# Patient Record
Sex: MALE | Race: ASIAN | NOT HISPANIC OR LATINO | Employment: FULL TIME | URBAN - METROPOLITAN AREA
[De-identification: names, ages, dates, MRNs, and addresses within clinical notes are randomized per-mention and may not be internally consistent; named-entity substitution may affect disease eponyms.]

---

## 2017-02-23 ENCOUNTER — MYC REFILL (OUTPATIENT)
Dept: GASTROENTEROLOGY | Facility: CLINIC | Age: 37
End: 2017-02-23

## 2017-02-23 DIAGNOSIS — K75.81 NASH (NONALCOHOLIC STEATOHEPATITIS): ICD-10-CM

## 2017-02-23 DIAGNOSIS — B18.1 VIRAL HEPATITIS B CHRONIC (H): ICD-10-CM

## 2017-02-24 RX ORDER — TENOFOVIR DISOPROXIL FUMARATE 300 MG/1
300 TABLET, FILM COATED ORAL DAILY
Qty: 30 TABLET | Refills: 0 | Status: SHIPPED | OUTPATIENT
Start: 2017-02-24 | End: 2017-02-24

## 2017-02-24 RX ORDER — TENOFOVIR DISOPROXIL FUMARATE 300 MG/1
300 TABLET, FILM COATED ORAL DAILY
Qty: 30 TABLET | Refills: 1 | Status: SHIPPED | OUTPATIENT
Start: 2017-02-24 | End: 2017-09-27

## 2017-02-24 NOTE — TELEPHONE ENCOUNTER
Message from [facility]:  Lillie Hudson PA-C Fri Feb 24, 2017 1:22 PM    It is ok to refill with 1 refill. He will have to schedule an appointment before he will get any more refills after this.  Thanks,  Lillie   ----- Message -----   From: Elle Mcmahon LPN   Sent: 2/24/2017 8:52 AM   To: Lillie Hudson PA-C  Subject: FW: Medication Renewal Request     Lillie,     Patient is requesting a refill of Viread. He has not seen you since 05/2016. He no showed the last appointment scheduled with you. He has no upcoming appointments scheduled. Would you like to refill once with no refills? or would you like to refuse Rx until patient see's you? Let me know, and I will f/u with the patient.     Thank you!     Elle Mcmahon LPN  [facility]    ----- Message -----   From: Ankita Bryson LPN   Sent: 2/24/2017 7:45 AM   To: Elle Mcmahon LPN  Subject: FW: Medication Renewal Request         ----- Message -----   From: Ricky Pruitt   Sent: 2/23/2017 7:30 PM   To: Hepatology Nurses-  Subject: Medication Renewal Request     Original authorizing provider: MIMA Bolden would like a refill of the following medications:  tenofovir (VIREAD) 300 MG tablet [Lillie Hudson PA-C]    Preferred pharmacy: Other - [facility] ([email or web address]    Comment:  This message is being sent by Luly Pruitt on behalf of Ricky Pruitt    Please renew my prescription. Going forward I will be using [facility] and not [facility] in [place], [place] anymore. The [facility] representative said you can escribe the prescription to [facility] or fax it to [phone].

## 2017-06-15 DIAGNOSIS — B18.1 VIRAL HEPATITIS B CHRONIC (H): Primary | ICD-10-CM

## 2017-06-15 DIAGNOSIS — K76.0 NONALCOHOLIC HEPATOSTEATOSIS: ICD-10-CM

## 2017-06-19 DIAGNOSIS — K76.0 NONALCOHOLIC HEPATOSTEATOSIS: ICD-10-CM

## 2017-06-19 DIAGNOSIS — B18.1 VIRAL HEPATITIS B CHRONIC (H): ICD-10-CM

## 2017-06-19 LAB
ALBUMIN SERPL-MCNC: 3.4 G/DL (ref 3.4–5)
ALP SERPL-CCNC: 107 U/L (ref 40–150)
ALT SERPL W P-5'-P-CCNC: 113 U/L (ref 0–70)
ANION GAP SERPL CALCULATED.3IONS-SCNC: 5 MMOL/L (ref 3–14)
AST SERPL W P-5'-P-CCNC: 80 U/L (ref 0–45)
BILIRUB DIRECT SERPL-MCNC: 0.2 MG/DL (ref 0–0.2)
BILIRUB SERPL-MCNC: 1.5 MG/DL (ref 0.2–1.3)
BUN SERPL-MCNC: 13 MG/DL (ref 7–30)
CALCIUM SERPL-MCNC: 9.1 MG/DL (ref 8.5–10.1)
CHLORIDE SERPL-SCNC: 103 MMOL/L (ref 94–109)
CO2 SERPL-SCNC: 29 MMOL/L (ref 20–32)
CREAT SERPL-MCNC: 0.86 MG/DL (ref 0.66–1.25)
ERYTHROCYTE [DISTWIDTH] IN BLOOD BY AUTOMATED COUNT: 12.9 % (ref 10–15)
GFR SERPL CREATININE-BSD FRML MDRD: ABNORMAL ML/MIN/1.7M2
GLUCOSE SERPL-MCNC: 145 MG/DL (ref 70–99)
HCT VFR BLD AUTO: 44.8 % (ref 40–53)
HGB BLD-MCNC: 15.5 G/DL (ref 13.3–17.7)
INR PPP: 1.03 (ref 0.86–1.14)
MCH RBC QN AUTO: 30.6 PG (ref 26.5–33)
MCHC RBC AUTO-ENTMCNC: 34.6 G/DL (ref 31.5–36.5)
MCV RBC AUTO: 88 FL (ref 78–100)
PLATELET # BLD AUTO: 201 10E9/L (ref 150–450)
POTASSIUM SERPL-SCNC: 4 MMOL/L (ref 3.4–5.3)
PROT SERPL-MCNC: 8 G/DL (ref 6.8–8.8)
RBC # BLD AUTO: 5.07 10E12/L (ref 4.4–5.9)
SODIUM SERPL-SCNC: 137 MMOL/L (ref 133–144)
WBC # BLD AUTO: 7.5 10E9/L (ref 4–11)

## 2017-06-19 PROCEDURE — 36415 COLL VENOUS BLD VENIPUNCTURE: CPT | Performed by: INTERNAL MEDICINE

## 2017-06-19 PROCEDURE — 85610 PROTHROMBIN TIME: CPT | Performed by: INTERNAL MEDICINE

## 2017-06-19 PROCEDURE — 80048 BASIC METABOLIC PNL TOTAL CA: CPT | Performed by: INTERNAL MEDICINE

## 2017-06-19 PROCEDURE — 85027 COMPLETE CBC AUTOMATED: CPT | Performed by: INTERNAL MEDICINE

## 2017-06-19 PROCEDURE — 87517 HEPATITIS B DNA QUANT: CPT | Performed by: INTERNAL MEDICINE

## 2017-06-19 PROCEDURE — 80076 HEPATIC FUNCTION PANEL: CPT | Performed by: INTERNAL MEDICINE

## 2017-06-20 LAB
HBV DNA SERPL NAA+PROBE-ACNC: ABNORMAL [IU]/ML
HBV DNA SERPL NAA+PROBE-LOG IU: >8.2 {LOG_IU}/ML

## 2017-06-21 ENCOUNTER — OFFICE VISIT (OUTPATIENT)
Dept: GASTROENTEROLOGY | Facility: CLINIC | Age: 37
End: 2017-06-21
Attending: INTERNAL MEDICINE
Payer: COMMERCIAL

## 2017-06-21 VITALS
DIASTOLIC BLOOD PRESSURE: 80 MMHG | BODY MASS INDEX: 35.1 KG/M2 | TEMPERATURE: 98.2 F | HEART RATE: 89 BPM | WEIGHT: 237 LBS | HEIGHT: 69 IN | SYSTOLIC BLOOD PRESSURE: 113 MMHG | OXYGEN SATURATION: 96 %

## 2017-06-21 DIAGNOSIS — B18.1 VIRAL HEPATITIS B CHRONIC (H): Primary | ICD-10-CM

## 2017-06-21 PROCEDURE — 99212 OFFICE O/P EST SF 10 MIN: CPT | Mod: ZF

## 2017-06-21 RX ORDER — TENOFOVIR DISOPROXIL FUMARATE 300 MG/1
300 TABLET, FILM COATED ORAL DAILY
Qty: 90 TABLET | Refills: 3 | Status: SHIPPED | OUTPATIENT
Start: 2017-06-21 | End: 2017-12-18

## 2017-06-21 ASSESSMENT — PAIN SCALES - GENERAL: PAINLEVEL: NO PAIN (0)

## 2017-06-21 NOTE — NURSING NOTE
Chief Complaint   Patient presents with     RECHECK     Hepatitis B   Pt roomed, vitals, meds, and allergies reviewed with pt. Pt ready for provider.  Albaro Kwok, CMA

## 2017-06-21 NOTE — PROGRESS NOTES
CHIEF COMPLAINT:  Reason for the visit is hepatitis B virus infection.      HISTORY OF PRESENT ILLNESS:  Mr. Pruitt is a 36-year-old [place] immigrant who carries a diagnosis of hepatitis B virus infection.  According to him, he was diagnosed or at least told that he has it around 9 years ago.  He was, at that time, going to increase his insurance but was found to have hepatitis B. Other members of his family have it, including his mom and his sister.  He did get started, in 2010, with a medication but he discontinued it on his own and then he was restarted here when he saw Lillie and he was on Viread but for the last 1 month he has not taken it and he ran out of it and probably did not understand the significance of noncompliance with medications.  Regardless, he does not have any signs of chronic liver disease.  No fluid retention, including edema.  His weight has remained stable and maybe even gone up a little bit.  His appetite is good.  No nausea, vomiting or abdominal pain.  He is not jaundiced.  He is not having any pruritus.      Current Outpatient Prescriptions   Medication     tenofovir (VIREAD) 300 MG tablet     tenofovir (VIREAD) 300 MG tablet     No current facility-administered medications for this visit.        PHYSICAL EXAMINATION:   VITAL SIGNS:  As of today, 06/21/2017, blood pressure is 113/80, temperature is 98.2, pulse is 89.  Height is 1.753 meters, weight is 107.5 kg, BMI is 35.05.   HEENT:  Eyes are not icteric.  Mucosa moist.   NECK:  Supple, no lymphadenopathy.   CHEST:  Clear to auscultation.   ABDOMEN:  Obese, bowel sounds are present.  No hepatomegaly and no splenomegaly.   EXTREMITIES:  No edema, no clubbing.   CENTRAL NERVOUS SYSTEM:  Alert and oriented to place, person and time.  No asterixis noted.      IMPRESSION AND PLAN:  From his recent labs, his viral load is extremely high.  He has abnormal liver function tests and he is E antigen positive, so this warrants him to be on  medication, which we are going to put him on and this is Viread.  He will take it daily.  We will give it for 3 months and then we will give him 4 refills.  We talked to him about the importance of compliance.  He understands the significance and he understands that the progression of liver disease will lead to liver loss.  We advised him also to lose weight as he is obese and he will do both of them.  He is motivated now with understanding the significance of not taking the medication.  We will see him in 3 months and we will see how he responds to the Viread at that time and then we will put him on a 6 month followup.      This was a 25-minute visit of which more than 50% was spent in explaining to the patient what our plan of care was.  We answered all his questions.      cc:  Primary care physician

## 2017-06-21 NOTE — MR AVS SNAPSHOT
After Visit Summary   6/21/2017    Ricky Pruitt    MRN: [id number]           Patient Information     Date Of Birth          1980        Visit Information        Provider Department      6/21/2017 9:10 AM Marium Terry MD [facility] Hepatology        Today's Diagnoses     Viral hepatitis B chronic (H)    -  1       Follow-ups after your visit        Follow-up notes from your care team     Return in about 3 months (around 9/21/2017).      Your next 10 appointments already scheduled     Aug 12, 2017  9:05 AM CDT   LAB with [facility] ([facility])    [address] [address]   [phone]           Patient must bring picture ID.  Patient should be prepared to give a urine specimen  Please do not eat 10-12 hours before your appointment if you are coming in fasting for labs on lipids, cholesterol, or glucose (sugar).  Pregnant women should follow their Care Team instructions. Water with medications is okay. Do not drink coffee or other fluids.   If you have concerns about taking  your medications, please ask at office or if scheduling via [employer], send a message by clicking on Secure Messaging, Message Your Care Team.            Sep 23, 2017  9:00 AM CDT   LAB with [facility] [facility]   [facility]    [address] [address]   [phone]           Patient must bring picture ID.  Patient should be prepared to give a urine specimen  Please do not eat 10-12 hours before your appointment if you are coming in fasting for labs on lipids, cholesterol, or glucose (sugar).  Pregnant women should follow their Care Team instructions. Water with medications is okay. Do not drink coffee or other fluids.   If you have concerns about taking  your medications, please ask at office or if scheduling via [employer], send a message by clicking on Secure Messaging, Message Your Care Team.            Sep  27, 2017  9:50 AM CDT   (Arrive by 9:35 AM)   Return General Liver with Marium Terry MD   [facility] Hepatology ([email or web address])    [address] [facility] [address] [phone]              Future tests that were ordered for you today     Open Standing Orders        Priority Remaining Interval Expires Ordered    Hepatic panel Routine 1/1 AM DRAW  6/21/2017          Open Future Orders        Priority Expected Expires Ordered    CBC with platelets Routine  7/21/2017 6/21/2017    Hep B Virus DNA Quant Real Time PCR Routine  7/21/2017 6/21/2017            Who to contact     If you have questions or need follow up information about today's clinic visit or your schedule please contact [facility] HEPATOLOGY directly at [phone].  Normal or non-critical lab and imaging results will be communicated to you by [employer], letter or phone within 4 business days after the clinic has received the results. If you do not hear from us within 7 days, please contact the clinic through [employer] or phone. If you have a critical or abnormal lab result, we will notify you by phone as soon as possible.  Submit refill requests through [email or web address] or call your pharmacy and they will forward the refill request to us. Please allow 3 business days for your refill to be completed.          Additional Information About Your Visit        [email or web address] Information     [email or web address] gives you secure access to your electronic health record. If you see a primary care provider, you can also send messages to your care team and make appointments. If you have questions, please call your primary care clinic.  If you do not have a primary care provider, please call [phone] and they will assist you.        Care EveryWhere ID     This is your Care EveryWhere ID. This could be used by other organizations to access your [facility] medical records  [id number]        Your Vitals Were     Pulse Temperature Height  "Pulse Oximetry BMI (Body Mass Index)       89 98.2  F (36.8  C) (Oral) 1.753 m (5' 9.02\") 96% 34.98 kg/m2        Blood Pressure from Last 3 Encounters:   06/21/17 113/80   05/12/16 119/78   04/19/16 110/82    Weight from Last 3 Encounters:   06/21/17 107.5 kg (237 lb)   05/12/16 106.1 kg (233 lb 14.4 oz)   04/19/16 104.3 kg (230 lb)                 Today's Medication Changes          These changes are accurate as of: 6/21/17  9:45 AM.  If you have any questions, ask your nurse or doctor.               These medicines have changed or have updated prescriptions.        Dose/Directions    * tenofovir 300 MG tablet   Commonly known as:  VIREAD   This may have changed:  Another medication with the same name was added. Make sure you understand how and when to take each.   Used for:  Viral hepatitis B chronic (H)   Changed by:  Lillie Hudson PA-C        Dose:  300 mg   Take 1 tablet (300 mg) by mouth daily   Quantity:  30 tablet   Refills:  1       * tenofovir 300 MG tablet   Commonly known as:  VIREAD   This may have changed:  You were already taking a medication with the same name, and this prescription was added. Make sure you understand how and when to take each.   Used for:  Viral hepatitis B chronic (H)   Changed by:  Marium Terry MD        Dose:  300 mg   Take 1 tablet (300 mg) by mouth daily   Quantity:  90 tablet   Refills:  3       * Notice:  This list has 2 medication(s) that are the same as other medications prescribed for you. Read the directions carefully, and ask your doctor or other care provider to review them with you.         Where to get your medicines      These medications [facility] [address]  [address] [address]    Hours:  [place] [employer] [employer] Phone:  [phone]     tenofovir 300 MG tablet                Primary Care Provider Office Phone # Fax #    R Sin Knight [phone] " [phone]       [facility] [address] [address] [address]        Equal Access to Services     COURTNEY JORGENSEN : Hadii aad ku hadcliftonjessica Gayathri, wamatheusda luqmian, qatawanata kaalmada phu, miles mathew laRosariolola clifford. So [facility] [phone].    ATENCIÓN: Si habla español, tiene a storm disposición servicios gratuitos de asistencia lingüística. Llame al [phone].    We comply with applicable federal civil rights laws and [place] laws. We do not discriminate on the basis of race, color, national origin, age, disability sex, sexual orientation or gender identity.            Thank you!     Thank you for choosing [facility] HEPATOLOGY  for your care. Our goal is always to provide you with excellent care. Hearing back from our patients is one way we can continue to improve our services. Please take a few minutes to complete the written survey that you may receive in the mail after your visit with us. Thank you!             Your Updated Medication List - Protect others around you: Learn how to safely use, store and throw away your medicines at www.disposemymeds.org.          This list is accurate as of: 6/21/17  9:45 AM.  Always use your most recent med list.                   Brand Name Dispense Instructions for use Diagnosis    * tenofovir 300 MG tablet    VIREAD    30 tablet    Take 1 tablet (300 mg) by mouth daily    Viral hepatitis B chronic (H)       * tenofovir 300 MG tablet    VIREAD    90 tablet    Take 1 tablet (300 mg) by mouth daily    Viral hepatitis B chronic (H)       * Notice:  This list has 2 medication(s) that are the same as other medications prescribed for you. Read the directions carefully, and ask your doctor or other care provider to review them with you.

## 2017-06-21 NOTE — LETTER
6/21/2017      RE: Ricky Pruitt  [address] [address]       CHIEF COMPLAINT:  Reason for the visit is hepatitis B virus infection.      HISTORY OF PRESENT ILLNESS:  Mr. Pruitt is a 36-year-old [place] immigrant who carries a diagnosis of hepatitis B virus infection.  According to him, he was diagnosed or at least told that he has it around 9 years ago.  He was, at that time, going to increase his insurance but was found to have hepatitis B. Other members of his family have it, including his mom and his sister.  He did get started, in 2010, with a medication but he discontinued it on his own and then he was restarted here when he saw Lillie and he was on Viread but for the last 1 month he has not taken it and he ran out of it and probably did not understand the significance of noncompliance with medications.  Regardless, he does not have any signs of chronic liver disease.  No fluid retention, including edema.  His weight has remained stable and maybe even gone up a little bit.  His appetite is good.  No nausea, vomiting or abdominal pain.  He is not jaundiced.  He is not having any pruritus.      Current Outpatient Prescriptions   Medication     tenofovir (VIREAD) 300 MG tablet     tenofovir (VIREAD) 300 MG tablet     No current facility-administered medications for this visit.        PHYSICAL EXAMINATION:   VITAL SIGNS:  As of today, 06/21/2017, blood pressure is 113/80, temperature is 98.2, pulse is 89.  Height is 1.753 meters, weight is 107.5 kg, BMI is 35.05.   HEENT:  Eyes are not icteric.  Mucosa moist.   NECK:  Supple, no lymphadenopathy.   CHEST:  Clear to auscultation.   ABDOMEN:  Obese, bowel sounds are present.  No hepatomegaly and no splenomegaly.   EXTREMITIES:  No edema, no clubbing.   CENTRAL NERVOUS SYSTEM:  Alert and oriented to place, person and time.  No asterixis noted.      IMPRESSION AND PLAN:  From his recent labs, his viral load is extremely high.  He has abnormal liver  function tests and he is E antigen positive, so this warrants him to be on medication, which we are going to put him on and this is Viread.  He will take it daily.  We will give it for 3 months and then we will give him 4 refills.  We talked to him about the importance of compliance.  He understands the significance and he understands that the progression of liver disease will lead to liver loss.  We advised him also to lose weight as he is obese and he will do both of them.  He is motivated now with understanding the significance of not taking the medication.  We will see him in 3 months and we will see how he responds to the Viread at that time and then we will put him on a 6 month followup.      This was a 25-minute visit of which more than 50% was spent in explaining to the patient what our plan of care was.  We answered all his questions.      cc:  Primary care physician           Marium Terry MD

## 2017-06-21 NOTE — LETTER
6/21/2017      RE: Ricky Pruitt  [address] [address]       CHIEF COMPLAINT:  Reason for the visit is hepatitis B virus infection.      HISTORY OF PRESENT ILLNESS:  Mr. Pruitt is a 36-year-old [place] immigrant who carries a diagnosis of hepatitis B virus infection.  According to him, he was diagnosed or at least told that he has it around 9 years ago.  He was, at that time, going to increase his insurance but was found to have hepatitis B. Other members of his family have it, including his mom and his sister.  He did get started, in 2010, with a medication but he discontinued it on his own and then he was restarted here when he saw Lillie and he was on Viread but for the last 1 month he has not taken it and he ran out of it and probably did not understand the significance of noncompliance with medications.  Regardless, he does not have any signs of chronic liver disease.  No fluid retention, including edema.  His weight has remained stable and maybe even gone up a little bit.  His appetite is good.  No nausea, vomiting or abdominal pain.  He is not jaundiced.  He is not having any pruritus.      Current Outpatient Prescriptions   Medication     tenofovir (VIREAD) 300 MG tablet     tenofovir (VIREAD) 300 MG tablet     No current facility-administered medications for this visit.        PHYSICAL EXAMINATION:   VITAL SIGNS:  As of today, 06/21/2017, blood pressure is 113/80, temperature is 98.2, pulse is 89.  Height is 1.753 meters, weight is 107.5 kg, BMI is 35.05.   HEENT:  Eyes are not icteric.  Mucosa moist.   NECK:  Supple, no lymphadenopathy.   CHEST:  Clear to auscultation.   ABDOMEN:  Obese, bowel sounds are present.  No hepatomegaly and no splenomegaly.   EXTREMITIES:  No edema, no clubbing.   CENTRAL NERVOUS SYSTEM:  Alert and oriented to place, person and time.  No asterixis noted.      IMPRESSION AND PLAN:  From his recent labs, his viral load is extremely high.  He has abnormal liver  function tests and he is E antigen positive, so this warrants him to be on medication, which we are going to put him on and this is Viread.  He will take it daily.  We will give it for 3 months and then we will give him 4 refills.  We talked to him about the importance of compliance.  He understands the significance and he understands that the progression of liver disease will lead to liver loss.  We advised him also to lose weight as he is obese and he will do both of them.  He is motivated now with understanding the significance of not taking the medication.  We will see him in 3 months and we will see how he responds to the Viread at that time and then we will put him on a 6 month followup.      This was a 25-minute visit of which more than 50% was spent in explaining to the patient what our plan of care was.  We answered all his questions.      cc:  Primary care physician           Marium Terry MD

## 2017-08-12 DIAGNOSIS — B18.1 VIRAL HEPATITIS B CHRONIC (H): ICD-10-CM

## 2017-08-12 LAB
ALBUMIN SERPL-MCNC: 3.5 G/DL (ref 3.4–5)
ALP SERPL-CCNC: 150 U/L (ref 40–150)
ALT SERPL W P-5'-P-CCNC: 177 U/L (ref 0–70)
AST SERPL W P-5'-P-CCNC: 107 U/L (ref 0–45)
BILIRUB DIRECT SERPL-MCNC: 0.1 MG/DL (ref 0–0.2)
BILIRUB SERPL-MCNC: 0.8 MG/DL (ref 0.2–1.3)
ERYTHROCYTE [DISTWIDTH] IN BLOOD BY AUTOMATED COUNT: 12.7 % (ref 10–15)
HCT VFR BLD AUTO: 46.3 % (ref 40–53)
HGB BLD-MCNC: 15.7 G/DL (ref 13.3–17.7)
MCH RBC QN AUTO: 29.4 PG (ref 26.5–33)
MCHC RBC AUTO-ENTMCNC: 33.9 G/DL (ref 31.5–36.5)
MCV RBC AUTO: 87 FL (ref 78–100)
PLATELET # BLD AUTO: 197 10E9/L (ref 150–450)
PROT SERPL-MCNC: 8.3 G/DL (ref 6.8–8.8)
RBC # BLD AUTO: 5.34 10E12/L (ref 4.4–5.9)
WBC # BLD AUTO: 8.9 10E9/L (ref 4–11)

## 2017-08-12 PROCEDURE — 36415 COLL VENOUS BLD VENIPUNCTURE: CPT | Performed by: INTERNAL MEDICINE

## 2017-08-12 PROCEDURE — 80076 HEPATIC FUNCTION PANEL: CPT | Performed by: INTERNAL MEDICINE

## 2017-08-12 PROCEDURE — 85027 COMPLETE CBC AUTOMATED: CPT | Performed by: INTERNAL MEDICINE

## 2017-08-12 PROCEDURE — 87517 HEPATITIS B DNA QUANT: CPT | Performed by: INTERNAL MEDICINE

## 2017-08-16 LAB
HBV DNA SERPL NAA+PROBE-ACNC: ABNORMAL [IU]/ML
HBV DNA SERPL NAA+PROBE-LOG IU: 4.4 {LOG_IU}/ML

## 2017-09-19 ENCOUNTER — PRE VISIT (OUTPATIENT)
Dept: GASTROENTEROLOGY | Facility: CLINIC | Age: 37
End: 2017-09-19

## 2017-09-19 NOTE — TELEPHONE ENCOUNTER
Was the patient contacted by phone and reminded of the upcoming visit? message left    Was the patient instructed to bring a current list of all medications to the appointment or instructed to bring in all medication bottles? Yes     Were ordered labs and tests completed prior to the appointment? Yes    Message left for pt that no additional labs are needed as they were all drawn last month.    Patient instructed to arrive early for check-in    Ifrah Lan CMA  9/19/2017 3:52 PM

## 2017-09-22 ENCOUNTER — TELEPHONE (OUTPATIENT)
Dept: GASTROENTEROLOGY | Facility: CLINIC | Age: 37
End: 2017-09-22

## 2017-09-22 DIAGNOSIS — B18.1 VIRAL HEPATITIS B CHRONIC (H): Primary | ICD-10-CM

## 2017-09-22 NOTE — TELEPHONE ENCOUNTER
Writer left message stating labs were ordered. Last office visit from June 2017 indicated that the patient needed a 3 month lab draw which would have been September 21, 2017. This was done in August 2017 instead.     Sent to Dr. Terry to clarify. Ordered labs to follow what pt states was discussed with Dr. Terry to have labs watched more closely.

## 2017-09-22 NOTE — TELEPHONE ENCOUNTER
Lab called, spoke with Hayley. Patient scheduled to have a lab draw on Tuesday, but does not have any orders. Appears to need a Hep B recheck. Requesting to have labs ordered.    Amy Arita RN

## 2017-09-23 DIAGNOSIS — B18.1 VIRAL HEPATITIS B CHRONIC (H): ICD-10-CM

## 2017-09-23 LAB
ALBUMIN SERPL-MCNC: 3.4 G/DL (ref 3.4–5)
ALP SERPL-CCNC: 188 U/L (ref 40–150)
ALT SERPL W P-5'-P-CCNC: 205 U/L (ref 0–70)
AST SERPL W P-5'-P-CCNC: 136 U/L (ref 0–45)
BILIRUB DIRECT SERPL-MCNC: 0.1 MG/DL (ref 0–0.2)
BILIRUB SERPL-MCNC: 0.8 MG/DL (ref 0.2–1.3)
PROT SERPL-MCNC: 8.4 G/DL (ref 6.8–8.8)

## 2017-09-23 PROCEDURE — 80076 HEPATIC FUNCTION PANEL: CPT | Performed by: INTERNAL MEDICINE

## 2017-09-23 PROCEDURE — 87517 HEPATITIS B DNA QUANT: CPT | Performed by: INTERNAL MEDICINE

## 2017-09-23 PROCEDURE — 36415 COLL VENOUS BLD VENIPUNCTURE: CPT | Performed by: INTERNAL MEDICINE

## 2017-09-27 ENCOUNTER — OFFICE VISIT (OUTPATIENT)
Dept: GASTROENTEROLOGY | Facility: CLINIC | Age: 37
End: 2017-09-27
Attending: INTERNAL MEDICINE
Payer: COMMERCIAL

## 2017-09-27 VITALS
DIASTOLIC BLOOD PRESSURE: 87 MMHG | HEIGHT: 69 IN | TEMPERATURE: 97.3 F | BODY MASS INDEX: 35.31 KG/M2 | WEIGHT: 238.4 LBS | SYSTOLIC BLOOD PRESSURE: 128 MMHG | HEART RATE: 82 BPM

## 2017-09-27 DIAGNOSIS — B18.1 VIRAL HEPATITIS B CHRONIC (H): Primary | ICD-10-CM

## 2017-09-27 LAB
HBV DNA SERPL NAA+PROBE-ACNC: 1958 [IU]/ML
HBV DNA SERPL NAA+PROBE-LOG IU: 3.3 {LOG_IU}/ML

## 2017-09-27 PROCEDURE — G0008 ADMIN INFLUENZA VIRUS VAC: HCPCS | Mod: ZF

## 2017-09-27 PROCEDURE — 90686 IIV4 VACC NO PRSV 0.5 ML IM: CPT | Mod: ZF | Performed by: INTERNAL MEDICINE

## 2017-09-27 PROCEDURE — 99212 OFFICE O/P EST SF 10 MIN: CPT | Mod: 25,ZF

## 2017-09-27 PROCEDURE — 25000128 H RX IP 250 OP 636: Mod: ZF | Performed by: INTERNAL MEDICINE

## 2017-09-27 RX ADMIN — INFLUENZA A VIRUS A/MICHIGAN/45/2015 X-275 (H1N1) ANTIGEN (FORMALDEHYDE INACTIVATED), INFLUENZA A VIRUS A/HONG KONG/4801/2014 X-263B (H3N2) ANTIGEN (FORMALDEHYDE INACTIVATED), INFLUENZA B VIRUS B/PHUKET/3073/2013 ANTIGEN (FORMALDEHYDE INACTIVATED), AND INFLUENZA B VIRUS B/BRISBANE/60/2008 ANTIGEN (FORMALDEHYDE INACTIVATED) 0.5 ML: 15; 15; 15; 15 INJECTION, SUSPENSION INTRAMUSCULAR at 10:49

## 2017-09-27 ASSESSMENT — PAIN SCALES - GENERAL: PAINLEVEL: NO PAIN (0)

## 2017-09-27 NOTE — LETTER
9/27/2017      RE: Ricky Pruitt  [address]  [place] [address]       [facility]    Hepatology follow-up    Follow-up visit for hepatitis B virus infection.    Subjective:  Mr. Pruitt is a 37-year-old [place] immigrant who we have seen here for hepatitis B related infection.  He did get the diagnosis when he was 10 years old and then when he tried to adjust his life insurance, he was told that he has hepatitis B.  He has also other members of his family, including his mother and sister, who had hepatitis B.  Mr. Pruitt, in 2010, was started on antiviral medication but discontinued it on his own and then he came back here and we started him on Viread and he took it only for a month and he ran out of it but did not renew it.  We then sent him a prescription and now he is following with that.      Mr. Pruitt is absolutely not symptomatic.  Denies nausea, vomiting or abdominal pain.  He is moving his bowels regularly.  He does not have any other issues except nosebleeds, which he has off and on and he attributes that to drying in his nostrils. Patient denies jaundice, lower extremity edema, abdominal distension, lethargy or confusion. Denies melena, hematemesis or hematochezia. No fevers, sweats or chills.  Weight stable.      Medical hx Surgical hx   Past Medical History:   Diagnosis Date     Diagnostic skin and sensitization tests (aka ALLERGENS) 5/13/15 skin tests pos. for:  cat/dog/DM/M/T/G/W     Hepatitis B      Seasonal allergic rhinitis       Past Surgical History:   Procedure Laterality Date     APPENDECTOMY  2009          Medications  Prior to Admission medications    Medication Sig Start Date End Date Taking? Authorizing Provider   tenofovir (VIREAD) 300 MG tablet Take 1 tablet (300 mg) by mouth daily 6/21/17  Yes Marium Terry MD       Allergies  No Known Allergies    Review of systems  A 10-point review of systems was negative    Examination  /87  Pulse  "82  Temp 97.3  F (36.3  C) (Oral)  Ht 1.753 m (5' 9\")  Wt 108.1 kg (238 lb 6.4 oz)  BMI 35.21 kg/m2  Body mass index is 35.21 kg/(m^2).    Gen- well, NAD, A+Ox3, normal color  Lym- no palpable LAD  CVS- RRR  RS- CTA  Abd- mildly obese, no hepatosplenomegaly.  Extr- hands normal, no LUIS EDUARDO  Skin- no rash or jaundice  Neuro- no asterixis  Psych- normal mood    Laboratory  Lab Results   Component Value Date     06/19/2017    POTASSIUM 4.0 06/19/2017    CHLORIDE 103 06/19/2017    CO2 29 06/19/2017    BUN 13 06/19/2017    CR 0.86 06/19/2017       Lab Results   Component Value Date    BILITOTAL 0.8 09/23/2017     09/23/2017     09/23/2017    ALKPHOS 188 09/23/2017       Lab Results   Component Value Date    ALBUMIN 3.4 09/23/2017    PROTTOTAL 8.4 09/23/2017        Lab Results   Component Value Date    WBC 8.9 08/12/2017    HGB 15.7 08/12/2017    MCV 87 08/12/2017     08/12/2017       Lab Results   Component Value Date    INR 1.03 06/19/2017       Radiology    Assessment and plan:  Mr. Pruitt has hepatitis B virus infection, active disease.  He is on tenofovir and we will continue that.  We advised against stopping on his own.  We told him when that could be discontinued safely and he understands that.  He is just 37 so below the 40 needed for  men to be screened for HCC.  We advised also against any hepatotoxic substance and he is comfortable with that.  He will need also to do some lifestyle modifications, including weight losing strategies.      This was a 25-minute visit of which more than 50% was spent in explaining to the patient what our plan of care was.  We answered all his questions.      cc:  Primary care physician     Marium Terry MD  Hepatology  [facility]      "

## 2017-09-27 NOTE — NURSING NOTE
Ricky Pruitt      1.  Has the patient received the information for the influenza vaccine? YES    2.  Does the patient have any of the following contraindications?     Allergy to eggs? No     Allergic reaction to previous influenza vaccines? No     Any other problems to previous influenza vaccines? No     Paralyzed by Guillain-Girard syndrome? No     Currently pregnant? NO     Current moderate or severe illness? No     Allergy to contact lens solution? No    3.  The vaccine has been administered in the usual fashion and the patient was instructed to wait 20 minutes before leaving the building in the event of an allergic reaction: YES    Vaccination given by Ifrah Lan [facility]  9/27/2017 10:48 AM    .  Recorded by Irfah Lan

## 2017-09-27 NOTE — MR AVS SNAPSHOT
After Visit Summary   9/27/2017    Ricky Pruitt    MRN: [id number]           Patient Information     Date Of Birth          1980        Visit Information        Provider Department      9/27/2017 9:50 AM Marium Terry MD [facility] Hepatology        Today's Diagnoses     Viral hepatitis B chronic (H)    -  1       Follow-ups after your visit        Follow-up notes from your care team     Return in about 3 months (around 12/27/2017).      Your next 10 appointments already scheduled     Jan 23, 2018 10:00 AM CST   (Arrive by 9:45 AM)   Return General Liver with Marium Terry MD   [facility] Hepatology ([facility])    [address] [place] [address] [phone]            Apr 03, 2018 10:00 AM CDT   (Arrive by 9:45 AM)   Return General Liver with Marium Terry MD   [facility] Hepatology ([facility])    [address] [place] [address] [phone]              Future tests that were ordered for you today     Open Standing Orders        Priority Remaining Interval Expires Ordered    Lipid panel reflex to direct LDL Routine 1/1 AM DRAW  9/27/2017    Hepatic panel Routine 1/1 AM DRAW  9/27/2017          Open Future Orders        Priority Expected Expires Ordered    Hep B Virus DNA Quant Real Time PCR Routine 11/4/2017 1/31/2018 9/27/2017            Who to contact     If you have questions or need follow up information about today's clinic visit or your schedule please contact [facility] HEPATOLOGY directly at [phone].  Normal or non-critical lab and imaging results will be communicated to you by MyChart, letter or phone within 4 business days after the clinic has received the results. If you do not hear from us within 7 days, please contact the clinic through MyChart or phone. If you have a critical or abnormal lab result, we will notify you by phone as  "soon as possible.  Submit refill requests through [employer] or call your pharmacy and they will forward the refill request to us. Please allow 3 business days for your refill to be completed.          Additional Information About Your Visit        [employer] Information     [employer] gives you secure access to your electronic health record. If you see a primary care provider, you can also send messages to your care team and make appointments. If you have questions, please call your primary care clinic.  If you do not have a primary care provider, please call [phone] and they will assist you.        Care EveryWhere ID     This is your Care EveryWhere ID. This could be used by other organizations to access your [place] medical records  [id number]        Your Vitals Were     Pulse Temperature Height BMI (Body Mass Index)          82 97.3  F (36.3  C) (Oral) 1.753 m (5' 9\") 35.21 kg/m2         Blood Pressure from Last 3 Encounters:   09/27/17 128/87   06/21/17 113/80   05/12/16 119/78    Weight from Last 3 Encounters:   09/27/17 108.1 kg (238 lb 6.4 oz)   06/21/17 107.5 kg (237 lb)   05/12/16 106.1 kg (233 lb 14.4 oz)               Primary Care Provider Office Phone # Fax #    R Sin Knight [phone] [address] [facility] [address]        Equal Access to Services     [facility] AH: Hadii aad ku hadasho Soomaali, waaxda luqadaha, qaybta kaalmada adekassiyada, miles brandt . So [facility] [phone].    ATENCIÓN: Si habla español, tiene a storm disposición servicios gratuitos de asistencia lingüística. Llame al [phone].    We comply with applicable federal civil rights laws and [place] laws. We do not discriminate on the basis of race, color, national origin, age, disability sex, sexual orientation or gender identity.            Thank you!     Thank you for choosing [facility] HEPATOLOGY  for your care. Our goal is always to provide you with excellent care. " Hearing back from our patients is one way we can continue to improve our services. Please take a few minutes to complete the written survey that you may receive in the mail after your visit with us. Thank you!             Your Updated Medication List - Protect others around you: Learn how to safely use, store and throw away your medicines at www.disposemymeds.org.          This list is accurate as of: 9/27/17 10:48 AM.  Always use your most recent med list.                   Brand Name Dispense Instructions for use Diagnosis    tenofovir 300 MG tablet    VIREAD    90 tablet    Take 1 tablet (300 mg) by mouth daily    Viral hepatitis B chronic (H)

## 2017-09-27 NOTE — NURSING NOTE
"Chief Complaint   Patient presents with     RECHECK     follow up Hepatitis B       Initial /87  Pulse 82  Temp 97.3  F (36.3  C) (Oral)  Ht 1.753 m (5' 9\")  Wt 108.1 kg (238 lb 6.4 oz)  BMI 35.21 kg/m2 Estimated body mass index is 35.21 kg/(m^2) as calculated from the following:    Height as of this encounter: 1.753 m (5' 9\").    Weight as of this encounter: 108.1 kg (238 lb 6.4 oz).  Medication Reconciliation: complete  "

## 2017-09-28 NOTE — PROGRESS NOTES
"[facility]    Hepatology follow-up    Follow-up visit for hepatitis B virus infection.    Subjective:  Mr. Pruitt is a 37-year-old [place] immigrant who we have seen here for hepatitis B related infection.  He did get the diagnosis when he was 10 years old and then when he tried to adjust his life insurance, he was told that he has hepatitis B.  He has also other members of his family, including his mother and sister, who had hepatitis B.  Mr. Pruitt, in 2010, was started on antiviral medication but discontinued it on his own and then he came back here and we started him on Viread and he took it only for a month and he ran out of it but did not renew it.  We then sent him a prescription and now he is following with that.      Mr. Pruitt is absolutely not symptomatic.  Denies nausea, vomiting or abdominal pain.  He is moving his bowels regularly.  He does not have any other issues except nosebleeds, which he has off and on and he attributes that to drying in his nostrils. Patient denies jaundice, lower extremity edema, abdominal distension, lethargy or confusion. Denies melena, hematemesis or hematochezia. No fevers, sweats or chills.  Weight stable.      Medical hx Surgical hx   Past Medical History:   Diagnosis Date     Diagnostic skin and sensitization tests (aka ALLERGENS) 5/13/15 skin tests pos. for:  cat/dog/DM/M/T/G/W     Hepatitis B      Seasonal allergic rhinitis       Past Surgical History:   Procedure Laterality Date     APPENDECTOMY  2009          Medications  Prior to Admission medications    Medication Sig Start Date End Date Taking? Authorizing Provider   tenofovir (VIREAD) 300 MG tablet Take 1 tablet (300 mg) by mouth daily 6/21/17  Yes Marium Terry MD       Allergies  No Known Allergies    Review of systems  A 10-point review of systems was negative    Examination  /87  Pulse 82  Temp 97.3  F (36.3  C) (Oral)  Ht 1.753 m (5' 9\")  Wt 108.1 kg (238 lb 6.4 " oz)  BMI 35.21 kg/m2  Body mass index is 35.21 kg/(m^2).    Gen- well, NAD, A+Ox3, normal color  Lym- no palpable LAD  CVS- RRR  RS- CTA  Abd- mildly obese, no hepatosplenomegaly.  Extr- hands normal, no LUIS EDUARDO  Skin- no rash or jaundice  Neuro- no asterixis  Psych- normal mood    Laboratory  Lab Results   Component Value Date     06/19/2017    POTASSIUM 4.0 06/19/2017    CHLORIDE 103 06/19/2017    CO2 29 06/19/2017    BUN 13 06/19/2017    CR 0.86 06/19/2017       Lab Results   Component Value Date    BILITOTAL 0.8 09/23/2017     09/23/2017     09/23/2017    ALKPHOS 188 09/23/2017       Lab Results   Component Value Date    ALBUMIN 3.4 09/23/2017    PROTTOTAL 8.4 09/23/2017        Lab Results   Component Value Date    WBC 8.9 08/12/2017    HGB 15.7 08/12/2017    MCV 87 08/12/2017     08/12/2017       Lab Results   Component Value Date    INR 1.03 06/19/2017       Radiology    Assessment and plan:  Mr. Pruitt has hepatitis B virus infection, active disease.  He is on tenofovir and we will continue that.  We advised against stopping on his own.  We told him when that could be discontinued safely and he understands that.  He is just 37 so below the 40 needed for  men to be screened for HCC.  We advised also against any hepatotoxic substance and he is comfortable with that.  He will need also to do some lifestyle modifications, including weight losing strategies.      This was a 25-minute visit of which more than 50% was spent in explaining to the patient what our plan of care was.  We answered all his questions.      cc:  Primary care physician     Marium Terry MD  Hepatology  [facility]

## 2017-11-21 ENCOUNTER — OFFICE VISIT (OUTPATIENT)
Dept: FAMILY MEDICINE | Facility: CLINIC | Age: 37
End: 2017-11-21
Payer: COMMERCIAL

## 2017-11-21 VITALS
TEMPERATURE: 96.6 F | DIASTOLIC BLOOD PRESSURE: 76 MMHG | WEIGHT: 237 LBS | OXYGEN SATURATION: 94 % | BODY MASS INDEX: 35.1 KG/M2 | HEART RATE: 96 BPM | SYSTOLIC BLOOD PRESSURE: 129 MMHG | HEIGHT: 69 IN

## 2017-11-21 DIAGNOSIS — J02.9 ACUTE PHARYNGITIS, UNSPECIFIED ETIOLOGY: Primary | ICD-10-CM

## 2017-11-21 DIAGNOSIS — B18.1 VIRAL HEPATITIS B CHRONIC (H): ICD-10-CM

## 2017-11-21 LAB
ALBUMIN SERPL-MCNC: 3.6 G/DL (ref 3.4–5)
ALP SERPL-CCNC: 177 U/L (ref 40–150)
ALT SERPL W P-5'-P-CCNC: 189 U/L (ref 0–70)
AST SERPL W P-5'-P-CCNC: 105 U/L (ref 0–45)
BASOPHILS # BLD AUTO: 0.1 10E9/L (ref 0–0.2)
BASOPHILS NFR BLD AUTO: 0.5 %
BILIRUB DIRECT SERPL-MCNC: 0.1 MG/DL (ref 0–0.2)
BILIRUB SERPL-MCNC: 0.8 MG/DL (ref 0.2–1.3)
CHOLEST SERPL-MCNC: 156 MG/DL
DEPRECATED S PYO AG THROAT QL EIA: NORMAL
DIFFERENTIAL METHOD BLD: NORMAL
EOSINOPHIL # BLD AUTO: 0.4 10E9/L (ref 0–0.7)
EOSINOPHIL NFR BLD AUTO: 3.5 %
ERYTHROCYTE [DISTWIDTH] IN BLOOD BY AUTOMATED COUNT: 12.8 % (ref 10–15)
HCT VFR BLD AUTO: 46.8 % (ref 40–53)
HDLC SERPL-MCNC: 37 MG/DL
HETEROPH AB SER QL: NEGATIVE
HGB BLD-MCNC: 16.5 G/DL (ref 13.3–17.7)
LDLC SERPL CALC-MCNC: 88 MG/DL
LYMPHOCYTES # BLD AUTO: 3.9 10E9/L (ref 0.8–5.3)
LYMPHOCYTES NFR BLD AUTO: 37.5 %
MCH RBC QN AUTO: 30.3 PG (ref 26.5–33)
MCHC RBC AUTO-ENTMCNC: 35.3 G/DL (ref 31.5–36.5)
MCV RBC AUTO: 86 FL (ref 78–100)
MONOCYTES # BLD AUTO: 0.7 10E9/L (ref 0–1.3)
MONOCYTES NFR BLD AUTO: 6.9 %
NEUTROPHILS # BLD AUTO: 5.3 10E9/L (ref 1.6–8.3)
NEUTROPHILS NFR BLD AUTO: 51.6 %
NONHDLC SERPL-MCNC: 119 MG/DL
PLATELET # BLD AUTO: 214 10E9/L (ref 150–450)
PROT SERPL-MCNC: 9.1 G/DL (ref 6.8–8.8)
RBC # BLD AUTO: 5.44 10E12/L (ref 4.4–5.9)
SPECIMEN SOURCE: NORMAL
TRIGL SERPL-MCNC: 153 MG/DL
WBC # BLD AUTO: 10.3 10E9/L (ref 4–11)

## 2017-11-21 PROCEDURE — 85025 COMPLETE CBC W/AUTO DIFF WBC: CPT | Performed by: FAMILY MEDICINE

## 2017-11-21 PROCEDURE — 80061 LIPID PANEL: CPT | Performed by: INTERNAL MEDICINE

## 2017-11-21 PROCEDURE — 80076 HEPATIC FUNCTION PANEL: CPT | Performed by: INTERNAL MEDICINE

## 2017-11-21 PROCEDURE — 86308 HETEROPHILE ANTIBODY SCREEN: CPT | Performed by: FAMILY MEDICINE

## 2017-11-21 PROCEDURE — 87081 CULTURE SCREEN ONLY: CPT | Performed by: FAMILY MEDICINE

## 2017-11-21 PROCEDURE — 87880 STREP A ASSAY W/OPTIC: CPT | Performed by: FAMILY MEDICINE

## 2017-11-21 PROCEDURE — 99213 OFFICE O/P EST LOW 20 MIN: CPT | Performed by: FAMILY MEDICINE

## 2017-11-21 PROCEDURE — 36415 COLL VENOUS BLD VENIPUNCTURE: CPT | Performed by: INTERNAL MEDICINE

## 2017-11-21 PROCEDURE — 87517 HEPATITIS B DNA QUANT: CPT | Performed by: INTERNAL MEDICINE

## 2017-11-21 NOTE — PATIENT INSTRUCTIONS
Negative strep screen today.  In 2 days, you will be contacted for the culture result.  Further testing for mono will be done today.  For the meantime, avoid any activity that can hit your abdomen until you are informed about the mono test results.   * PHARYNGITIS (Sore Throat),REPORT PENDING    Pharyngitis (sore throat) is often due to a virus, but can also be caused by the  strep  bacteria. This is called  strep throat . Both viral and strep infection can cause throat pain that is worse when swallowing, aching all over with headache and fever. Both types of infections are contagious. They may be spread by coughing, kissing or touching others after touching your mouth or nose, so wash your hands often.  A test has been done to determine whether or not you have strep throat. If it is positive for strep infection you will usually need to take antibiotics. If the test is negative, you probably have a viral pharyngitis, and antibiotic treatment will not help you recover.  HOME CARE:    If your symptoms are severe, rest at home for the first 2-3 days. If you are told that your test is positive for strep, you should be off work and school for the first two days of antibiotic treatment. After that, you will no longer be as contagious.    Children: Use acetaminophen (Tylenol) for fever, fussiness or discomfort. In infants over six months of age, you may use ibuprofen (Children's Motrin) instead of Tylenol. [NOTE: If your child has chronic liver or kidney disease or ever had a stomach ulcer or GI bleeding, talk with your doctor before using these medicines.]   (Aspirin should never be used in anyone under 18 years of age who is ill with a fever. It may cause severe liver damage.)  Adults: You may use acetaminophen (Tylenol) 650 1000 mg every 6 hours or ibuprofen (Motrin, Advil) 600 mg every 6 8 hours with food to control pain, if you are able to take these medicines. [NOTE: If you have chronic liver or kidney disease or  ever had a stomach ulcer or GI bleeding, talk with your doctor before using these medicines.]    Throat lozenges or sprays (Chloraseptic and others), or gargling with warm salt water will reduce throat pain. Dissolve 1/2 teaspoon of salt in 1 glass of warm water. This is especially useful just before meals.     FOLLOW UP with your doctor as advised by our staff if you are not improving over the next week.  GET PROMPT MEDICAL ATTENTION  if any of the following occur:    Fever over 101 F (38.3 C) for more than three days    New or worsening ear pain, sinus pain or headache    Unable to swallow liquids or open your mouth wide due to throat pain    Trouble breathing    Muffled voice    New rash       7233-6995 [employer]. [address] [place] [address], [place] [address]. All rights reserved. This information is not intended as a substitute for professional medical care. Always follow your healthcare professional's instructions.  This information has been modified by your health care provider with permission from the publisher.

## 2017-11-21 NOTE — MR AVS SNAPSHOT
After Visit Summary   11/21/2017    Ricky Pruitt    MRN: [id number]           Patient Information     Date Of Birth          1980        Visit Information        Provider Department      11/21/2017 11:20 AM Kalyan Elizabeth MD [facility]        Today's Diagnoses     Acute pharyngitis, unspecified etiology    -  1    Viral hepatitis B chronic (H)          Care Instructions    Negative strep screen today.  In 2 days, you will be contacted for the culture result.  Further testing for mono will be done today.  For the meantime, avoid any activity that can hit your abdomen until you are informed about the mono test results.   * PHARYNGITIS (Sore Throat),REPORT PENDING    Pharyngitis (sore throat) is often due to a virus, but can also be caused by the  strep  bacteria. This is called  strep throat . Both viral and strep infection can cause throat pain that is worse when swallowing, aching all over with headache and fever. Both types of infections are contagious. They may be spread by coughing, kissing or touching others after touching your mouth or nose, so wash your hands often.  A test has been done to determine whether or not you have strep throat. If it is positive for strep infection you will usually need to take antibiotics. If the test is negative, you probably have a viral pharyngitis, and antibiotic treatment will not help you recover.  HOME CARE:    If your symptoms are severe, rest at home for the first 2-3 days. If you are told that your test is positive for strep, you should be off work and school for the first two days of antibiotic treatment. After that, you will no longer be as contagious.    Children: Use acetaminophen (Tylenol) for fever, fussiness or discomfort. In infants over six months of age, you may use ibuprofen (Children's Motrin) instead of Tylenol. [NOTE: If your child has chronic liver or kidney disease or ever had a stomach ulcer or GI bleeding, talk  with your doctor before using these medicines.]   (Aspirin should never be used in anyone under 18 years of age who is ill with a fever. It may cause severe liver damage.)  Adults: You may use acetaminophen (Tylenol) 650-1000 mg every 6 hours or ibuprofen (Motrin, Advil) 600 mg every 6-8 hours with food to control pain, if you are able to take these medicines. [NOTE: If you have chronic liver or kidney disease or ever had a stomach ulcer or GI bleeding, talk with your doctor before using these medicines.]    Throat lozenges or sprays (Chloraseptic and others), or gargling with warm salt water will reduce throat pain. Dissolve 1/2 teaspoon of salt in 1 glass of warm water. This is especially useful just before meals.     FOLLOW UP with your doctor as advised by our staff if you are not improving over the next week.  GET PROMPT MEDICAL ATTENTION  if any of the following occur:    Fever over 101 F (38.3 C) for more than three days    New or worsening ear pain, sinus pain or headache    Unable to swallow liquids or open your mouth wide due to throat pain    Trouble breathing    Muffled voice    New rash       2341-8375 [employer]. [address] [place] [address]. All rights reserved. This information is not intended as a substitute for professional medical care. Always follow your healthcare professional's instructions.  This information has been modified by your health care provider with permission from the publisher.            Follow-ups after your visit        Follow-up notes from your care team     Return if symptoms worsen or fail to improve.      Your next 10 appointments already scheduled     Feb 06, 2018  9:40 AM CST   (Arrive by 9:25 AM)   Return General Liver with Marium Terry MD   [facility] Hepatology ([facility])    [address]  [address] [facility] [address] [phone]            Apr 03, 2018 10:00 AM CDT   (Arrive by 9:45  "AM)   Return General Liver with Marium Terry MD   [facility] Hepatology ([facility] [place])    [address]  3rd Floor  [facility] [address] [phone]              Who to contact     If you have questions or need follow up information about today's clinic visit or your schedule please contact [facility] directly at [phone].  Normal or non-critical lab and imaging results will be communicated to you by [employer], letter or phone within 4 business days after the clinic has received the results. If you do not hear from us within 7 days, please contact the clinic through [employer] or phone. If you have a critical or abnormal lab result, we will notify you by phone as soon as possible.  Submit refill requests through [employer] or call your pharmacy and they will forward the refill request to us. Please allow 3 business days for your refill to be completed.          Additional Information About Your Visit        [employer] Information     [employer] gives you secure access to your electronic health record. If you see a primary care provider, you can also send messages to your care team and make appointments. If you have questions, please call your primary care clinic.  If you do not have a primary care provider, please call [phone] and they will assist you.        Care EveryWhere ID     This is your Care EveryWhere ID. This could be used by other organizations to access your [place] medical records  [id number]        Your Vitals Were     Pulse Temperature Height Pulse Oximetry BMI (Body Mass Index)       96 96.6  F (35.9  C) (Tympanic) 5' 9\" (1.753 m) 94% 35 kg/m2        Blood Pressure from Last 3 Encounters:   11/21/17 129/76   09/27/17 128/87   06/21/17 113/80    Weight from Last 3 Encounters:   11/21/17 237 lb (107.5 kg)   09/27/17 238 lb 6.4 oz (108.1 kg)   06/21/17 237 lb (107.5 kg)              We Performed the Following     Beta strep group A culture  "    CBC with platelets differential     Hep B Virus DNA Quant Real Time PCR     Hepatic panel     Lipid panel reflex to direct LDL     Mononucleosis screen     Strep, Rapid Screen        Primary Care Provider Office Phone # Fax #    R Sin Knight [phone] [address] [facility] [address]        Equal Access to Services     COURTNEY JORGENSEN : Hadii aad ku hadasho Soomaali, waaxda luqadaha, qaybta kaalmada adeegyada, waxay maryin hayaan adekassi leóntuancris clifford. So [facility] [phone].    ATENCIÓN: Si habla español, tiene a storm disposición servicios gratuitos de asistencia lingüística. [facility] [phone].    We comply with applicable federal civil rights laws and [place] laws. We do not discriminate on the basis of race, color, national origin, age, disability, sex, sexual orientation, or gender identity.            Thank you!     Thank you for choosing [facility]  for your care. Our goal is always to provide you with excellent care. Hearing back from our patients is one way we can continue to improve our services. Please take a few minutes to complete the written survey that you may receive in the mail after your visit with us. Thank you!             Your Updated Medication List - Protect others around you: Learn how to safely use, store and throw away your medicines at www.disposemymeds.org.          This list is accurate as of: 11/21/17 11:55 AM.  Always use your most recent med list.                   Brand Name Dispense Instructions for use Diagnosis    tenofovir 300 MG tablet    VIREAD    90 tablet    Take 1 tablet (300 mg) by mouth daily    Viral hepatitis B chronic (H)

## 2017-11-21 NOTE — LETTER
November 22, 2017      Ricky Pruitt  [address]  [place] [address]            The results of your recent throat culture were negative.  If you have any further questions or concerns please contact the clinic            Sincerely,        Kalyan Elizabeth MD/carla

## 2017-11-21 NOTE — NURSING NOTE
"Chief Complaint   Patient presents with     Cough     Pt has had a cough for the past wk.       Initial /76  Pulse 96  Temp 96.6  F (35.9  C) (Tympanic)  Ht 5' 9\" (1.753 m)  Wt 237 lb (107.5 kg)  SpO2 94%  BMI 35 kg/m2 Estimated body mass index is 35 kg/(m^2) as calculated from the following:    Height as of this encounter: 5' 9\" (1.753 m).    Weight as of this encounter: 237 lb (107.5 kg).  Medication Reconciliation: complete  Gricelda Elizondo CMA    "

## 2017-11-21 NOTE — PROGRESS NOTES
"  SUBJECTIVE:   Ricky Pruitt is a 37 year old male who presents to clinic today for the following health issues:  Chief Complaint   Patient presents with     Cough     Pt has had a cough for the past wk.     ENT Symptoms             Symptoms: cc Present Absent Comment   Fever/Chills   x Last wk had sweats, chills   Fatigue  x     Muscle Aches  x  Chest discomfort today   Eye Irritation   x    Sneezing   x    Nasal Otilio/Drg  x  Drainage, mixed with blood yesterday   Sinus Pressure/Pain   x    Loss of smell   x    Dental pain   x    Sore Throat x   Started 3 days ago; \"tolerable\" unless swallowing when it becomes moderately painful.  Patient has been on \"liquid diet since Thursday\".    Swollen Glands x      Ear Pain/Fullness   x    Cough x   Started 8 days ago, and first symptom of his current illness.   Wheeze   x    Chest Pain x   Feels tight   Shortness of breath  x  Worse today   Rash   x    Other         Symptom duration:  8 days, cough getting worse   Symptom severity:  moderate   Treatments tried:  ibuprofen, jean-claude seltzer   Contacts:  none     Verified above history with patient.      Problem list and histories reviewed & adjusted, as indicated.  Additional history: as documented    Patient Active Problem List   Diagnosis     Viral hepatitis B chronic (H)     Hand dermatitis     Obesity     Chronic idiopathic urticaria     Diagnostic skin and sensitization tests (aka ALLERGENS)     Seasonal allergic rhinitis     Nonalcoholic hepatosteatosis     Past Surgical History:   Procedure Laterality Date     APPENDECTOMY  2009       Social History   Substance Use Topics     Smoking status: Never Smoker     Smokeless tobacco: Never Used     Alcohol use No     Family History   Problem Relation Age of Onset     Liver Disease Mother      Hepatitis B     Eye Disorder Father      Liver Disease Sister      Hepatitis B     Eye Disorder Sister          Current Outpatient Prescriptions   Medication Sig Dispense Refill     " "tenofovir (VIREAD) 300 MG tablet Take 1 tablet (300 mg) by mouth daily 90 tablet 3     No Known Allergies      Reviewed and updated as needed this visit by clinical staffTobacco  Allergies  Meds  Problems  Med Hx  Surg Hx  Fam Hx  Soc Hx        Reviewed and updated as needed this visit by Provider  Allergies  Meds  Problems         ROS:  C: NEGATIVE for fever, chills, change in weight  I: NEGATIVE for worrisome rashes, moles or lesions  E: NEGATIVE for vision changes or irritation  ENT/MOUTH: see above  RESP:as above  CV: NEGATIVE for chest pain, palpitations or peripheral edema  GI: NEGATIVE for nausea, abdominal pain, heartburn, or change in bowel habits  M: NEGATIVE for significant arthralgias or myalgia    OBJECTIVE:                                                    /76  Pulse 96  Temp 96.6  F (35.9  C) (Tympanic)  Ht 5' 9\" (1.753 m)  Wt 237 lb (107.5 kg)  SpO2 94%  BMI 35 kg/m2  Body mass index is 35 kg/(m^2).  GENERAL: obese,  alert and no distress  EYES: pink conjunctivae, no icterus  NECK: moderately tender bilateral adenoid areas; mild anterior cervical and submandibular LAD, no occupital, posterior cervical and supraclav LAD  HEENT: tympanic membrane intact and pearly bilaterally, nose with no congestion, no sinus tenderness, throat mildly erythematous, tonsils/adenoids grade 2 with no exudates, no oral ulcers  RESP: lungs clear to auscultation - no rales, no rhonchi, no wheezes  CV: regular rates and rhythm, normal S1 S2, no S3 or S4 and no murmur  SKIN:  Good turgor, no rashes    Diagnostic test results:  Diagnostic Test Results:  Results for orders placed or performed in visit on 11/21/17 (from the past 24 hour(s))   Strep, Rapid Screen   Result Value Ref Range    Specimen Description Throat     Rapid Strep A Screen       NEGATIVE: No Group A streptococcal antigen detected by immunoassay, await culture report.          ASSESSMENT/PLAN:                                               "        ICD-10-CM    1. Acute pharyngitis, unspecified etiology J02.9 Strep, Rapid Screen     Beta strep group A culture     Mononucleosis screen     CBC with platelets differential  Advised patient  of negative screen; will wait for culture.  More likely viral. Mono testing discussed and patient concurred.  Discussed with patient causes, course and treatment of viral illness.  Symptomatic measures discussed: oral fluids, soft food, OTC NSAIDs, throat lozenges, general hygiene to prevent spread.  Return precautions given.     2. Viral hepatitis B chronic (H) B18.1 Hep B Virus DNA Quant Real Time PCR     Hepatic panel     Lipid panel reflex to direct LDL  Released orders of his hepatologist.       Follow up with Provider - 2-3 days if worsening  or prn   Patient Instructions   Negative strep screen today.  In 2 days, you will be contacted for the culture result.  Further testing for mono will be done today.  For the meantime, avoid any activity that can hit your abdomen until you are informed about the mono test results.   * PHARYNGITIS (Sore Throat),REPORT PENDING    Pharyngitis (sore throat) is often due to a virus, but can also be caused by the  strep  bacteria. This is called  strep throat . Both viral and strep infection can cause throat pain that is worse when swallowing, aching all over with headache and fever. Both types of infections are contagious. They may be spread by coughing, kissing or touching others after touching your mouth or nose, so wash your hands often.  A test has been done to determine whether or not you have strep throat. If it is positive for strep infection you will usually need to take antibiotics. If the test is negative, you probably have a viral pharyngitis, and antibiotic treatment will not help you recover.  HOME CARE:    If your symptoms are severe, rest at home for the first 2-3 days. If you are told that your test is positive for strep, you should be off work and school for the  first two days of antibiotic treatment. After that, you will no longer be as contagious.    Children: Use acetaminophen (Tylenol) for fever, fussiness or discomfort. In infants over six months of age, you may use ibuprofen (Children's Motrin) instead of Tylenol. [NOTE: If your child has chronic liver or kidney disease or ever had a stomach ulcer or GI bleeding, talk with your doctor before using these medicines.]   (Aspirin should never be used in anyone under 18 years of age who is ill with a fever. It may cause severe liver damage.)  Adults: You may use acetaminophen (Tylenol) 650-1000 mg every 6 hours or ibuprofen (Motrin, Advil) 600 mg every 6-8 hours with food to control pain, if you are able to take these medicines. [NOTE: If you have chronic liver or kidney disease or ever had a stomach ulcer or GI bleeding, talk with your doctor before using these medicines.]    Throat lozenges or sprays (Chloraseptic and others), or gargling with warm salt water will reduce throat pain. Dissolve 1/2 teaspoon of salt in 1 glass of warm water. This is especially useful just before meals.     FOLLOW UP with your doctor as advised by our staff if you are not improving over the next week.  GET PROMPT MEDICAL ATTENTION  if any of the following occur:    Fever over 101 F (38.3 C) for more than three days    New or worsening ear pain, sinus pain or headache    Unable to swallow liquids or open your mouth wide due to throat pain    Trouble breathing    Muffled voice    New rash       6038-2477 [employer]. [address] [place] [address], [place] [address]. All rights reserved. This information is not intended as a substitute for professional medical care. Always follow your healthcare professional's instructions.  This information has been modified by your health care provider with permission from the publisher.        Kalyan Elizabeth MD  [facility]

## 2017-11-22 LAB
BACTERIA SPEC CULT: NORMAL
SPECIMEN SOURCE: NORMAL

## 2017-11-24 LAB
HBV DNA SERPL NAA+PROBE-ACNC: 771 [IU]/ML
HBV DNA SERPL NAA+PROBE-LOG IU: 2.9 {LOG_IU}/ML

## 2017-12-04 ENCOUNTER — OFFICE VISIT (OUTPATIENT)
Dept: FAMILY MEDICINE | Facility: CLINIC | Age: 37
End: 2017-12-04
Payer: COMMERCIAL

## 2017-12-04 ENCOUNTER — RADIANT APPOINTMENT (OUTPATIENT)
Dept: GENERAL RADIOLOGY | Facility: CLINIC | Age: 37
End: 2017-12-04
Attending: PHYSICIAN ASSISTANT
Payer: COMMERCIAL

## 2017-12-04 VITALS
HEART RATE: 106 BPM | HEIGHT: 69 IN | TEMPERATURE: 99 F | SYSTOLIC BLOOD PRESSURE: 114 MMHG | OXYGEN SATURATION: 96 % | DIASTOLIC BLOOD PRESSURE: 75 MMHG | WEIGHT: 233.7 LBS | BODY MASS INDEX: 34.61 KG/M2

## 2017-12-04 DIAGNOSIS — R09.81 NASAL CONGESTION: ICD-10-CM

## 2017-12-04 DIAGNOSIS — R05.3 PERSISTENT COUGH FOR 3 WEEKS OR LONGER: Primary | ICD-10-CM

## 2017-12-04 DIAGNOSIS — R05.3 PERSISTENT COUGH FOR 3 WEEKS OR LONGER: ICD-10-CM

## 2017-12-04 DIAGNOSIS — R07.0 THROAT PAIN: ICD-10-CM

## 2017-12-04 PROCEDURE — 99214 OFFICE O/P EST MOD 30 MIN: CPT | Performed by: PHYSICIAN ASSISTANT

## 2017-12-04 PROCEDURE — 71020 XR CHEST 2 VW: CPT

## 2017-12-04 RX ORDER — DOXYCYCLINE 100 MG/1
100 CAPSULE ORAL 2 TIMES DAILY
Qty: 20 CAPSULE | Refills: 0 | Status: SHIPPED | OUTPATIENT
Start: 2017-12-04 | End: 2017-12-14

## 2017-12-04 RX ORDER — FLUTICASONE PROPIONATE 50 MCG
1-2 SPRAY, SUSPENSION (ML) NASAL DAILY
Qty: 1 BOTTLE | Refills: 1 | Status: SHIPPED | OUTPATIENT
Start: 2017-12-04 | End: 2018-02-07

## 2017-12-04 NOTE — MR AVS SNAPSHOT
After Visit Summary   12/4/2017    Ricky Pruitt    MRN: [id number]           Patient Information     Date Of Birth          1980        Visit Information        Provider Department      12/4/2017 1:20 PM Aide Mcclure PA-C [facility]        Today's Diagnoses     Persistent cough for 3 weeks or longer    -  1    Throat pain        Nasal congestion          Care Instructions    Have plenty of rest and fluids  Humidified air can be very helpful with cough  Doxycycline antibiotics  flonase nasal spray for congestion  Follow up if worsening symptoms or if not improving  Be seen urgently if worsening breathing           Follow-ups after your visit        Your next 10 appointments already scheduled     Feb 06, 2018  9:40 AM CST   (Arrive by 9:25 AM)   Return General Liver with Marium Terry MD   [facility])    [address] [place] [id number] [phone]            Apr 03, 2018 10:00 AM CDT   (Arrive by 9:45 AM)   Return General Liver with Marium Terry MD   [facility])    [address] [place] [id number] [phone]              Who to contact     Normal or non-critical lab and imaging results will be communicated to you by MyChart, letter or phone within 4 business days after the clinic has received the results. If you do not hear from us within 7 days, please contact the clinic through MyChart or phone. If you have a critical or abnormal lab result, we will notify you by phone as soon as possible.  Submit refill requests through [employer] or call your pharmacy and they will forward the refill request to us. Please allow 3 business days for your refill to be completed.          If you need to speak with a  for additional information , please call: [phone]             Additional Information  "About Your Visit        [employer] Information     [employer] gives you secure access to your electronic health record. If you see a primary care provider, you can also send messages to your care team and make appointments. If you have questions, please call your primary care clinic.  If you do not have a primary care provider, please call [phone] and they will assist you.        Care EveryWhere ID     This is your Care EveryWhere ID. This could be used by other organizations to access your [facility] medical records  [id number]        Your Vitals Were     Pulse Temperature Height Pulse Oximetry BMI (Body Mass Index)       106 99  F (37.2  C) (Tympanic) 5' 9\" (1.753 m) 96% 34.51 kg/m2        Blood Pressure from Last 3 Encounters:   12/04/17 114/75   11/21/17 129/76   09/27/17 128/87    Weight from Last 3 Encounters:   12/04/17 233 lb 11.2 oz (106 kg)   11/21/17 237 lb (107.5 kg)   09/27/17 238 lb 6.4 oz (108.1 kg)                 Today's Medication Changes          These changes are accurate as of: 12/4/17  2:08 PM.  If you have any questions, ask your nurse or doctor.               Start taking these medicines.        Dose/Directions    doxycycline monohydrate 100 MG capsule   Used for:  Persistent cough for 3 weeks or longer   Started by:  Aide Mcclure PA-C        Dose:  100 mg   Take 1 capsule (100 mg) by mouth 2 times daily for 10 days   Quantity:  20 capsule   Refills:  0       fluticasone 50 MCG/ACT spray   Commonly known as:  FLONASE   Used for:  Nasal congestion   Started by:  Aide Mcclure PA-C        Dose:  1-2 spray   Spray 1-2 sprays into both nostrils daily   Quantity:  1 Bottle   Refills:  1            Where to get your medicines      These medications were sent to [facility] - [address]  [address] [address]     Phone:  [phone]     doxycycline monohydrate 100 MG capsule    fluticasone 50 MCG/ACT spray                Primary Care " Provider Office Phone # Fax #    R Sin Knight [phone] [address] [facility] [address]        Equal Access to Services     COURTNEY JORGENSEN : Jose salazar radha Trinh, wamatheusda luqmian, qatawanata kamauricioda phu, miles mathew latomviktoria clifford. So [facility] [phone].    ATENCIÓN: Si habla español, tiene a storm disposición servicios gratuitos de asistencia lingüística. Llame al [phone].    We comply with applicable federal civil rights laws and [place] laws. We do not discriminate on the basis of race, color, national origin, age, disability, sex, sexual orientation, or gender identity.            Thank you!     Thank you for choosing [facility]  for your care. Our goal is always to provide you with excellent care. Hearing back from our patients is one way we can continue to improve our services. Please take a few minutes to complete the written survey that you may receive in the mail after your visit with us. Thank you!             Your Updated Medication List - Protect others around you: Learn how to safely use, store and throw away your medicines at www.disposemymeds.org.          This list is accurate as of: 12/4/17  2:08 PM.  Always use your most recent med list.                   Brand Name Dispense Instructions for use Diagnosis    doxycycline monohydrate 100 MG capsule     20 capsule    Take 1 capsule (100 mg) by mouth 2 times daily for 10 days    Persistent cough for 3 weeks or longer       fluticasone 50 MCG/ACT spray    FLONASE    1 Bottle    Spray 1-2 sprays into both nostrils daily    Nasal congestion       tenofovir 300 MG tablet    VIREAD    90 tablet    Take 1 tablet (300 mg) by mouth daily    Viral hepatitis B chronic (H)

## 2017-12-04 NOTE — PROGRESS NOTES
SUBJECTIVE:                                                    Ricky Pruitt is a 37 year old male who presents to clinic today for the following health issues:    ENT Symptoms             Symptoms: cc Present Absent Comment   Fever/Chills  x  Fevers right before Thanksgiving felt warm but never checked his temp.  Feels warm today    Fatigue  x     Muscle Aches   x    Eye Irritation       Sneezing       Nasal Otilio/Drg x x     Sinus Pressure/Pain   x    Loss of smell   x    Dental pain   x    Sore Throat  x  Irritation due to cough   Swollen Glands   x    Ear Pain/Fullness   x    Cough x x     Wheeze   x    Chest Pain   x    Shortness of breath   x    Rash   x    Other  x  Vomiting once this morning when brushing his teeth, did notice some blood     Symptom duration:  Started 11/13/2017, was seen 11/21/2017 since then he did feel little better.  Started coughing again yesterday   Symptom severity:  Moderate   Treatments tried:  cough drops   Contacts:  None     Feels like something is in throat, since last visit, sometimes. More of a sore throat feeling, maybe mucus is there and triggers coughing.  Still eating/drinking and swallowing okay  He did have flu shot this year  Started to feel better then took a turn for the worse  He vomited mucus this morning he suspected was postnasal drip, tint of blood. More like a gag/cough    Problem list and histories reviewed & adjusted, as indicated.  Additional history: none    Patient Active Problem List   Diagnosis     Viral hepatitis B chronic (H)     Hand dermatitis     Obesity     Chronic idiopathic urticaria     Diagnostic skin and sensitization tests (aka ALLERGENS)     Seasonal allergic rhinitis     Nonalcoholic hepatosteatosis     Past Surgical History:   Procedure Laterality Date     APPENDECTOMY  2009       Social History   Substance Use Topics     Smoking status: Never Smoker     Smokeless tobacco: Never Used     Alcohol use No     Family History   Problem Relation  "Age of Onset     Liver Disease Mother      Hepatitis B     Eye Disorder Father      Liver Disease Sister      Hepatitis B     Eye Disorder Sister          Labs reviewed in EPIC      ROS:Other than noted above, general, HEENT, respiratory, cardiac, MS, and gastrointestinal systems are negative.     OBJECTIVE:                                                    /75  Pulse 114  Temp 99  F (37.2  C) (Tympanic)  Ht 5' 9\" (1.753 m)  Wt 233 lb 11.2 oz (106 kg)  SpO2 95%  BMI 34.51 kg/m2 Body mass index is 34.51 kg/(m^2).   GENERAL: healthy, alert, well nourished, well hydrated, no distress  HENT: ear canals- normal; TMs- normal; Nose- normal; Mouth- no ulcers, no lesions  NECK: no tenderness, no adenopathy, no asymmetry, no masses, no stiffness; thyroid- normal to palpation  RESP: lungs clear to auscultation - no rales, no rhonchi, no wheezes  CV: regular rates and rhythm, normal S1 S2, no S3 or S4 and no murmur, no click or rub -  ABDOMEN: soft, no tenderness, no  hepatosplenomegaly, no masses, normal bowel sounds      CXR - NEGATIVE   I independently viewed the x-ray, discussed with patient, and am awaiting radiology read.     Patient well appearing, breathing easily in clinic, speaking in full sentences easily, no signs of cyanosis or respiratory distress.      ASSESSMENT/PLAN:                                                      ASSESSMENT/PLAN:      ICD-10-CM    1. Persistent cough for 3 weeks or longer R05 XR Chest 2 Views     doxycycline monohydrate 100 MG capsule   2. Throat pain R07.0    3. Nasal congestion R09.81 fluticasone (FLONASE) 50 MCG/ACT spray       Patient Instructions   Have plenty of rest and fluids  Humidified air can be very helpful with cough  Doxycycline antibiotics  flonase nasal spray for congestion  Follow up if worsening symptoms or if not improving  Be seen urgently if worsening breathing     Aide Mcclure PA-C   [facility]    "

## 2017-12-04 NOTE — PATIENT INSTRUCTIONS
Have plenty of rest and fluids  Humidified air can be very helpful with cough  Doxycycline antibiotics  flonase nasal spray for congestion  Follow up if worsening symptoms or if not improving  Be seen urgently if worsening breathing

## 2017-12-04 NOTE — NURSING NOTE
"Chief Complaint   Patient presents with     Cough     Vomiting       Initial /75  Pulse 114  Temp 99  F (37.2  C) (Tympanic)  Ht 5' 9\" (1.753 m)  Wt 233 lb 11.2 oz (106 kg)  SpO2 95%  BMI 34.51 kg/m2 Estimated body mass index is 34.51 kg/(m^2) as calculated from the following:    Height as of this encounter: 5' 9\" (1.753 m).    Weight as of this encounter: 233 lb 11.2 oz (106 kg).  Medication Reconciliation: complete   Maureen Dewitt CMA  "

## 2017-12-18 DIAGNOSIS — B18.1 VIRAL HEPATITIS B CHRONIC (H): ICD-10-CM

## 2017-12-18 RX ORDER — TENOFOVIR DISOPROXIL FUMARATE 300 MG/1
300 TABLET, FILM COATED ORAL DAILY
Qty: 90 TABLET | Refills: 3 | Status: SHIPPED | OUTPATIENT
Start: 2017-12-18 | End: 2019-02-07

## 2018-01-03 DIAGNOSIS — B18.1 VIRAL HEPATITIS B CHRONIC (H): Primary | ICD-10-CM

## 2018-02-07 ENCOUNTER — OFFICE VISIT (OUTPATIENT)
Dept: GASTROENTEROLOGY | Facility: CLINIC | Age: 38
End: 2018-02-07
Attending: INTERNAL MEDICINE
Payer: COMMERCIAL

## 2018-02-07 VITALS
OXYGEN SATURATION: 97 % | BODY MASS INDEX: 34.66 KG/M2 | TEMPERATURE: 98.2 F | DIASTOLIC BLOOD PRESSURE: 85 MMHG | WEIGHT: 234 LBS | HEART RATE: 93 BPM | RESPIRATION RATE: 18 BRPM | HEIGHT: 69 IN | SYSTOLIC BLOOD PRESSURE: 120 MMHG

## 2018-02-07 DIAGNOSIS — K76.0 NONALCOHOLIC HEPATOSTEATOSIS: ICD-10-CM

## 2018-02-07 DIAGNOSIS — E66.811 CLASS 1 OBESITY WITHOUT SERIOUS COMORBIDITY WITH BODY MASS INDEX (BMI) OF 34.0 TO 34.9 IN ADULT, UNSPECIFIED OBESITY TYPE: ICD-10-CM

## 2018-02-07 DIAGNOSIS — B18.1 VIRAL HEPATITIS B CHRONIC (H): Primary | ICD-10-CM

## 2018-02-07 DIAGNOSIS — B18.1 CHRONIC VIRAL HEPATITIS B WITHOUT DELTA AGENT AND WITHOUT COMA (H): ICD-10-CM

## 2018-02-07 PROCEDURE — G0463 HOSPITAL OUTPT CLINIC VISIT: HCPCS | Mod: ZF

## 2018-02-07 ASSESSMENT — PAIN SCALES - GENERAL: PAINLEVEL: NO PAIN (0)

## 2018-02-07 NOTE — PROGRESS NOTES
[facility]    Hepatology follow-up    Follow-up visit for follow up Hep B/ARAUJO    Subjective:  37 year old H[place] male presenting for follow-up of Hepatitis B, diagnosed 9 years ago, as well as fatty liver disease. He is maintained on tenofovir. Liver biopsy in 2015 showing Steatohepatitis with mild nonbridging pericellular fibrosis, mildly active hepatitis B) Patient has a history of stopping medication on his own early on in his diagnosis as well as earlier this year.     Patient has been compliant with his medication since last visit. Over the past three years, patient has gained 15 lbs. He attributes most of his weight gain due to lack of exercise with two young children at home and one on the way. He feels he makes good choices in regards to his nutrition. No changes in medication.     Patient denies jaundice, lower extremity edema, abdominal distension, lethargy or confusion. Patient denies melena, hematemesis or hematochezia. Patient denies fevers, sweats or chills.      Last HBV DNA was 774, which continues to trend down. Early this year while patient was off the medication, his HBV DNA was >1.7 million in June 2017. Last labs done in November 2017 showing TBili 0.8 Alk Phos 177(decrease)    (mild decrease) Platelets 214.       Medical hx Surgical hx   Past Medical History:   Diagnosis Date     Diagnostic skin and sensitization tests (aka ALLERGENS) 5/13/15 skin tests pos. for:  cat/dog/DM/M/T/G/W     Hepatitis B      Seasonal allergic rhinitis       Past Surgical History:   Procedure Laterality Date     APPENDECTOMY  2009          Medications  Prior to Admission medications    Medication Sig Start Date End Date Taking? Authorizing Provider   tenofovir (VIREAD) 300 MG tablet Take 1 tablet (300 mg) by mouth daily 12/18/17   Marium Terry MD   fluticasone (FLONASE) 50 MCG/ACT spray Spray 1-2 sprays into both nostrils daily 12/4/17   Aide Mcclure,  PA-C       Allergies  No Known Allergies    Review of systems  A 10-point review of systems was negative    Examination  There were no vitals taken for this visit.  There is no height or weight on file to calculate BMI.    Gen- well, NAD, A+Ox3, normal color  Lym- no palpable LAD  CVS- RRR  RS- CTA  Abd- obese, soft, nontender. No palpable organomegaly.   Extr- hands normal, no LUIS EDUARDO  Skin- no rash or jaundice  Neuro- no asterixis  Psych- normal mood    Laboratory  Lab Results   Component Value Date     06/19/2017    POTASSIUM 4.0 06/19/2017    CHLORIDE 103 06/19/2017    CO2 29 06/19/2017    BUN 13 06/19/2017    CR 0.86 06/19/2017       Lab Results   Component Value Date    BILITOTAL 0.8 11/21/2017     11/21/2017     11/21/2017    ALKPHOS 177 11/21/2017       Lab Results   Component Value Date    ALBUMIN 3.6 11/21/2017    PROTTOTAL 9.1 11/21/2017        Lab Results   Component Value Date    WBC 10.3 11/21/2017    HGB 16.5 11/21/2017    MCV 86 11/21/2017     11/21/2017       Lab Results   Component Value Date    INR 1.03 06/19/2017       Radiology  U/S Abdomen 2015: Moderate diffuse fatty infiltration of the liver. Otherwise unremarkable examination. Specifically, no focal hepatic mass is seen.    Assessment:   37 year old male with chronic hepatitis B virus infection, active disease on tenofovir. His HBV DNA continues to trend down. His LFT's continue to be persistently elevated despite improvement of his HBV DNA. His liver biopsy in 2015 did note more inflammation due to steatohepatitis. Discussed significance of weight loss for inflammation and histologic improvement of his liver disease. We also discussed importance of compliance with HBV medications. HCC screening for  men with Hepatitis will be started at the age of 40.      Plan  - Continue tenofovir for Hepatitis B  - Increasing regular exercise and fine tuning nutrition, especially carbohydrate intake  - Will order HBV DNA, BMP,  CBC, Hepatic Panel, INR today  - Follow up with Dr. Terry in 3 months    Shameka Sosa PA-C  Hepatology  [facility]    Ricky Pruitt was seen and evaluated today as apart of a shared APRN/PA visit. I reviewed today's Chronic active hepatitis B and ARAUJO. My key exam findings include obesity, non. Key management decisions made by me and carried out under my direction include: continue tenofovir, discussed significance of weight loss, repeat labs to be done at sooner convenience, follow up in 3 months    Marium Terry

## 2018-02-07 NOTE — NURSING NOTE
"Chief Complaint   Patient presents with     RECHECK     Hep B       Initial /85 (BP Location: Right arm, Patient Position: Sitting, Cuff Size: Adult Large)  Pulse 93  Temp 98.2  F (36.8  C) (Oral)  Resp 18  Ht 1.753 m (5' 9.02\")  Wt 106.1 kg (234 lb)  SpO2 97%  BMI 34.54 kg/m2 Estimated body mass index is 34.54 kg/(m^2) as calculated from the following:    Height as of this encounter: 1.753 m (5' 9.02\").    Weight as of this encounter: 106.1 kg (234 lb).  Medication Reconciliation: complete     Ifrah Lan [facility]  2/7/2018 9:58 AM        "

## 2018-02-07 NOTE — MR AVS SNAPSHOT
After Visit Summary   2/7/2018    Ricky Pruitt    MRN: [id number]           Patient Information     Date Of Birth          1980        Visit Information        Provider Department      2/7/2018 9:30 AM Marium Terry MD [facility]        Today's Diagnoses     Viral hepatitis B chronic (H)    -  1    Nonalcoholic hepatosteatosis        Chronic viral hepatitis B without delta agent and without coma (H)        Class 1 obesity without serious comorbidity with body mass index (BMI) of 34.0 to 34.9 in adult, unspecified obesity type           Follow-ups after your visit        Your next 10 appointments already scheduled     Apr 03, 2018 10:00 AM CDT   (Arrive by 9:45 AM)   Return General Liver with Marium Terry MD   [facility] ([facility] [place])    [address] [place] [address] [phone]              Future tests that were ordered for you today     Open Standing Orders        Priority Remaining Interval Expires Ordered    Hepatic panel [LAB20] Routine 2/2 Every 6 Months 2/7/2019 2/7/2018    Basic metabolic panel [LAB15] Routine 2/2 Every 6 Months 2/7/2019 2/7/2018    Hep B Virus DNA Quant Real Time PCR [DWT7005] Routine 2/2 Every 6 Months 2/7/2019 2/7/2018    CBC with platelets [KWX257] Routine 2/2 Every 6 Months 2/7/2019 2/7/2018    INR [NTK9623] Routine 2/2 Every 6 Months 2/7/2019 2/7/2018            Who to contact     If you have questions or need follow up information about today's clinic visit or your schedule please contact [facility]PAT[facility] directly at [phone].  Normal or non-critical lab and imaging results will be communicated to you by MyChart, letter or phone within 4 business days after the clinic has received the results. If you do not hear from us within 7 days, please contact the clinic through MyChart or phone. If you have a critical or abnormal lab result, we will notify you by  "phone as soon as possible.  Submit refill requests through Actions or call your pharmacy and they will forward the refill request to us. Please allow 3 business days for your refill to be completed.          Additional Information About Your Visit        SkyDoxharImmy Information     Actions gives you secure access to your electronic health record. If you see a primary care provider, you can also send messages to your care team and make appointments. If you have questions, please call your primary care clinic.  If you do not have a primary care provider, please call [phone] and they will assist you.        Care EveryWhere ID     This is your Care EveryWhere ID. This could be used by other organizations to access your [place] medical records  [id number]        Your Vitals Were     Pulse Temperature Respirations Height Pulse Oximetry BMI (Body Mass Index)    93 98.2  F (36.8  C) (Oral) 18 1.753 m (5' 9.02\") 97% 34.54 kg/m2       Blood Pressure from Last 3 Encounters:   02/07/18 120/85   12/04/17 114/75   11/21/17 129/76    Weight from Last 3 Encounters:   02/07/18 106.1 kg (234 lb)   12/04/17 106 kg (233 lb 11.2 oz)   11/21/17 107.5 kg (237 lb)               Primary Care Provider Office Phone # Fax #    R Sin Knight [phone] [address] [address]        Equal Access to Services     [facility] AH: Hadii aad ku hadasho Soomaali, waaxda luqadaha, qaybta kaalmada adeegyada, miles brandt . So [facility] [phone].    ATENCIÓN: Si habla español, tiene a storm disposición servicios gratuitos de asistencia lingüística. Llfercho al [phone].    We comply with applicable federal civil rights laws and [place] laws. We do not discriminate on the basis of race, color, national origin, age, disability, sex, sexual orientation, or gender identity.            Thank you!     Thank you for choosing [facility] HEPATOLOG[facility]  for your care. Our goal is always to " provide you with excellent care. Hearing back from our patients is one way we can continue to improve our services. Please take a few minutes to complete the written survey that you may receive in the mail after your visit with us. Thank you!             Your Updated Medication List - Protect others around you: Learn how to safely use, store and throw away your medicines at www.disposemymeds.org.          This list is accurate as of 2/7/18  9:39 PM.  Always use your most recent med list.                   Brand Name Dispense Instructions for use Diagnosis    tenofovir 300 MG tablet    VIREAD    90 tablet    Take 1 tablet (300 mg) by mouth daily    Viral hepatitis B chronic (H)

## 2018-02-07 NOTE — LETTER
2/7/2018      RE: Ricky Pruitt  [address]  [place] [address]       [facility]    Hepatology follow-up    Follow-up visit for follow up Hep B/ARAUJO    Subjective:  37 year old Hmong male presenting for follow-up of Hepatitis B, diagnosed 9 years ago, as well as fatty liver disease. He is maintained on tenofovir. Liver biopsy in 2015 showing Steatohepatitis with mild nonbridging pericellular fibrosis, mildly active hepatitis B) Patient has a history of stopping medication on his own early on in his diagnosis as well as earlier this year.     Patient has been compliant with his medication since last visit. Over the past three years, patient has gained 15 lbs. He attributes most of his weight gain due to lack of exercise with two young children at home and one on the way. He feels he makes good choices in regards to his nutrition. No changes in medication.     Patient denies jaundice, lower extremity edema, abdominal distension, lethargy or confusion. Patient denies melena, hematemesis or hematochezia. Patient denies fevers, sweats or chills.      Last HBV DNA was 774, which continues to trend down. Early this year while patient was off the medication, his HBV DNA was >1.7 million in June 2017. Last labs done in November 2017 showing TBili 0.8 Alk Phos 177(decrease)    (mild decrease) Platelets 214.       Medical hx Surgical hx   Past Medical History:   Diagnosis Date     Diagnostic skin and sensitization tests (aka ALLERGENS) 5/13/15 skin tests pos. for:  cat/dog/DM/M/T/G/W     Hepatitis B      Seasonal allergic rhinitis       Past Surgical History:   Procedure Laterality Date     APPENDECTOMY  2009          Medications  Prior to Admission medications    Medication Sig Start Date End Date Taking? Authorizing Provider   tenofovir (VIREAD) 300 MG tablet Take 1 tablet (300 mg) by mouth daily 12/18/17   Marium Terry MD   fluticasone (FLONASE) 50  MCG/ACT spray Spray 1-2 sprays into both nostrils daily 12/4/17   Aide Mcclure PA-C       Allergies  No Known Allergies    Review of systems  A 10-point review of systems was negative    Examination  There were no vitals taken for this visit.  There is no height or weight on file to calculate BMI.    Gen- well, NAD, A+Ox3, normal color  Lym- no palpable LAD  CVS- RRR  RS- CTA  Abd- obese, soft, nontender. No palpable organomegaly.   Extr- hands normal, no LUIS EDUARDO  Skin- no rash or jaundice  Neuro- no asterixis  Psych- normal mood    Laboratory  Lab Results   Component Value Date     06/19/2017    POTASSIUM 4.0 06/19/2017    CHLORIDE 103 06/19/2017    CO2 29 06/19/2017    BUN 13 06/19/2017    CR 0.86 06/19/2017       Lab Results   Component Value Date    BILITOTAL 0.8 11/21/2017     11/21/2017     11/21/2017    ALKPHOS 177 11/21/2017       Lab Results   Component Value Date    ALBUMIN 3.6 11/21/2017    PROTTOTAL 9.1 11/21/2017        Lab Results   Component Value Date    WBC 10.3 11/21/2017    HGB 16.5 11/21/2017    MCV 86 11/21/2017     11/21/2017       Lab Results   Component Value Date    INR 1.03 06/19/2017       Radiology  U/S Abdomen 2015: Moderate diffuse fatty infiltration of the liver. Otherwise unremarkable examination. Specifically, no focal hepatic mass is seen.    Assessment:   37 year old male with chronic hepatitis B virus infection, active disease on tenofovir. His HBV DNA continues to trend down. His LFT's continue to be persistently elevated despite improvement of his HBV DNA. His liver biopsy in 2015 did note more inflammation due to steatohepatitis. Discussed significance of weight loss for inflammation and histologic improvement of his liver disease. We also discussed importance of compliance with HBV medications. HCC screening for  men with Hepatitis will be started at the age of 40.      Plan  - Continue tenofovir for Hepatitis B  - Increasing regular exercise  and fine tuning nutrition, especially carbohydrate intake  - Will order HBV DNA, BMP, CBC, Hepatic Panel, INR today  - Follow up with Dr. Terry in 3 months    MANOLO LiangC  Hepatology  [facility]    Ricky Pruitt was seen and evaluated today as apart of a shared APRN/PA visit. I reviewed today's Chronic active hepatitis B and ARAUJO. My key exam findings include obesity, non. Key management decisions made by me and carried out under my direction include: continue tenofovir, discussed significance of weight loss, repeat labs to be done at sooner convenience, follow up in 3 months    Marium Terry

## 2018-02-14 ENCOUNTER — OFFICE VISIT (OUTPATIENT)
Dept: FAMILY MEDICINE | Facility: CLINIC | Age: 38
End: 2018-02-14
Payer: COMMERCIAL

## 2018-02-14 VITALS
DIASTOLIC BLOOD PRESSURE: 83 MMHG | WEIGHT: 230 LBS | TEMPERATURE: 101.2 F | HEIGHT: 69 IN | HEART RATE: 123 BPM | BODY MASS INDEX: 34.07 KG/M2 | SYSTOLIC BLOOD PRESSURE: 127 MMHG

## 2018-02-14 DIAGNOSIS — R07.0 THROAT PAIN: ICD-10-CM

## 2018-02-14 DIAGNOSIS — J02.0 STREP THROAT: Primary | ICD-10-CM

## 2018-02-14 LAB
DEPRECATED S PYO AG THROAT QL EIA: ABNORMAL
SPECIMEN SOURCE: ABNORMAL

## 2018-02-14 PROCEDURE — 99213 OFFICE O/P EST LOW 20 MIN: CPT | Performed by: NURSE PRACTITIONER

## 2018-02-14 PROCEDURE — 87880 STREP A ASSAY W/OPTIC: CPT | Performed by: NURSE PRACTITIONER

## 2018-02-14 RX ORDER — PENICILLIN V POTASSIUM 500 MG/1
500 TABLET, FILM COATED ORAL 2 TIMES DAILY
Qty: 20 TABLET | Refills: 0 | Status: SHIPPED | OUTPATIENT
Start: 2018-02-14 | End: 2019-01-04

## 2018-02-14 NOTE — PATIENT INSTRUCTIONS
Thank you for choosing [facility].  You may be receiving a survey in the mail from [employer] regarding your visit today.  Please take a few minutes to complete and return the survey to let us know how we are doing.      If you have questions or concerns, please contact us via Reissued or you can contact your care team at [phone].    Our Clinic hours are:  Monday 6:40 am  to 7:00 pm  Tuesday -Friday 6:40 am to 5:00 pm    The [place] outpatient lab hours are:  Monday - Friday 6:10 am to 4:45 pm  Saturdays 7:00 am to 11:00 am  Appointments are required, call [phone]    If you have clinical questions after hours or would like to schedule an appointment,  call the clinic at [phone].

## 2018-02-14 NOTE — NURSING NOTE
"Chief Complaint   Patient presents with     Pharyngitis       Initial /83 (BP Location: Left arm)  Pulse 123  Temp 101.2  F (38.4  C) (Tympanic)  Ht 5' 9\" (1.753 m)  Wt 230 lb (104.3 kg)  BMI 33.97 kg/m2 Estimated body mass index is 33.97 kg/(m^2) as calculated from the following:    Height as of this encounter: 5' 9\" (1.753 m).    Weight as of this encounter: 230 lb (104.3 kg).  Medication Reconciliation: complete  "

## 2018-02-14 NOTE — PROGRESS NOTES
"  SUBJECTIVE:   Ricky Pruitt is a 37 year old male who presents to clinic today for the following health issues:      ENT Symptoms             Symptoms: cc Present Absent Comment   Fever/Chills x x  Didn't check temp   Fatigue       Muscle Aches   x    Eye Irritation   x    Sneezing       Nasal Otilio/Drg  x     Sinus Pressure/Pain   x    Loss of smell       Dental pain   x    Sore Throat x x  Difficulty swallowing   Swollen Glands    unsure   Ear Pain/Fullness   x    Cough  x     Wheeze   x    Chest Pain   x    Shortness of breath   x    Rash       Other         Symptom duration:  chills and sore throat started yesterday   Symptom severity:  moderate   Treatments tried:  jean-claude carrasco   Contacts:  no known exposure to strep           Problem list and histories reviewed & adjusted, as indicated.  Additional history: as documented      Reviewed and updated as needed this visit by clinical staff  Tobacco  Allergies  Meds       Reviewed and updated as needed this visit by Provider         ROS:  Constitutional, HEENT, cardiovascular, pulmonary, gi and gu systems are negative, except as otherwise noted.    OBJECTIVE:     /83 (BP Location: Left arm)  Pulse 123  Temp 101.2  F (38.4  C) (Tympanic)  Ht 5' 9\" (1.753 m)  Wt 230 lb (104.3 kg)  BMI 33.97 kg/m2  Body mass index is 33.97 kg/(m^2).  GENERAL: healthy, alert and no distress  HENT: normal cephalic/atraumatic, ear canals and TM's normal and tonsillar erythema  NECK: no adenopathy, no asymmetry, masses, or scars and thyroid normal to palpation  RESP: lungs clear to auscultation - no rales, rhonchi or wheezes  CV: regular rate and rhythm, normal S1 S2, no S3 or S4, no murmur, click or rub, no peripheral edema and peripheral pulses strong    Diagnostic Test Results:  Results for orders placed or performed in visit on 02/14/18 (from the past 24 hour(s))   Rapid strep screen   Result Value Ref Range    Specimen Description Throat     Rapid Strep A Screen (A)      " POSITIVE: Group A Streptococcal antigen detected by immunoassay.       ASSESSMENT/PLAN:       ICD-10-CM    1. Strep throat J02.0 penicillin V potassium (VEETID) 500 MG tablet BID for 10 days   2. Throat pain R07.0 Rapid strep screen         The risks, benefits and treatment options of prescribed medications or other treatments have been discussed with the patient. The patient verbalized their understanding and should call or follow up if no improvement or if they develop further problems.    MARKOS Bro [facility]

## 2018-02-14 NOTE — MR AVS SNAPSHOT
After Visit Summary   2/14/2018    Ricky Pruitt    MRN: [id number]           Patient Information     Date Of Birth          1980        Visit Information        Provider Department      2/14/2018 10:20 AM Hortencia Conley APRN CNP [facility]        Today's Diagnoses     Strep throat    -  1    Throat pain          Care Instructions          Thank you for choosing [facility].  You may be receiving a survey in the mail from [facility]"[employer] regarding your visit today.  Please take a few minutes to complete and return the survey to let us know how we are doing.      If you have questions or concerns, please contact us via Eachpal or you can contact your care team at [phone].    Our Clinic hours are:  Monday 6:40 am  to 7:00 pm  Tuesday -Friday 6:40 am to 5:00 pm    The [place] outpatient lab hours are:  Monday - Friday 6:10 am to 4:45 pm  Saturdays 7:00 am to 11:00 am  Appointments are required, call [phone]    If you have clinical questions after hours or would like to schedule an appointment,  call the clinic at [phone].            Follow-ups after your visit        Your next 10 appointments already scheduled     Apr 03, 2018 10:00 AM CDT   (Arrive by 9:45 AM)   Return General Liver with Marium Terry MD   [facility] ([facility])    [address] [place] [address]  [address]  [facility] [address] [phone]              Who to contact     If you have questions or need follow up information about today's clinic visit or your schedule please contact [facility] directly at [phone].  Normal or non-critical lab and imaging results will be communicated to you by MyChart, letter or phone within 4 business days after the clinic has received the results. If you do not hear from us within 7 days, please contact the clinic through Aternityhart or phone. If you have a critical or abnormal lab result, we will  "notify you by phone as soon as possible.  Submit refill requests through [employer] or call your pharmacy and they will forward the refill request to us. Please allow 3 business days for your refill to be completed.          Additional Information About Your Visit        ExtricomharPPTV Information     [employer] gives you secure access to your electronic health record. If you see a primary care provider, you can also send messages to your care team and make appointments. If you have questions, please call your primary care clinic.  If you do not have a primary care provider, please call [phone] and they will assist you.        Care EveryWhere ID     This is your Care EveryWhere ID. This could be used by other organizations to access your [place] medical records  [id number]        Your Vitals Were     Pulse Temperature Height BMI (Body Mass Index)          123 101.2  F (38.4  C) (Tympanic) 5' 9\" (1.753 m) 33.97 kg/m2         Blood Pressure from Last 3 Encounters:   02/14/18 127/83   02/07/18 120/85   12/04/17 114/75    Weight from Last 3 Encounters:   02/14/18 230 lb (104.3 kg)   02/07/18 234 lb (106.1 kg)   12/04/17 233 lb 11.2 oz (106 kg)              We Performed the Following     Rapid strep screen          Today's Medication Changes          These changes are accurate as of 2/14/18 10:43 AM.  If you have any questions, ask your nurse or doctor.               Start taking these medicines.        Dose/Directions    penicillin V potassium 500 MG tablet   Commonly known as:  VEETID   Used for:  Strep throat   Started by:  Hortencia Conley APRN CNP        Dose:  500 mg   Take 1 tablet (500 mg) by mouth 2 times daily   Quantity:  20 tablet   Refills:  0            Where to get your medicines      These medications were sent to [facility] [place], [place] - [address]  [address] [facility] [address]     Phone:  [phone]     penicillin V potassium 500 MG tablet                " Primary Care Provider Office Phone # Fax #    R Sin Knight [phone] [address] [facility] [address]        Equal Access to Services     COURTNEY JORGENSEN : Jose salazar radha Trinh, wamatheusda luqadaha, qaybta kamauricioda phu, miles mathew laRosariolola clifford. So [facility] [phone].    ATENCIÓN: Si habla español, tiene a storm disposición servicios gratuitos de asistencia lingüística. Llame al [phone].    We comply with applicable federal civil rights laws and [place] laws. We do not discriminate on the basis of race, color, national origin, age, disability, sex, sexual orientation, or gender identity.            Thank you!     Thank you for choosing [facility]  for your care. Our goal is always to provide you with excellent care. Hearing back from our patients is one way we can continue to improve our services. Please take a few minutes to complete the written survey that you may receive in the mail after your visit with us. Thank you!             Your Updated Medication List - Protect others around you: Learn how to safely use, store and throw away your medicines at www.disposemymeds.org.          This list is accurate as of 2/14/18 10:43 AM.  Always use your most recent med list.                   Brand Name Dispense Instructions for use Diagnosis    penicillin V potassium 500 MG tablet    VEETID    20 tablet    Take 1 tablet (500 mg) by mouth 2 times daily    Strep throat       tenofovir 300 MG tablet    VIREAD    90 tablet    Take 1 tablet (300 mg) by mouth daily    Viral hepatitis B chronic (H)

## 2018-03-24 DIAGNOSIS — B18.1 CHRONIC VIRAL HEPATITIS B WITHOUT DELTA AGENT AND WITHOUT COMA (H): ICD-10-CM

## 2018-03-24 LAB
ALBUMIN SERPL-MCNC: 3.6 G/DL (ref 3.4–5)
ALP SERPL-CCNC: 155 U/L (ref 40–150)
ALT SERPL W P-5'-P-CCNC: 186 U/L (ref 0–70)
ANION GAP SERPL CALCULATED.3IONS-SCNC: 8 MMOL/L (ref 3–14)
AST SERPL W P-5'-P-CCNC: 126 U/L (ref 0–45)
BILIRUB DIRECT SERPL-MCNC: 0.2 MG/DL (ref 0–0.2)
BILIRUB SERPL-MCNC: 0.8 MG/DL (ref 0.2–1.3)
BUN SERPL-MCNC: 16 MG/DL (ref 7–30)
CALCIUM SERPL-MCNC: 8.8 MG/DL (ref 8.5–10.1)
CHLORIDE SERPL-SCNC: 103 MMOL/L (ref 94–109)
CO2 SERPL-SCNC: 25 MMOL/L (ref 20–32)
CREAT SERPL-MCNC: 0.85 MG/DL (ref 0.66–1.25)
ERYTHROCYTE [DISTWIDTH] IN BLOOD BY AUTOMATED COUNT: 13.1 % (ref 10–15)
GFR SERPL CREATININE-BSD FRML MDRD: >90 ML/MIN/1.7M2
GLUCOSE SERPL-MCNC: 149 MG/DL (ref 70–99)
HCT VFR BLD AUTO: 46.2 % (ref 40–53)
HGB BLD-MCNC: 16 G/DL (ref 13.3–17.7)
INR PPP: 1.06 (ref 0.86–1.14)
MCH RBC QN AUTO: 29.9 PG (ref 26.5–33)
MCHC RBC AUTO-ENTMCNC: 34.6 G/DL (ref 31.5–36.5)
MCV RBC AUTO: 86 FL (ref 78–100)
PLATELET # BLD AUTO: 219 10E9/L (ref 150–450)
POTASSIUM SERPL-SCNC: 3.7 MMOL/L (ref 3.4–5.3)
PROT SERPL-MCNC: 8.6 G/DL (ref 6.8–8.8)
RBC # BLD AUTO: 5.35 10E12/L (ref 4.4–5.9)
SODIUM SERPL-SCNC: 136 MMOL/L (ref 133–144)
WBC # BLD AUTO: 10.2 10E9/L (ref 4–11)

## 2018-03-24 PROCEDURE — 85027 COMPLETE CBC AUTOMATED: CPT | Performed by: PHYSICIAN ASSISTANT

## 2018-03-24 PROCEDURE — 80048 BASIC METABOLIC PNL TOTAL CA: CPT | Performed by: PHYSICIAN ASSISTANT

## 2018-03-24 PROCEDURE — 80076 HEPATIC FUNCTION PANEL: CPT | Performed by: PHYSICIAN ASSISTANT

## 2018-03-24 PROCEDURE — 85610 PROTHROMBIN TIME: CPT | Performed by: PHYSICIAN ASSISTANT

## 2018-03-24 PROCEDURE — 36415 COLL VENOUS BLD VENIPUNCTURE: CPT | Performed by: PHYSICIAN ASSISTANT

## 2018-03-24 PROCEDURE — 87517 HEPATITIS B DNA QUANT: CPT | Performed by: PHYSICIAN ASSISTANT

## 2018-03-27 LAB
HBV DNA SERPL NAA+PROBE-ACNC: 312 [IU]/ML
HBV DNA SERPL NAA+PROBE-LOG IU: 2.5 {LOG_IU}/ML

## 2018-04-05 ENCOUNTER — OFFICE VISIT (OUTPATIENT)
Dept: GASTROENTEROLOGY | Facility: CLINIC | Age: 38
End: 2018-04-05
Attending: PHYSICIAN ASSISTANT
Payer: COMMERCIAL

## 2018-04-05 ENCOUNTER — DOCUMENTATION ONLY (OUTPATIENT)
Dept: TRANSPLANT | Facility: CLINIC | Age: 38
End: 2018-04-05

## 2018-04-05 VITALS
TEMPERATURE: 98.4 F | WEIGHT: 228.6 LBS | BODY MASS INDEX: 33.86 KG/M2 | OXYGEN SATURATION: 95 % | HEIGHT: 69 IN | SYSTOLIC BLOOD PRESSURE: 116 MMHG | DIASTOLIC BLOOD PRESSURE: 80 MMHG | HEART RATE: 83 BPM

## 2018-04-05 DIAGNOSIS — B18.1 VIRAL HEPATITIS B CHRONIC (H): Primary | ICD-10-CM

## 2018-04-05 DIAGNOSIS — K76.0 NONALCOHOLIC HEPATOSTEATOSIS: ICD-10-CM

## 2018-04-05 PROCEDURE — G0463 HOSPITAL OUTPT CLINIC VISIT: HCPCS | Mod: ZF

## 2018-04-05 ASSESSMENT — PAIN SCALES - GENERAL: PAINLEVEL: NO PAIN (0)

## 2018-04-05 NOTE — MR AVS SNAPSHOT
After Visit Summary   4/5/2018    Ricky Pruitt    MRN: [id number]           Patient Information     Date Of Birth          1980        Visit Information        Provider Department      4/5/2018 10:30 AM Shameka Sosa PA-C [facility]        Today's Diagnoses     Viral hepatitis B chronic (H)    -  1    Nonalcoholic hepatosteatosis           Follow-ups after your visit        Follow-up notes from your care team     Return in about 1 month (around 5/5/2018).      Your next 10 appointments already scheduled     May 21, 2018  9:45 AM CDT   (Arrive by 9:30 AM)   Return General Liver with MIMA Muhammad [facility] ([facility] [place])    [address] [place] [address]  [address]  [facility] [address] [phone]              Future tests that were ordered for you today     Open Future Orders        Priority Expected Expires Ordered    Hepatic panel Routine 5/5/2018 5/5/2018 4/5/2018    Hemoglobin A1c Routine  5/5/2018 4/5/2018            Who to contact     If you have questions or need follow up information about today's clinic visit or your schedule please contact [facility] HEPATOLOG[facility] directly at [phone].  Normal or non-critical lab and imaging results will be communicated to you by MyChart, letter or phone within 4 business days after the clinic has received the results. If you do not hear from us within 7 days, please contact the clinic through HireIQ Solutionshart or phone. If you have a critical or abnormal lab result, we will notify you by phone as soon as possible.  Submit refill requests through [employer] or call your pharmacy and they will forward the refill request to us. Please allow 3 business days for your refill to be completed.          Additional Information About Your Visit        Hire[employer] Solutionshart Information     [employer] gives you secure access to your electronic health record. If you see a primary care provider, you can also send messages to your  "care team and make appointments. If you have questions, please call your primary care clinic.  If you do not have a primary care provider, please call [phone] and they will assist you.        Care EveryWhere ID     This is your Care EveryWhere ID. This could be used by other organizations to access your [place] medical records  [id number]        Your Vitals Were     Pulse Temperature Height Pulse Oximetry BMI (Body Mass Index)       83 98.4  F (36.9  C) (Oral) 1.753 m (5' 9\") 95% 33.76 kg/m2        Blood Pressure from Last 3 Encounters:   04/05/18 116/80   02/14/18 127/83   02/07/18 120/85    Weight from Last 3 Encounters:   04/05/18 103.7 kg (228 lb 9.6 oz)   02/14/18 104.3 kg (230 lb)   02/07/18 106.1 kg (234 lb)               Primary Care Provider Office Phone # Fax #    R Sin Knight [phone] [address] [address]        Equal Access to Services     [facility]: Hadii albin salazar hadasho Soomaali, waaxda luqadaha, qaybta kaalmada phu, miles clifford. So [facility] [place] [phone].    ATENCIÓN: Si habla español, tiene a storm disposición servicios gratuitos de asistencia lingüística. [facility] [phone].    We comply with applicable federal civil rights laws and [place] laws. We do not discriminate on the basis of race, color, national origin, age, disability, sex, sexual orientation, or gender identity.            Thank you!     Thank you for choosing [facility] HEPATOLOGY  for your care. Our goal is always to provide you with excellent care. Hearing back from our patients is one way we can continue to improve our services. Please take a few minutes to complete the written survey that you may receive in the mail after your visit with us. Thank you!             Your Updated Medication List - Protect others around you: Learn how to safely use, store and throw away your medicines at www.disposemymeds.org.          This list is accurate as of " 4/5/18 12:09 PM.  Always use your most recent med list.                   Brand Name Dispense Instructions for use Diagnosis    penicillin V potassium 500 MG tablet    VEETID    20 tablet    Take 1 tablet (500 mg) by mouth 2 times daily    Strep throat       tenofovir 300 MG tablet    VIREAD    90 tablet    Take 1 tablet (300 mg) by mouth daily    Viral hepatitis B chronic (H)

## 2018-04-05 NOTE — PROGRESS NOTES
Sent Ricky an email based on request per Shameka Sosa PA-C.    Beto Myles informed me you would like some resources for liver health and diabetes/carb control. I m attaching a document for food tracking apps. One of people s favorites is  my fitness pal .     I don t have any lower carb [place] meal plans, but typically what I know is that people eat rice, veggies, and meat. If you eat like this, I would recommend focusing on getting more meat and veggies, reducing your portion size of rice by half. If you have any questions or would like to talk further, please email me!    Tanna

## 2018-04-05 NOTE — LETTER
4/5/2018      RE: Ricky Pruitt  [id number] [facility] [address]  [facility] [address]       Hepatology Clinic note  Ricky Pruitt   Date of Birth 1980  Date of Service 4/5/2018         Assessment/plan:   Ricky Pruitt is a 37 year old male with chronic hepatitis B/ARAUJO on tenofovir for active disease. He has a history of elevated transaminases, biopsy showing inflammation more likely due to steatohepatitis. Patient also has a history of noncompliance with his antiviral medication, but his compliance has improved over the past 6 months and he now has low levels of viremia. His most recent HBV DNA level was 325. History of elevated blood glucose levels, no formal diagnosis of diabetes. We discussed his plan for exercise and he was also counseled for weight loss by emphasizing decreasing amount of simple carbohydrates and increasing vegetables and protein.     - Dietitian to send him resources for healthy nutrition and will set up formal referral if desired    - Check HbA1c and follow-up with primary care provider   - Continue daily tenofovir   - Hepatic panel in one month  - Follow-up in clinic in one month     Shameka Sosa PA-C   [facility] Hepatology clinic    Total time involved with patient was 30 minutes with >50% of time involved with counseling and coordination of care as noted above.     -----------------------------------------------------       HPI:   Ricky Pruitt is a 37 year old ong male with chronic Hepatitis B and ARAUJO presenting for the follow-up.    Hepatitis B:   E antigen: 8/22/2015  E antibody: 8/22/2015  -Diagnosed 2011   -Prior biopsy: 10/15/2015, see below  -Prior treatments: on tenofovir     ARAUJO:  - Biopsy, 10/15/2015: showing steatohepatitis with mild non-bridging pericellular fibrosis and mildly active hepatitis     Patient was last seen one month ago. Patient reports not being able to exercise as desired with his recent job change. He has made a formal plan to sign up for some  "personal training and start an exercise regimen. He states he has made changes in his diet, but he still consumes a fair amount of fruit and carbohydrates. He is wondering about alternatives. He has been taking his antiviral mediation every day and understands the importance of taking it every day. He has no problems with refills. He also has questions about his heart health. He has a significant family history for CAD.     Patient denies jaundice, lower extremity edema, abdominal distension or confusion.  Patient also denies melena, hematochezia or hematemesis. Patient denies weight loss, fevers, sweats or chills.    He lives with his wife and children. His wife is due to have another child in early June, she has gestational diabetes.       Medical hx Surgical hx   Past Medical History:   Diagnosis Date     Diagnostic skin and sensitization tests (aka ALLERGENS) 5/13/15 skin tests pos. for:  cat/dog/DM/M/T/G/W     Hepatitis B      Seasonal allergic rhinitis     Past Surgical History:   Procedure Laterality Date     APPENDECTOMY  2009                 Medications:     Current Outpatient Prescriptions   Medication     tenofovir (VIREAD) 300 MG tablet     penicillin V potassium (VEETID) 500 MG tablet     No current facility-administered medications for this visit.             Allergies:   No Known Allergies         Review of Systems:   10 points ROS was obtained and highlighted in the HPI, otherwise negative.          Physical Exam:   VS:  /80  Pulse 83  Temp 98.4  F (36.9  C) (Oral)  Ht 1.753 m (5' 9\")  Wt 103.7 kg (228 lb 9.6 oz)  SpO2 95%  BMI 33.76 kg/m2      Gen- well, NAD, A+Ox3, normal color, obese  Lym- no palpable LAD  CVS- RRR  RS- CTA  Extr- hands normal, no LUIS EDUARDO  Skin- no rash or jaundice  Neuro- grossly intact  Psych- normal mood         Data:   Reviewed in person and significant for:    Lab Results   Component Value Date     03/24/2018      Lab Results   Component Value Date    POTASSIUM " 3.7 03/24/2018     Lab Results   Component Value Date    CHLORIDE 103 03/24/2018     Lab Results   Component Value Date    CO2 25 03/24/2018     Lab Results   Component Value Date    BUN 16 03/24/2018     Lab Results   Component Value Date    CR 0.85 03/24/2018       Lab Results   Component Value Date    WBC 10.2 03/24/2018     Lab Results   Component Value Date    HGB 16.0 03/24/2018     Lab Results   Component Value Date    HCT 46.2 03/24/2018     Lab Results   Component Value Date    MCV 86 03/24/2018     Lab Results   Component Value Date     03/24/2018       Lab Results   Component Value Date     03/24/2018     Lab Results   Component Value Date     03/24/2018     Lab Results   Component Value Date    BILICONJ 0.0 05/06/2014      Lab Results   Component Value Date    BILITOTAL 0.8 03/24/2018       Lab Results   Component Value Date    ALBUMIN 3.6 03/24/2018     Lab Results   Component Value Date    PROTTOTAL 8.6 03/24/2018      Lab Results   Component Value Date    ALKPHOS 155 03/24/2018       Lab Results   Component Value Date    INR 1.06 03/24/2018             Shameka Sosa PA-C

## 2018-04-05 NOTE — PROGRESS NOTES
Hepatology Clinic note  Ricky Pruitt   Date of Birth 1980  Date of Service 4/5/2018         Assessment/plan:   Ricky Pruitt is a 37 year old male with chronic hepatitis B/ARAUJO on tenofovir for active disease. He has a history of elevated transaminases, biopsy showing inflammation more likely due to steatohepatitis. Patient also has a history of noncompliance with his antiviral medication, but his compliance has improved over the past 6 months and he now has low levels of viremia. His most recent HBV DNA level was 325. History of elevated blood glucose levels, no formal diagnosis of diabetes. We discussed his plan for exercise and he was also counseled for weight loss by emphasizing decreasing amount of simple carbohydrates and increasing vegetables and protein.     - Dietitian to send him resources for healthy nutrition and will set up formal referral if desired    - Check HbA1c and follow-up with primary care provider   - Continue daily tenofovir   - Hepatic panel in one month  - Follow-up in clinic in one month     Shameka Sosa PA-C   [facility] Hepatology clinic    Total time involved with patient was 30 minutes with >50% of time involved with counseling and coordination of care as noted above.     -----------------------------------------------------       HPI:   Ricky Pruitt is a 37 year old [facility] male with chronic Hepatitis B and ARAUJO presenting for the follow-up.    Hepatitis B:   E antigen: 8/22/2015  E antibody: 8/22/2015  -Diagnosed 2011   -Prior biopsy: 10/15/2015, see below  -Prior treatments: on tenofovir     ARAUJO:  - Biopsy, 10/15/2015: showing steatohepatitis with mild non-bridging pericellular fibrosis and mildly active hepatitis     Patient was last seen one month ago. Patient reports not being able to exercise as desired with his recent job change. He has made a formal plan to sign up for some personal training and start an exercise regimen. He states he has made changes in his  "diet, but he still consumes a fair amount of fruit and carbohydrates. He is wondering about alternatives. He has been taking his antiviral mediation every day and understands the importance of taking it every day. He has no problems with refills. He also has questions about his heart health. He has a significant family history for CAD.     Patient denies jaundice, lower extremity edema, abdominal distension or confusion.  Patient also denies melena, hematochezia or hematemesis. Patient denies weight loss, fevers, sweats or chills.    He lives with his wife and children. His wife is due to have another child in early June, she has gestational diabetes.       Medical hx Surgical hx   Past Medical History:   Diagnosis Date     Diagnostic skin and sensitization tests (aka ALLERGENS) 5/13/15 skin tests pos. for:  cat/dog/DM/M/T/G/W     Hepatitis B      Seasonal allergic rhinitis     Past Surgical History:   Procedure Laterality Date     APPENDECTOMY  2009                 Medications:     Current Outpatient Prescriptions   Medication     tenofovir (VIREAD) 300 MG tablet     penicillin V potassium (VEETID) 500 MG tablet     No current facility-administered medications for this visit.             Allergies:   No Known Allergies         Review of Systems:   10 points ROS was obtained and highlighted in the HPI, otherwise negative.          Physical Exam:   VS:  /80  Pulse 83  Temp 98.4  F (36.9  C) (Oral)  Ht 1.753 m (5' 9\")  Wt 103.7 kg (228 lb 9.6 oz)  SpO2 95%  BMI 33.76 kg/m2      Gen- well, NAD, A+Ox3, normal color, obese  Lym- no palpable LAD  CVS- RRR  RS- CTA  Extr- hands normal, no LUIS EDUARDO  Skin- no rash or jaundice  Neuro- grossly intact  Psych- normal mood         Data:   Reviewed in person and significant for:    Lab Results   Component Value Date     03/24/2018      Lab Results   Component Value Date    POTASSIUM 3.7 03/24/2018     Lab Results   Component Value Date    CHLORIDE 103 03/24/2018 "     Lab Results   Component Value Date    CO2 25 03/24/2018     Lab Results   Component Value Date    BUN 16 03/24/2018     Lab Results   Component Value Date    CR 0.85 03/24/2018       Lab Results   Component Value Date    WBC 10.2 03/24/2018     Lab Results   Component Value Date    HGB 16.0 03/24/2018     Lab Results   Component Value Date    HCT 46.2 03/24/2018     Lab Results   Component Value Date    MCV 86 03/24/2018     Lab Results   Component Value Date     03/24/2018       Lab Results   Component Value Date     03/24/2018     Lab Results   Component Value Date     03/24/2018     Lab Results   Component Value Date    BILICONJ 0.0 05/06/2014      Lab Results   Component Value Date    BILITOTAL 0.8 03/24/2018       Lab Results   Component Value Date    ALBUMIN 3.6 03/24/2018     Lab Results   Component Value Date    PROTTOTAL 8.6 03/24/2018      Lab Results   Component Value Date    ALKPHOS 155 03/24/2018       Lab Results   Component Value Date    INR 1.06 03/24/2018

## 2018-04-05 NOTE — NURSING NOTE
"Chief Complaint   Patient presents with     RECHECK     Hep B        Initial /80  Pulse 83  Temp 98.4  F (36.9  C) (Oral)  Ht 1.753 m (5' 9\")  Wt 103.7 kg (228 lb 9.6 oz)  SpO2 95%  BMI 33.76 kg/m2 Estimated body mass index is 33.76 kg/(m^2) as calculated from the following:    Height as of this encounter: 1.753 m (5' 9\").    Weight as of this encounter: 103.7 kg (228 lb 9.6 oz).  Medication Reconciliation: complete   Rsoalio Antony, CMA      "

## 2018-12-01 ENCOUNTER — OFFICE VISIT (OUTPATIENT)
Dept: URGENT CARE | Facility: URGENT CARE | Age: 38
End: 2018-12-01
Payer: COMMERCIAL

## 2018-12-01 VITALS
SYSTOLIC BLOOD PRESSURE: 119 MMHG | HEART RATE: 88 BPM | BODY MASS INDEX: 32.93 KG/M2 | TEMPERATURE: 97.1 F | WEIGHT: 223 LBS | OXYGEN SATURATION: 98 % | DIASTOLIC BLOOD PRESSURE: 77 MMHG

## 2018-12-01 DIAGNOSIS — M70.22 OLECRANON BURSITIS OF LEFT ELBOW: Primary | ICD-10-CM

## 2018-12-01 PROCEDURE — 99214 OFFICE O/P EST MOD 30 MIN: CPT | Performed by: PHYSICIAN ASSISTANT

## 2018-12-01 NOTE — MR AVS SNAPSHOT
After Visit Summary   12/1/2018    Ricky Pruitt    MRN: [id number]           Patient Information     Date Of Birth          1980        Visit Information        Provider Department      12/1/2018 10:30 AM Amanda Landers PA-C [facility]        Today's Diagnoses     Olecranon bursitis of left elbow    -  1      Care Instructions      Bursitis of the Elbow (Olecranon)  Your elbow joint contains a small fluid-filled sac called a bursa. The bursa helps the muscles and tendons move smoothly over the bone. It also cushions and protects your elbow. Bursitis is when the bursa is inflamed or swollen. This is most often due to overuse of or injury to the elbow. Symptoms include swelling and pain. If the elbow is red and feels warm to the touch, the bursa itself may be infected.  In most cases, elbow bursitis resolves with medicine and self-care at home. It may take several weeks for the bursa to heal and the swelling to go away. In some cases, your healthcare provider may drain excess fluid from the bursa. Or, he or she may inject medicine directly into the bursa to help relieve symptoms. In severe cases, you may need surgery to remove the bursa may. If there is concern that the bursa is infected, your healthcare provider may prescribe antibiotics to treat the infection.    Home care  Your healthcare provider may prescribe medicine to help relieve pain and swelling. This may be an over-the-counter pain reliever or prescription pain medicine. Take all medicines as directed. To help treat or prevent infection, your provider may prescribe antibiotics. If these are prescribed, take them as directed until they are gone.  The following are general care guidelines:    Apply an ice pack or bag of frozen peas wrapped in a thin towel to your elbow for 15 to 20 minutes at a time. Do this 3 to 4 times a day until pain and swelling improve.    Keep your elbow raised above the level of  your heart whenever possible. This helps reduce swelling. When sitting or lying down, place your arm on a pillow that rests on your chest or on a pillow at your side.    Use an elastic wrap around the elbow joint to compress the area while it is healing. Make the wrap snug but not tight to the point of causing pain.    Rest your elbow to give it time to heal. You may need to wear an elbow pad to help protect and limit the movement of your elbow. During and after healing, avoid leaning on your elbows.  Follow-up care  Follow up with your healthcare provider, or as advised. If you have been referred to a specialist, make that appointment promptly.  When to seek medical advice  Call your healthcare provider right away if any of these occur:    Fever of 100.4 F (38 C) or higher, or as advised    Chills    Increased pain, swelling, warmth, redness, or drainage from the joint    Trouble moving the elbow joint    Numbness or tingling in the hand    Severe pain or swelling in forearm or hand    Loss of pink color and slow return of color after squeezing fingertip or hand  Date Last Reviewed: 6/1/2016 2000-2018 The [employer]. [address] [place] [address]. All rights reserved. This information is not intended as a substitute for professional medical care. Always follow your healthcare professional's instructions.        Bursitis of the Elbow (Olecranon)  Your elbow joint contains a small fluid-filled sac called a bursa. The bursa helps the muscles and tendons move smoothly over the bone. It also cushions and protects your elbow. Bursitis is when the bursa is inflamed or swollen. This is most often due to overuse of or injury to the elbow. Symptoms include swelling and pain. If the elbow is red and feels warm to the touch, the bursa itself may be infected.  In most cases, elbow bursitis resolves with medicine and self-care at home. It may take several weeks for the bursa to heal and the swelling to  go away. In some cases, your healthcare provider may drain excess fluid from the bursa. Or, he or she may inject medicine directly into the bursa to help relieve symptoms. In severe cases, you may need surgery to remove the bursa may. If there is concern that the bursa is infected, your healthcare provider may prescribe antibiotics to treat the infection.    Home care  Your healthcare provider may prescribe medicine to help relieve pain and swelling. This may be an over-the-counter pain reliever or prescription pain medicine. Take all medicines as directed. To help treat or prevent infection, your provider may prescribe antibiotics. If these are prescribed, take them as directed until they are gone.  The following are general care guidelines:    Apply an ice pack or bag of frozen peas wrapped in a thin towel to your elbow for 15 to 20 minutes at a time. Do this 3 to 4 times a day until pain and swelling improve.    Keep your elbow raised above the level of your heart whenever possible. This helps reduce swelling. When sitting or lying down, place your arm on a pillow that rests on your chest or on a pillow at your side.    Use an elastic wrap around the elbow joint to compress the area while it is healing. Make the wrap snug but not tight to the point of causing pain.    Rest your elbow to give it time to heal. You may need to wear an elbow pad to help protect and limit the movement of your elbow. During and after healing, avoid leaning on your elbows.  Follow-up care  Follow up with your healthcare provider, or as advised. If you have been referred to a specialist, make that appointment promptly.  When to seek medical advice  Call your healthcare provider right away if any of these occur:    Fever of 100.4 F (38 C) or higher, or as advised    Chills    Increased pain, swelling, warmth, redness, or drainage from the joint    Trouble moving the elbow joint    Numbness or tingling in the hand    Severe pain or  swelling in forearm or hand    Loss of pink color and slow return of color after squeezing fingertip or hand  Date Last Reviewed: 6/1/2016 2000-2018 [employer]. [address] [place] [address], [place] [address]. All rights reserved. This information is not intended as a substitute for professional medical care. Always follow your healthcare professional's instructions.                Follow-ups after your visit        Additional Services     ORTHOPEDICS ADULT REFERRAL       Your provider has referred you to: [facility] [place] [phone] [email or web address]    Please be aware that coverage of these services is subject to the terms and limitations of your health insurance plan.  Call member services at your health plan with any benefit or coverage questions.      Please bring the following to your appointment:    >>   Any x-rays, CTs or MRIs which have been performed.  Contact the facility where they were done to arrange for  prior to your scheduled appointment.    >>   List of current medications   >>   This referral request   >>   Any documents/labs given to you for this referral                  Who to contact     If you have questions or need follow up information about today's clinic visit or your schedule please contact [facility] directly at [phone].  Normal or non-critical lab and imaging results will be communicated to you by MyChart, letter or phone within 4 business days after the clinic has received the results. If you do not hear from us within 7 days, please contact the clinic through MyChart or phone. If you have a critical or abnormal lab result, we will notify you by phone as soon as possible.  Submit refill requests through [employer] or call your pharmacy and they will forward the refill request to us. Please allow 3 business days for your refill to be completed.          Additional Information About Your  Visit        MyChart Information     MyTime gives you secure access to your electronic health record. If you see a primary care provider, you can also send messages to your care team and make appointments. If you have questions, please call your primary care clinic.  If you do not have a primary care provider, please call [phone] and they will assist you.        Care EveryWhere ID     This is your Care EveryWhere ID. This could be used by other organizations to access your [facility] medical records  [id number]        Your Vitals Were     Pulse Temperature Pulse Oximetry BMI (Body Mass Index)          88 97.1  F (36.2  C) (Tympanic) 98% 32.93 kg/m2         Blood Pressure from Last 3 Encounters:   12/01/18 119/77   04/05/18 116/80   02/14/18 127/83    Weight from Last 3 Encounters:   12/01/18 223 lb (101.2 kg)   04/05/18 228 lb 9.6 oz (103.7 kg)   02/14/18 230 lb (104.3 kg)              We Performed the Following     ORTHOPEDICS ADULT REFERRAL          Today's Medication Changes          These changes are accurate as of 12/1/18 11:49 AM.  If you have any questions, ask your nurse or doctor.               Start taking these medicines.        Dose/Directions    amoxicillin-clavulanate 875-125 MG tablet   Commonly known as:  AUGMENTIN   Used for:  Olecranon bursitis of left elbow   Started by:  Amanda Landers PA-C        Dose:  1 tablet   Take 1 tablet by mouth 2 times daily for 10 days   Quantity:  20 tablet   Refills:  0            Where to get your medicines      These medications were sent to [facility] [address] [address]     Phone:  [phone]     amoxicillin-clavulanate 875-125 MG tablet                Primary Care Provider Office Phone # Fax #    R Sin Knight [phone] [address] [facility] [address]        Equal Access to Services     COURTNEY JORGENSEN AH: Jose Trinh,  wamatheusroney romeo, qaybta [place], miles maryin hayaan staskassi mauida laRosarioaaviktoria ah. So [facility] [phone].    ATENCIÓN: Si erick huertas, tiene a storm disposición servicios gratuitos de asistencia lingüística. Jakob al [phone].    We comply with applicable federal civil rights laws and [place] laws. We do not discriminate on the basis of race, color, national origin, age, disability, sex, sexual orientation, or gender identity.            Thank you!     Thank you for choosing [facility]  for your care. Our goal is always to provide you with excellent care. Hearing back from our patients is one way we can continue to improve our services. Please take a few minutes to complete the written survey that you may receive in the mail after your visit with us. Thank you!             Your Updated Medication List - Protect others around you: Learn how to safely use, store and throw away your medicines at www.disposemymeds.org.          This list is accurate as of 12/1/18 11:49 AM.  Always use your most recent med list.                   Brand Name Dispense Instructions for use Diagnosis    amoxicillin-clavulanate 875-125 MG tablet    AUGMENTIN    20 tablet    Take 1 tablet by mouth 2 times daily for 10 days    Olecranon bursitis of left elbow       penicillin V 500 MG tablet    VEETID    20 tablet    Take 1 tablet (500 mg) by mouth 2 times daily    Strep throat       tenofovir 300 MG tablet    VIREAD    90 tablet    Take 1 tablet (300 mg) by mouth daily    Viral hepatitis B chronic (H)

## 2018-12-01 NOTE — PROGRESS NOTES
SUBJECTIVE:   Ricky Pruitt is a 38 year old male presenting with a chief complaint of   Chief Complaint   Patient presents with     Elbow Pain     left elbow, redness, warm to the touch, gotten worse, started thursday, wednesday night had a fever, soreness, slight swelling        He is an established patient of [facility].    MS Injury/Pain    Onset of symptoms was 3 day(s) ago.  Location: left elbow  Context:       The injury happened while none      Mechanism: none      Patient experienced delayed pain  Course of symptoms is worsening.    Severity moderate  Current and Associated symptoms: Pain, Swelling, Warmth and Redness  Denies  Bruising  Aggravating Factors: movement, repetitive motion and flexion/extension  Therapies to improve symptoms include: ibuprofen  This is the first time this type of problem has occurred for this patient.     Review of Systems   Constitutional: Negative for chills, fatigue, fever and unexpected weight change.   HENT: Negative for congestion, ear pain, rhinorrhea, sinus pain, sinus pressure and sore throat.    Eyes: Negative for pain, discharge, redness and itching.   Respiratory: Negative for cough, shortness of breath and wheezing.    Cardiovascular: Negative for chest pain, palpitations and leg swelling.   Gastrointestinal: Negative for abdominal pain, constipation, diarrhea, nausea and vomiting.   Musculoskeletal: Negative for arthralgias and myalgias.        Left elbow pain   Skin: Negative for rash.       Past Medical History:   Diagnosis Date     Diagnostic skin and sensitization tests (aka ALLERGENS) 5/13/15 skin tests pos. for:  cat/dog/DM/M/T/G/W     Hepatitis B      Seasonal allergic rhinitis     5/13/15 skin tests pos. for:  cat/dog/DM/M/T/G/W     Family History   Problem Relation Age of Onset     Liver Disease Mother      Hepatitis B     Eye Disorder Father      Liver Disease Sister      Hepatitis B     Eye Disorder Sister      Current Outpatient Prescriptions   Medication  Sig Dispense Refill     amoxicillin-clavulanate (AUGMENTIN) 875-125 MG tablet Take 1 tablet by mouth 2 times daily for 10 days 20 tablet 0     tenofovir (VIREAD) 300 MG tablet Take 1 tablet (300 mg) by mouth daily 90 tablet 3     penicillin V potassium (VEETID) 500 MG tablet Take 1 tablet (500 mg) by mouth 2 times daily (Patient not taking: Reported on 4/5/2018) 20 tablet 0     Social History   Substance Use Topics     Smoking status: Never Smoker     Smokeless tobacco: Never Used     Alcohol use No       OBJECTIVE  /77  Pulse 88  Temp 97.1  F (36.2  C) (Tympanic)  Wt 223 lb (101.2 kg)  SpO2 98%  BMI 32.93 kg/m2    Physical Exam   Constitutional: He appears well-developed and well-nourished. No distress.   Musculoskeletal:   Left elbow has swelling, redness, warmth, and tenderness with palpation. No abrasions or breaks in the skin.    Psychiatric: He has a normal mood and affect. His speech is normal and behavior is normal.       Labs:  No results found for this or any previous visit (from the past 24 hour(s)).    X-Ray was not done.    ASSESSMENT:      ICD-10-CM    1. Olecranon bursitis of left elbow M70.22 amoxicillin-clavulanate (AUGMENTIN) 875-125 MG tablet     ORTHOPEDICS ADULT REFERRAL        Medical Decision Making:    Differential Diagnosis:  MS Injury Pain: olecranon bursitis, sprain, tendonitis, muscle strain, contusion and gout    Serious Comorbid Conditions:  Adult:  None    PLAN:    MS Injury/Pain  ice, rest, Tylenol, Ibuprofen and Rx: Augmentin x 10 days. Follow up with orthopedics this week. Discussed in detail symptoms that would warrant emergent evaluation in the ED. Patient agrees with plan and will follow up as needed.      Followup:    Follow-up with orthopedics this week, If not improving or if conditions worsens over the next 12-24 hours, go to the Emergency Department    Greater than 50% of the 25 minute visits was spent face to face on counseling and coordination of  care.      Patient Instructions     Bursitis of the Elbow (Olecranon)  Your elbow joint contains a small fluid-filled sac called a bursa. The bursa helps the muscles and tendons move smoothly over the bone. It also cushions and protects your elbow. Bursitis is when the bursa is inflamed or swollen. This is most often due to overuse of or injury to the elbow. Symptoms include swelling and pain. If the elbow is red and feels warm to the touch, the bursa itself may be infected.  In most cases, elbow bursitis resolves with medicine and self-care at home. It may take several weeks for the bursa to heal and the swelling to go away. In some cases, your healthcare provider may drain excess fluid from the bursa. Or, he or she may inject medicine directly into the bursa to help relieve symptoms. In severe cases, you may need surgery to remove the bursa may. If there is concern that the bursa is infected, your healthcare provider may prescribe antibiotics to treat the infection.    Home care  Your healthcare provider may prescribe medicine to help relieve pain and swelling. This may be an over-the-counter pain reliever or prescription pain medicine. Take all medicines as directed. To help treat or prevent infection, your provider may prescribe antibiotics. If these are prescribed, take them as directed until they are gone.  The following are general care guidelines:    Apply an ice pack or bag of frozen peas wrapped in a thin towel to your elbow for 15 to 20 minutes at a time. Do this 3 to 4 times a day until pain and swelling improve.    Keep your elbow raised above the level of your heart whenever possible. This helps reduce swelling. When sitting or lying down, place your arm on a pillow that rests on your chest or on a pillow at your side.    Use an elastic wrap around the elbow joint to compress the area while it is healing. Make the wrap snug but not tight to the point of causing pain.    Rest your elbow to give it time  to heal. You may need to wear an elbow pad to help protect and limit the movement of your elbow. During and after healing, avoid leaning on your elbows.  Follow-up care  Follow up with your healthcare provider, or as advised. If you have been referred to a specialist, make that appointment promptly.  When to seek medical advice  Call your healthcare provider right away if any of these occur:    Fever of 100.4 F (38 C) or higher, or as advised    Chills    Increased pain, swelling, warmth, redness, or drainage from the joint    Trouble moving the elbow joint    Numbness or tingling in the hand    Severe pain or swelling in forearm or hand    Loss of pink color and slow return of color after squeezing fingertip or hand  Date Last Reviewed: 6/1/2016 2000-2018 [employer]. [address] [place] [address]. All rights reserved. This information is not intended as a substitute for professional medical care. Always follow your healthcare professional's instructions.        Bursitis of the Elbow (Olecranon)  Your elbow joint contains a small fluid-filled sac called a bursa. The bursa helps the muscles and tendons move smoothly over the bone. It also cushions and protects your elbow. Bursitis is when the bursa is inflamed or swollen. This is most often due to overuse of or injury to the elbow. Symptoms include swelling and pain. If the elbow is red and feels warm to the touch, the bursa itself may be infected.  In most cases, elbow bursitis resolves with medicine and self-care at home. It may take several weeks for the bursa to heal and the swelling to go away. In some cases, your healthcare provider may drain excess fluid from the bursa. Or, he or she may inject medicine directly into the bursa to help relieve symptoms. In severe cases, you may need surgery to remove the bursa may. If there is concern that the bursa is infected, your healthcare provider may prescribe antibiotics to treat the  infection.    Home care  Your healthcare provider may prescribe medicine to help relieve pain and swelling. This may be an over-the-counter pain reliever or prescription pain medicine. Take all medicines as directed. To help treat or prevent infection, your provider may prescribe antibiotics. If these are prescribed, take them as directed until they are gone.  The following are general care guidelines:    Apply an ice pack or bag of frozen peas wrapped in a thin towel to your elbow for 15 to 20 minutes at a time. Do this 3 to 4 times a day until pain and swelling improve.    Keep your elbow raised above the level of your heart whenever possible. This helps reduce swelling. When sitting or lying down, place your arm on a pillow that rests on your chest or on a pillow at your side.    Use an elastic wrap around the elbow joint to compress the area while it is healing. Make the wrap snug but not tight to the point of causing pain.    Rest your elbow to give it time to heal. You may need to wear an elbow pad to help protect and limit the movement of your elbow. During and after healing, avoid leaning on your elbows.  Follow-up care  Follow up with your healthcare provider, or as advised. If you have been referred to a specialist, make that appointment promptly.  When to seek medical advice  Call your healthcare provider right away if any of these occur:    Fever of 100.4 F (38 C) or higher, or as advised    Chills    Increased pain, swelling, warmth, redness, or drainage from the joint    Trouble moving the elbow joint    Numbness or tingling in the hand    Severe pain or swelling in forearm or hand    Loss of pink color and slow return of color after squeezing fingertip or hand  Date Last Reviewed: 6/1/2016 2000-2018 [employer]. [address] [place] [address], [place] [address]. All rights reserved. This information is not intended as a substitute for professional medical care. Always follow your healthcare  professional's instructions.

## 2018-12-01 NOTE — PATIENT INSTRUCTIONS
Bursitis of the Elbow (Olecranon)  Your elbow joint contains a small fluid-filled sac called a bursa. The bursa helps the muscles and tendons move smoothly over the bone. It also cushions and protects your elbow. Bursitis is when the bursa is inflamed or swollen. This is most often due to overuse of or injury to the elbow. Symptoms include swelling and pain. If the elbow is red and feels warm to the touch, the bursa itself may be infected.  In most cases, elbow bursitis resolves with medicine and self-care at home. It may take several weeks for the bursa to heal and the swelling to go away. In some cases, your healthcare provider may drain excess fluid from the bursa. Or, he or she may inject medicine directly into the bursa to help relieve symptoms. In severe cases, you may need surgery to remove the bursa may. If there is concern that the bursa is infected, your healthcare provider may prescribe antibiotics to treat the infection.    Home care  Your healthcare provider may prescribe medicine to help relieve pain and swelling. This may be an over-the-counter pain reliever or prescription pain medicine. Take all medicines as directed. To help treat or prevent infection, your provider may prescribe antibiotics. If these are prescribed, take them as directed until they are gone.  The following are general care guidelines:    Apply an ice pack or bag of frozen peas wrapped in a thin towel to your elbow for 15 to 20 minutes at a time. Do this 3 to 4 times a day until pain and swelling improve.    Keep your elbow raised above the level of your heart whenever possible. This helps reduce swelling. When sitting or lying down, place your arm on a pillow that rests on your chest or on a pillow at your side.    Use an elastic wrap around the elbow joint to compress the area while it is healing. Make the wrap snug but not tight to the point of causing pain.    Rest your elbow to give it time to heal. You may need to wear an  elbow pad to help protect and limit the movement of your elbow. During and after healing, avoid leaning on your elbows.  Follow-up care  Follow up with your healthcare provider, or as advised. If you have been referred to a specialist, make that appointment promptly.  When to seek medical advice  Call your healthcare provider right away if any of these occur:    Fever of 100.4 F (38 C) or higher, or as advised    Chills    Increased pain, swelling, warmth, redness, or drainage from the joint    Trouble moving the elbow joint    Numbness or tingling in the hand    Severe pain or swelling in forearm or hand    Loss of pink color and slow return of color after squeezing fingertip or hand  Date Last Reviewed: 6/1/2016 2000-2018 [employer]. [address] [place] [address]. All rights reserved. This information is not intended as a substitute for professional medical care. Always follow your healthcare professional's instructions.        Bursitis of the Elbow (Olecranon)  Your elbow joint contains a small fluid-filled sac called a bursa. The bursa helps the muscles and tendons move smoothly over the bone. It also cushions and protects your elbow. Bursitis is when the bursa is inflamed or swollen. This is most often due to overuse of or injury to the elbow. Symptoms include swelling and pain. If the elbow is red and feels warm to the touch, the bursa itself may be infected.  In most cases, elbow bursitis resolves with medicine and self-care at home. It may take several weeks for the bursa to heal and the swelling to go away. In some cases, your healthcare provider may drain excess fluid from the bursa. Or, he or she may inject medicine directly into the bursa to help relieve symptoms. In severe cases, you may need surgery to remove the bursa may. If there is concern that the bursa is infected, your healthcare provider may prescribe antibiotics to treat the infection.    Home care  Your  healthcare provider may prescribe medicine to help relieve pain and swelling. This may be an over-the-counter pain reliever or prescription pain medicine. Take all medicines as directed. To help treat or prevent infection, your provider may prescribe antibiotics. If these are prescribed, take them as directed until they are gone.  The following are general care guidelines:    Apply an ice pack or bag of frozen peas wrapped in a thin towel to your elbow for 15 to 20 minutes at a time. Do this 3 to 4 times a day until pain and swelling improve.    Keep your elbow raised above the level of your heart whenever possible. This helps reduce swelling. When sitting or lying down, place your arm on a pillow that rests on your chest or on a pillow at your side.    Use an elastic wrap around the elbow joint to compress the area while it is healing. Make the wrap snug but not tight to the point of causing pain.    Rest your elbow to give it time to heal. You may need to wear an elbow pad to help protect and limit the movement of your elbow. During and after healing, avoid leaning on your elbows.  Follow-up care  Follow up with your healthcare provider, or as advised. If you have been referred to a specialist, make that appointment promptly.  When to seek medical advice  Call your healthcare provider right away if any of these occur:    Fever of 100.4 F (38 C) or higher, or as advised    Chills    Increased pain, swelling, warmth, redness, or drainage from the joint    Trouble moving the elbow joint    Numbness or tingling in the hand    Severe pain or swelling in forearm or hand    Loss of pink color and slow return of color after squeezing fingertip or hand  Date Last Reviewed: 6/1/2016 2000-2018 [employer]. [address] [place] [address]. All rights reserved. This information is not intended as a substitute for professional medical care. Always follow your healthcare professional's instructions.

## 2018-12-04 ASSESSMENT — ENCOUNTER SYMPTOMS
WHEEZING: 0
SORE THROAT: 0
COUGH: 0
MYALGIAS: 0
UNEXPECTED WEIGHT CHANGE: 0
CONSTIPATION: 0
EYE DISCHARGE: 0
CHILLS: 0
VOMITING: 0
EYE ITCHING: 0
FATIGUE: 0
EYE REDNESS: 0
ARTHRALGIAS: 0
NAUSEA: 0
SINUS PAIN: 0
RHINORRHEA: 0
PALPITATIONS: 0
FEVER: 0
ABDOMINAL PAIN: 0
DIARRHEA: 0
EYE PAIN: 0
SHORTNESS OF BREATH: 0
SINUS PRESSURE: 0

## 2019-01-04 ENCOUNTER — OFFICE VISIT (OUTPATIENT)
Dept: FAMILY MEDICINE | Facility: CLINIC | Age: 39
End: 2019-01-04
Payer: COMMERCIAL

## 2019-01-04 VITALS
BODY MASS INDEX: 33.62 KG/M2 | SYSTOLIC BLOOD PRESSURE: 106 MMHG | DIASTOLIC BLOOD PRESSURE: 70 MMHG | HEIGHT: 69 IN | WEIGHT: 227 LBS | OXYGEN SATURATION: 97 % | TEMPERATURE: 97.8 F | HEART RATE: 88 BPM

## 2019-01-04 DIAGNOSIS — M25.562 ACUTE PAIN OF LEFT KNEE: Primary | ICD-10-CM

## 2019-01-04 PROCEDURE — 99213 OFFICE O/P EST LOW 20 MIN: CPT | Performed by: NURSE PRACTITIONER

## 2019-01-04 RX ORDER — NAPROXEN 500 MG/1
TABLET ORAL
Qty: 40 TABLET | Refills: 0 | Status: SHIPPED | OUTPATIENT
Start: 2019-01-04 | End: 2019-02-04

## 2019-01-04 ASSESSMENT — ENCOUNTER SYMPTOMS
NAUSEA: 0
FEVER: 0
SINUS PRESSURE: 0
COUGH: 0
RHINORRHEA: 0
EYE DISCHARGE: 0
VOMITING: 0
WHEEZING: 0
ARTHRALGIAS: 1
SORE THROAT: 0
SHORTNESS OF BREATH: 0
DIAPHORESIS: 0
DIARRHEA: 0
JOINT SWELLING: 0
HEADACHES: 0
FATIGUE: 0

## 2019-01-04 ASSESSMENT — PAIN SCALES - GENERAL: PAINLEVEL: SEVERE PAIN (6)

## 2019-01-04 ASSESSMENT — MIFFLIN-ST. JEOR: SCORE: 1945.05

## 2019-01-04 NOTE — PROGRESS NOTES
"  SUBJECTIVE:   Ricky Pruitt is a 38 year old male who presents to clinic today for the following health issues:      Chief Complaint   Patient presents with     Musculoskeletal Problem     left knee pain x1 day. no known injury          Problem list and histories reviewed & adjusted, as indicated.  Additional history    Current Outpatient Medications   Medication Sig Dispense Refill     naproxen (NAPROSYN) 500 MG tablet Take 1 pill twice per day with food for 1 week then every 12 hours as needed 40 tablet 0     tenofovir (VIREAD) 300 MG tablet Take 1 tablet (300 mg) by mouth daily 90 tablet 3     No Known Allergies    Reviewed and updated as needed this visit by clinical staff  Tobacco  Allergies  Meds  Med Hx  Surg Hx  Fam Hx  Soc Hx      Reviewed and updated as needed this visit by Provider        Left knee has been hurting for the last 1-2 days. Left knee pain on inside of knee. If fully extends leg will have sharp pain. Has been limping. Hard to put full weight on it. No injury to knee that is aware of. Hurts more in back and side. Sharp pain. Has been trying to stretch it.  Recently drove to [place] and back.  It is a 14-hour trip each way.  Has 3 kids.  Did get out and walk every 2-3 hours.    ROS:  Review of Systems   Constitutional: Negative for diaphoresis, fatigue and fever.   HENT: Negative for congestion, ear pain, rhinorrhea, sinus pressure and sore throat.    Eyes: Negative for discharge.   Respiratory: Negative for cough, shortness of breath and wheezing.    Cardiovascular: Negative for chest pain.   Gastrointestinal: Negative for diarrhea, nausea and vomiting.   Musculoskeletal: Positive for arthralgias (left knee). Negative for joint swelling.   Neurological: Negative for headaches.       OBJECTIVE:     /70 (BP Location: Left arm, Cuff Size: Adult Regular)   Pulse 88   Temp 97.8  F (36.6  C) (Tympanic)   Ht 1.753 m (5' 9\")   Wt 103 kg (227 lb)   SpO2 97%   BMI 33.52 kg/m    Body " mass index is 33.52 kg/m .  Physical Exam   Constitutional: He appears well-developed and well-nourished.   Cardiovascular: Normal rate, regular rhythm and normal heart sounds.   Pulmonary/Chest: Effort normal and breath sounds normal.   Musculoskeletal:        Left knee: He exhibits decreased range of motion. He exhibits no swelling, no effusion, no ecchymosis, no erythema, no bony tenderness and normal meniscus. Tenderness found.        Legs:  Neurological: He is alert.   Skin: Skin is warm and dry.   No erythema, no swelling no warmth to touch.       ASSESSMENT/PLAN:   1. Acute pain of left knee  Educated on use of medication.  No over-the-counter aspirin, Advil, ibuprofen or Aleve.  May take Tylenol.  Apply ice.  Notify if no improvement over the next 7-10 days and will set up for physical therapy.  - naproxen (NAPROSYN) 500 MG tablet; Take 1 pill twice per day with food for 1 week then every 12 hours as needed  Dispense: 40 tablet; Refill: 0      MARKOS Mcgregor [facility]

## 2019-01-04 NOTE — PATIENT INSTRUCTIONS
Notify if no improvement and will get you set up for physical therapy.    Apply ice 3 times per day for 20 minutes.

## 2019-02-07 DIAGNOSIS — B18.1 VIRAL HEPATITIS B CHRONIC (H): ICD-10-CM

## 2019-02-08 RX ORDER — TENOFOVIR DISOPROXIL FUMARATE 300 MG/1
300 TABLET, FILM COATED ORAL DAILY
Qty: 90 TABLET | Refills: 3 | Status: SHIPPED | OUTPATIENT
Start: 2019-02-08 | End: 2020-02-26

## 2019-11-03 ENCOUNTER — HEALTH MAINTENANCE LETTER (OUTPATIENT)
Age: 39
End: 2019-11-03

## 2020-02-26 DIAGNOSIS — B18.1 VIRAL HEPATITIS B CHRONIC (H): ICD-10-CM

## 2020-02-26 DIAGNOSIS — B18.1 VIRAL HEPATITIS B CHRONIC (H): Primary | ICD-10-CM

## 2020-02-26 RX ORDER — TENOFOVIR DISOPROXIL FUMARATE 300 MG/1
300 TABLET, FILM COATED ORAL DAILY
Qty: 30 TABLET | Refills: 0 | Status: SHIPPED | OUTPATIENT
Start: 2020-02-26 | End: 2020-02-26

## 2020-02-26 RX ORDER — TENOFOVIR DISOPROXIL FUMARATE 300 MG/1
300 TABLET, FILM COATED ORAL DAILY
Qty: 30 TABLET | Refills: 0 | Status: SHIPPED | OUTPATIENT
Start: 2020-02-26 | End: 2021-01-28

## 2020-02-27 DIAGNOSIS — B18.1 VIRAL HEPATITIS B CHRONIC (H): Primary | ICD-10-CM

## 2020-02-29 DIAGNOSIS — B18.1 VIRAL HEPATITIS B CHRONIC (H): ICD-10-CM

## 2020-02-29 LAB
ALBUMIN SERPL-MCNC: 3.7 G/DL (ref 3.4–5)
ALP SERPL-CCNC: 169 U/L (ref 40–150)
ALT SERPL W P-5'-P-CCNC: 167 U/L (ref 0–70)
ANION GAP SERPL CALCULATED.3IONS-SCNC: 3 MMOL/L (ref 3–14)
AST SERPL W P-5'-P-CCNC: 99 U/L (ref 0–45)
BILIRUB DIRECT SERPL-MCNC: 0.3 MG/DL (ref 0–0.2)
BILIRUB SERPL-MCNC: 1.4 MG/DL (ref 0.2–1.3)
BUN SERPL-MCNC: 13 MG/DL (ref 7–30)
CALCIUM SERPL-MCNC: 9 MG/DL (ref 8.5–10.1)
CHLORIDE SERPL-SCNC: 100 MMOL/L (ref 94–109)
CO2 SERPL-SCNC: 31 MMOL/L (ref 20–32)
CREAT SERPL-MCNC: 0.81 MG/DL (ref 0.66–1.25)
ERYTHROCYTE [DISTWIDTH] IN BLOOD BY AUTOMATED COUNT: 12.5 % (ref 10–15)
GFR SERPL CREATININE-BSD FRML MDRD: >90 ML/MIN/{1.73_M2}
GLUCOSE SERPL-MCNC: 293 MG/DL (ref 70–99)
HCT VFR BLD AUTO: 47.3 % (ref 40–53)
HGB BLD-MCNC: 16.1 G/DL (ref 13.3–17.7)
INR PPP: 1.13 (ref 0.86–1.14)
MCH RBC QN AUTO: 28.9 PG (ref 26.5–33)
MCHC RBC AUTO-ENTMCNC: 34 G/DL (ref 31.5–36.5)
MCV RBC AUTO: 85 FL (ref 78–100)
PLATELET # BLD AUTO: 169 10E9/L (ref 150–450)
POTASSIUM SERPL-SCNC: 4.2 MMOL/L (ref 3.4–5.3)
PROT SERPL-MCNC: 8.8 G/DL (ref 6.8–8.8)
RBC # BLD AUTO: 5.57 10E12/L (ref 4.4–5.9)
SODIUM SERPL-SCNC: 134 MMOL/L (ref 133–144)
WBC # BLD AUTO: 6.6 10E9/L (ref 4–11)

## 2020-02-29 PROCEDURE — 85610 PROTHROMBIN TIME: CPT | Performed by: PHYSICIAN ASSISTANT

## 2020-02-29 PROCEDURE — 87517 HEPATITIS B DNA QUANT: CPT | Performed by: PHYSICIAN ASSISTANT

## 2020-02-29 PROCEDURE — 80076 HEPATIC FUNCTION PANEL: CPT | Performed by: PHYSICIAN ASSISTANT

## 2020-02-29 PROCEDURE — 85027 COMPLETE CBC AUTOMATED: CPT | Performed by: PHYSICIAN ASSISTANT

## 2020-02-29 PROCEDURE — 80048 BASIC METABOLIC PNL TOTAL CA: CPT | Performed by: PHYSICIAN ASSISTANT

## 2020-02-29 PROCEDURE — 36415 COLL VENOUS BLD VENIPUNCTURE: CPT | Performed by: PHYSICIAN ASSISTANT

## 2020-03-04 ENCOUNTER — OFFICE VISIT (OUTPATIENT)
Dept: GASTROENTEROLOGY | Facility: CLINIC | Age: 40
End: 2020-03-04
Attending: PHYSICIAN ASSISTANT
Payer: COMMERCIAL

## 2020-03-04 VITALS
DIASTOLIC BLOOD PRESSURE: 83 MMHG | HEIGHT: 69 IN | BODY MASS INDEX: 31.71 KG/M2 | SYSTOLIC BLOOD PRESSURE: 120 MMHG | OXYGEN SATURATION: 95 % | WEIGHT: 214.1 LBS | TEMPERATURE: 97.6 F | HEART RATE: 89 BPM

## 2020-03-04 DIAGNOSIS — B18.1 VIRAL HEPATITIS B CHRONIC (H): Primary | ICD-10-CM

## 2020-03-04 DIAGNOSIS — K76.0 NONALCOHOLIC HEPATOSTEATOSIS: ICD-10-CM

## 2020-03-04 DIAGNOSIS — B18.1 CHRONIC VIRAL HEPATITIS B WITHOUT DELTA AGENT AND WITHOUT COMA (H): ICD-10-CM

## 2020-03-04 LAB
HBV DNA SERPL NAA+PROBE-ACNC: 51 [IU]/ML
HBV DNA SERPL NAA+PROBE-LOG IU: 1.7 {LOG_IU}/ML

## 2020-03-04 ASSESSMENT — PAIN SCALES - GENERAL: PAINLEVEL: NO PAIN (0)

## 2020-03-04 ASSESSMENT — MIFFLIN-ST. JEOR: SCORE: 1881.53

## 2020-03-04 NOTE — LETTER
3/4/2020       RE: Ricky Pruitt  [id number]  [place] [address]     Dear Colleague,    Thank you for referring your patient, Ricky Pruitt, to the [facility]PAT[facility] at [facility]. Please see a copy of my visit note below.    Hepatology Clinic note  Ricky Pruitt   Date of Birth 1980  Date of Service 3/4/2020         Assessment/plan:   Ricky Pruitt is a 39 year old male with ARAUJO/Hepatitis B, who has been maintained on tenofovir DF for viral suppression. His HBV DNA remains low correlating medication compliance. His transaminases have remained elevated, Alk Phos 169, AST 99 and  which is due to fatty liver disease.  His platelets are high normal, otherwise normal liver function. No physical signs of advanced fibrosis. His glucose was very elevated today, 293 and discussed with a likely diagnosis of diabetes. He was told that he needs to follow up with his PCP to optimize his lipid panel and glucose. We discussed what a significant impact this can have on fatty liver.     - Follow up with PCP to evaluate his blood glucose and treat as needed  - Continue slow gradual weight loss   - Limit carbohydrates in the diet, especially simple carbohydrates  - Continue regular physical activity   - Continue tenofovir disoproxil   - BMP, Hepatic panel, CBC, INR and HBV DNA in six months  - Repeat fibrosis scan in one year    -Follow-up in clinic in one year or sooner as needed     Shameka Sosa PA-C   [facility] Hepatology clinic    -----------------------------------------------------       HPI:   Ricky Pruitt is a 39 year old male  presenting for the follow-up.    Hepatitis B:   E antigen: 8/22/2015  E antibody: 8/22/2015  -Diagnosed 2011   -Prior biopsy: 10/15/2015, see below  -Prior treatments: on tenofovir      ARAUJO:  - Biopsy, 10/15/2015: showing steatohepatitis with mild non-bridging pericellular fibrosis and mildly active hepatitis     Patent was  "last seen by me on 4/5/2018. No recent hospitalizations or ER visits. No new medications.     He has lost about 15 lbs over the past year. He has made changes in his diet, including more vegetables and fruits. He has cut down with meat. He has reduced rice intake from daily to 4 days a week. He has not followed up with his PCP in a few years.     Patient denies jaundice, lower extremity edema, abdominal distension or confusion.  Patient also denies melena, hematochezia or hematemesis. Patient denies weight loss, fevers, sweats or chills.    He does not drink alcohol. He works for the [employer].     Medical hx Surgical hx   Past Medical History:   Diagnosis Date     Diagnostic skin and sensitization tests (aka ALLERGENS) 5/13/15 skin tests pos. for:  cat/dog/DM/M/T/G/W     Hepatitis B      Seasonal allergic rhinitis     Past Surgical History:   Procedure Laterality Date     APPENDECTOMY  2009                 Medications:     Current Outpatient Medications   Medication     tenofovir (VIREAD) 300 MG tablet     naproxen (NAPROSYN) 500 MG tablet     No current facility-administered medications for this visit.             Allergies:   No Known Allergies         Review of Systems:   10 points ROS was obtained and highlighted in the HPI, otherwise negative.          Physical Exam:   VS:  /83   Pulse 89   Temp 97.6  F (36.4  C) (Oral)   Ht 1.753 m (5' 9\")   Wt 97.1 kg (214 lb 1.6 oz)   SpO2 95%   BMI 31.62 kg/m         Gen- well, NAD, A+Ox3, normal color  Lym- no palpable LAD  CVS- RRR  RS- CTA  Abd- central adiposity, soft, nontender.   Extr- hands normal, no LUIS EDUARDO  Skin- no rash or jaundice  Neuro- no asterixis  Psych- normal mood         Data:   Reviewed in person and significant for:    Lab Results   Component Value Date     02/29/2020      Lab Results   Component Value Date    POTASSIUM 4.2 02/29/2020     Lab Results   Component Value Date    CHLORIDE 100 02/29/2020     Lab Results   Component Value Date    " CO2 31 02/29/2020     Lab Results   Component Value Date    BUN 13 02/29/2020     Lab Results   Component Value Date    CR 0.81 02/29/2020       Lab Results   Component Value Date    WBC 6.6 02/29/2020     Lab Results   Component Value Date    HGB 16.1 02/29/2020     Lab Results   Component Value Date    HCT 47.3 02/29/2020     Lab Results   Component Value Date    MCV 85 02/29/2020     Lab Results   Component Value Date     02/29/2020       Lab Results   Component Value Date    AST 99 02/29/2020     Lab Results   Component Value Date     02/29/2020     Lab Results   Component Value Date    BILICONJ 0.0 05/06/2014      Lab Results   Component Value Date    BILITOTAL 1.4 02/29/2020       Lab Results   Component Value Date    ALBUMIN 3.7 02/29/2020     Lab Results   Component Value Date    PROTTOTAL 8.8 02/29/2020      Lab Results   Component Value Date    ALKPHOS 169 02/29/2020       Lab Results   Component Value Date    INR 1.13 02/29/2020             Again, thank you for allowing me to participate in the care of your patient.      Sincerely,    Shameka Sosa PA-C

## 2020-03-04 NOTE — PROGRESS NOTES
Hepatology Clinic note  Ricky Pruitt   Date of Birth 1980  Date of Service 3/4/2020         Assessment/plan:   Ricky Pruitt is a 39 year old male with ARAUJO/Hepatitis B, who has been maintained on tenofovir DF for viral suppression. His HBV DNA remains low correlating medication compliance. His transaminases have remained elevated, Alk Phos 169, AST 99 and  which is due to fatty liver disease.  His platelets are high normal, otherwise normal liver function. No physical signs of advanced fibrosis. His glucose was very elevated today, 293 and discussed with a likely diagnosis of diabetes. He was told that he needs to follow up with his PCP to optimize his lipid panel and glucose. We discussed what a significant impact this can have on fatty liver.     - Follow up with PCP to evaluate his blood glucose and treat as needed  - Continue slow gradual weight loss   - Limit carbohydrates in the diet, especially simple carbohydrates  - Continue regular physical activity   - Continue tenofovir disoproxil   - BMP, Hepatic panel, CBC, INR and HBV DNA in six months  - Repeat fibrosis scan in one year    -Follow-up in clinic in one year or sooner as needed     Shameka Sosa PA-C   [facility]    -----------------------------------------------------       HPI:   Ricky Pruitt is a 39 year old male  presenting for the follow-up.    Hepatitis B:   E antigen: 8/22/2015  E antibody: 8/22/2015  -Diagnosed 2011   -Prior biopsy: 10/15/2015, see below  -Prior treatments: on tenofovir      ARAUJO:  - Biopsy, 10/15/2015: showing steatohepatitis with mild non-bridging pericellular fibrosis and mildly active hepatitis     Patent was last seen by me on 4/5/2018. No recent hospitalizations or ER visits. No new medications.     He has lost about 15 lbs over the past year. He has made changes in his diet, including more vegetables and fruits. He has cut down with meat. He has reduced rice intake from daily to 4  "days a week. He has not followed up with his PCP in a few years.     Patient denies jaundice, lower extremity edema, abdominal distension or confusion.  Patient also denies melena, hematochezia or hematemesis. Patient denies weight loss, fevers, sweats or chills.    He does not drink alcohol. He works for the [employer].     Medical hx Surgical hx   Past Medical History:   Diagnosis Date     Diagnostic skin and sensitization tests (aka ALLERGENS) 5/13/15 skin tests pos. for:  cat/dog/DM/M/T/G/W     Hepatitis B      Seasonal allergic rhinitis     Past Surgical History:   Procedure Laterality Date     APPENDECTOMY  2009                 Medications:     Current Outpatient Medications   Medication     tenofovir (VIREAD) 300 MG tablet     naproxen (NAPROSYN) 500 MG tablet     No current facility-administered medications for this visit.             Allergies:   No Known Allergies         Review of Systems:   10 points ROS was obtained and highlighted in the HPI, otherwise negative.          Physical Exam:   VS:  /83   Pulse 89   Temp 97.6  F (36.4  C) (Oral)   Ht 1.753 m (5' 9\")   Wt 97.1 kg (214 lb 1.6 oz)   SpO2 95%   BMI 31.62 kg/m        Gen- well, NAD, A+Ox3, normal color  Lym- no palpable LAD  CVS- RRR  RS- CTA  Abd- central adiposity, soft, nontender.   Extr- hands normal, no LUIS EDUARDO  Skin- no rash or jaundice  Neuro- no asterixis  Psych- normal mood         Data:   Reviewed in person and significant for:    Lab Results   Component Value Date     02/29/2020      Lab Results   Component Value Date    POTASSIUM 4.2 02/29/2020     Lab Results   Component Value Date    CHLORIDE 100 02/29/2020     Lab Results   Component Value Date    CO2 31 02/29/2020     Lab Results   Component Value Date    BUN 13 02/29/2020     Lab Results   Component Value Date    CR 0.81 02/29/2020       Lab Results   Component Value Date    WBC 6.6 02/29/2020     Lab Results   Component Value Date    HGB 16.1 02/29/2020     Lab Results "   Component Value Date    HCT 47.3 02/29/2020     Lab Results   Component Value Date    MCV 85 02/29/2020     Lab Results   Component Value Date     02/29/2020       Lab Results   Component Value Date    AST 99 02/29/2020     Lab Results   Component Value Date     02/29/2020     Lab Results   Component Value Date    BILICONJ 0.0 05/06/2014      Lab Results   Component Value Date    BILITOTAL 1.4 02/29/2020       Lab Results   Component Value Date    ALBUMIN 3.7 02/29/2020     Lab Results   Component Value Date    PROTTOTAL 8.8 02/29/2020      Lab Results   Component Value Date    ALKPHOS 169 02/29/2020       Lab Results   Component Value Date    INR 1.13 02/29/2020

## 2020-05-27 ENCOUNTER — TELEPHONE (OUTPATIENT)
Dept: GASTROENTEROLOGY | Facility: CLINIC | Age: 40
End: 2020-05-27

## 2020-06-16 ENCOUNTER — NURSE TRIAGE (OUTPATIENT)
Dept: NURSING | Facility: CLINIC | Age: 40
End: 2020-06-16

## 2020-06-17 ENCOUNTER — VIRTUAL VISIT (OUTPATIENT)
Dept: FAMILY MEDICINE | Facility: CLINIC | Age: 40
End: 2020-06-17
Payer: COMMERCIAL

## 2020-06-17 DIAGNOSIS — Z11.4 SCREENING FOR HIV (HUMAN IMMUNODEFICIENCY VIRUS): ICD-10-CM

## 2020-06-17 DIAGNOSIS — E11.9 TYPE 2 DIABETES MELLITUS WITHOUT COMPLICATION, WITHOUT LONG-TERM CURRENT USE OF INSULIN (H): Primary | ICD-10-CM

## 2020-06-17 DIAGNOSIS — B37.2 CANDIDIASIS OF SKIN: ICD-10-CM

## 2020-06-17 DIAGNOSIS — Z82.49 FAMILY HISTORY OF ISCHEMIC HEART DISEASE: ICD-10-CM

## 2020-06-17 DIAGNOSIS — E11.9 TYPE 2 DIABETES MELLITUS WITHOUT COMPLICATION, WITHOUT LONG-TERM CURRENT USE OF INSULIN (H): ICD-10-CM

## 2020-06-17 LAB
CHOLEST SERPL-MCNC: 235 MG/DL
CREAT UR-MCNC: 127 MG/DL
HBA1C MFR BLD: 13.1 % (ref 0–5.6)
HDLC SERPL-MCNC: 46 MG/DL
LDLC SERPL CALC-MCNC: 143 MG/DL
MICROALBUMIN UR-MCNC: 19 MG/L
MICROALBUMIN/CREAT UR: 14.88 MG/G CR (ref 0–17)
NONHDLC SERPL-MCNC: 189 MG/DL
TRIGL SERPL-MCNC: 228 MG/DL

## 2020-06-17 PROCEDURE — 80061 LIPID PANEL: CPT | Performed by: INTERNAL MEDICINE

## 2020-06-17 PROCEDURE — 87389 HIV-1 AG W/HIV-1&-2 AB AG IA: CPT | Performed by: INTERNAL MEDICINE

## 2020-06-17 PROCEDURE — 99214 OFFICE O/P EST MOD 30 MIN: CPT | Mod: TEL | Performed by: INTERNAL MEDICINE

## 2020-06-17 PROCEDURE — 82043 UR ALBUMIN QUANTITATIVE: CPT | Performed by: INTERNAL MEDICINE

## 2020-06-17 PROCEDURE — 36415 COLL VENOUS BLD VENIPUNCTURE: CPT | Performed by: INTERNAL MEDICINE

## 2020-06-17 PROCEDURE — 83036 HEMOGLOBIN GLYCOSYLATED A1C: CPT | Performed by: INTERNAL MEDICINE

## 2020-06-17 RX ORDER — KETOCONAZOLE 20 MG/G
CREAM TOPICAL DAILY
Qty: 30 G | Refills: 3 | Status: SHIPPED | OUTPATIENT
Start: 2020-06-17 | End: 2021-10-27

## 2020-06-17 RX ORDER — GLUCOSAMINE HCL/CHONDROITIN SU 500-400 MG
CAPSULE ORAL
Qty: 100 EACH | Refills: 3 | Status: SHIPPED | OUTPATIENT
Start: 2020-06-17 | End: 2021-10-27

## 2020-06-17 RX ORDER — LANCETS
EACH MISCELLANEOUS
Qty: 100 EACH | Refills: 3 | Status: SHIPPED | OUTPATIENT
Start: 2020-06-17 | End: 2021-10-27

## 2020-06-17 NOTE — TELEPHONE ENCOUNTER
Patient's wife tested patient's blood sugar and was 284 and then again was 324, and feels patient should be evaluated for diabetes.  He denies symptoms.  Will see provider within 24 hours per guidelines and call back for symptoms.    Arely Crowder RN  [facility]           Additional Information    Negative: Unconscious or difficult to awaken    Negative: Acting confused (e.g., disoriented, slurred speech)    Negative: Very weak (e.g., can't stand)    Negative: Sounds like a life-threatening emergency to the triager    Negative: [1] Vomiting AND [2] signs of dehydration (e.g., very dry mouth, lightheaded, dark urine)    Negative: [1] Blood glucose > 240 mg/dL (13.3 mmol/L) AND [2] rapid breathing    Negative: Blood glucose > 500 mg/dL (27.8 mmol/L)    Negative: [1] Blood glucose > 240 mg/dL (13.3 mmol/L) AND [2] urine ketones moderate-large (or more than 1+)    Negative: [1] Blood glucose > 240 mg/dL (13.3 mmol/L) AND [2] blood ketones > 1.4 mmol/L    Negative: [1] Blood glucose > 240 mg/dL (13.3 mmol/L) AND [2] vomiting AND [3] unable to check for ketones (in blood or urine)    Negative: [1] New onset Diabetes suspected (e.g., frequent urination, weak, weight loss) AND [2] vomiting or rapid breathing    Negative: Vomiting lasts > 4 hours    Negative: Patient sounds very sick or weak to the triager    Negative: Fever > 100.5 F (38.1 C)    Negative: Blood glucose > 400 mg/dL (22.2 mmol/L)    Negative: [1] Blood glucose > 300 mg/dL (16.7 mmol/L) AND [2] two or more times in a row    Negative: Urine ketones moderate - large (or blood ketones > 1.4 mmol/L)    Negative: [1] Caller has URGENT medication or insulin pump question AND [2] triager unable to answer question    Negative: [1] Symptoms of high blood sugar (e.g., frequent urination, weak, weight loss) AND [2] not able to test blood glucose    New onset diabetes suspected (e.g., frequent urination, weakness, weight loss)    Protocols used: DIABETES -  HIGH BLOOD SUGAR-A-AH

## 2020-06-17 NOTE — PROGRESS NOTES
"Ricky Pruitt is a 39 year old male who is being evaluated via a billable telephone visit.      The patient has been notified of following:     \"This telephone visit will be conducted via a call between you and your physician/provider. We have found that certain health care needs can be provided without the need for a physical exam.  This service lets us provide the care you need with a short phone conversation.  If a prescription is necessary we can send it directly to your pharmacy.  If lab work is needed we can place an order for that and you can then stop by our lab to have the test done at a later time.    Telephone visits are billed at different rates depending on your insurance coverage. During this emergency period, for some insurers they may be billed the same as an in-person visit.  Please reach out to your insurance provider with any questions.    If during the course of the call the physician/provider feels a telephone visit is not appropriate, you will not be charged for this service.\"    Patient has given verbal consent for Telephone visit?  Yes    What phone number would you like to be contacted at? [phone]    How would you like to obtain your AVS? Sarah    Subjective     Ricky Pruitt is a 39 year old male who presents via phone visit today for the following health issues:    HPI       Triaged yesterday: \" Patient's wife tested patient's blood sugar and was 284 and then again was 324, and feels patient should be evaluated for diabetes.\"  His wife had gestational diabetes so they had a glucometer at home and just decided to check his blood sugars today.  His blood sugar had been high in Feb on routine blood work for monitoring on the Hep B and his hepatologist had recommended f/u with primary care, but he hadn't gotten the chance yet due to COVID pandemic.      He has a strong family history of CAD and his wife wants to know about risk evaluation for Ricky.  His mother had gestational diabetes, " wife thinks his uncle had diabetes as well.          Blood sugar    How often are you checking your blood sugar? This morning 248 fasting, last night after dinner 324  What time of day are you checking your blood sugars (select all that apply)?  Before and after meals  Have you had any blood sugars above 200?  Yes see above  Have you had any blood sugars below 70?  No  What symptoms do you notice when your blood sugar is low?  None    What concerns do you have today about your diabetes? Never diagnostic with diabetes     Do you have any of these symptoms? (Select all that apply)  No numbness or tingling in feet.  No redness, sores or blisters on feet.  No complaints of excessive thirst.  No reports of blurry vision.  No significant changes to weight.     He does eat a lot of white rice.  Drinks Gatorade     He's had some red bumps, itchy, burning, and white discharge on his penis intermittently for month, skin gets very dry and painful.      BP Readings from Last 2 Encounters:   03/04/20 120/83   01/04/19 106/70     LDL Cholesterol Calculated (mg/dL)   Date Value   11/21/2017 88                    Patient Active Problem List   Diagnosis     Viral hepatitis B chronic (H)     Hand dermatitis     Obesity     Chronic idiopathic urticaria     Diagnostic skin and sensitization tests (aka ALLERGENS)     Seasonal allergic rhinitis     Nonalcoholic hepatosteatosis     Type 2 diabetes mellitus without complication, without long-term current use of insulin (H)     Past Surgical History:   Procedure Laterality Date     APPENDECTOMY  2009       Social History     Tobacco Use     Smoking status: Never Smoker     Smokeless tobacco: Never Used   Substance Use Topics     Alcohol use: No     Alcohol/week: 0.0 standard drinks     Family History   Problem Relation Age of Onset     Liver Disease Mother         Hepatitis B     Eye Disorder Father      Liver Disease Sister         Hepatitis B     Eye Disorder Sister          Current  Outpatient Medications   Medication Sig Dispense Refill     alcohol swab prep pads Use to swab area of injection/jun as directed. 100 each 3     blood glucose (NO BRAND SPECIFIED) test strip Use to test blood sugar 1 times daily or as directed. To accompany: Blood Glucose Monitor Brands: Accu-check 100 strip 6     ketoconazole (NIZORAL) 2 % external cream Apply topically daily 30 g 3     tenofovir (VIREAD) 300 MG tablet Take 1 tablet (300 mg) by mouth daily 30 tablet 0     thin (NO BRAND SPECIFIED) lancets Use with lanceting device. To accompany: Blood Glucose Monitor Brands: Accucheck (fast click) 100 each 3     naproxen (NAPROSYN) 500 MG tablet TAKE 1 TABLET TWICE PER DAY WITH FOOD FOR 1 WEEK THEN EVERY 12 HOURS AS NEEDED (Patient not taking: Reported on 3/4/2020) 40 tablet 0     No Known Allergies    Reviewed and updated as needed this visit by Provider         Review of Systems   Constitutional, endo, CV systems are negative, except as otherwise noted.       Objective   Reported vitals:  There were no vitals taken for this visit.   healthy, alert and no distress  PSYCH: Alert and oriented times 3; coherent speech, normal   rate and volume, able to articulate logical thoughts, able   to abstract reason, no tangential thoughts, no hallucinations   or delusions  His affect is normal  RESP: No cough, no audible wheezing, able to talk in full sentences  Remainder of exam unable to be completed due to telephone visits    Diagnostic Test Results:  Labs reviewed in Epic  No results found for this or any previous visit (from the past 24 hour(s)).        Assessment/Plan:  1. Type 2 diabetes mellitus without complication, without long-term current use of insulin (H)    Home blood sugars are consistent with diabetes.  Will check A1C to confirm, but proceeded with discussion of pathophysiology, complications, and treatments for diabetes.  Will also check lipids and microalbumin.  Advised to check blood sugars once daily.   Briefly discussed dietary change; he is quite interested in seeing diabetic ed to discuss in more detail.  If A1C is significantly elevated, we discussed medication options.  Given his chronic Hep B, metformin may be higher risk, so we discussed GLP-1RA as the next alternative, and he is agreeable to this med.  Looks like Ozempic should be covered by insurance.  Will send prescription after A1C level is back.  Recheck A1C and office visit in 3 months, but we'll be in touch on home blood sugars in the meantime.  Advised him on vaccines that he is due for.  Discussed annual foot and eye exams.     - **A1C FUTURE anytime; Future  - Lipid panel reflex to direct LDL Fasting; Future  - Albumin Random Urine Quantitative with Creat Ratio; Future  - blood glucose (NO BRAND SPECIFIED) test strip; Use to test blood sugar 1 times daily or as directed. To accompany: Blood Glucose Monitor Brands: Accu-check  Dispense: 100 strip; Refill: 6  - thin (NO BRAND SPECIFIED) lancets; Use with lanceting device. To accompany: Blood Glucose Monitor Brands: Accucheck (fast click)  Dispense: 100 each; Refill: 3  - alcohol swab prep pads; Use to swab area of injection/jun as directed.  Dispense: 100 each; Refill: 3  - AMBULATORY ADULT DIABETES EDUCATOR REFERRAL; Future    2. Candidiasis of skin    Rash on his penis may be candida related to glucosuria.  Hopefully should be less frequent after starting treatment for the diabetes, provided topical for treatment.      - ketoconazole (NIZORAL) 2 % external cream; Apply topically daily  Dispense: 30 g; Refill: 3    3. Screening for HIV (human immunodeficiency virus)    - HIV Screening; Future    4. Family history of ischemic heart disease    He reports a strong family history of early cardiac disease and his wife would like him to have further risk stratification.      - CT Coronary Calcium Scan; Future    No follow-ups on file.      Phone call duration:  30 minutes    Bashir Munson MD

## 2020-06-17 NOTE — PROGRESS NOTES
From result note:    Your A1C is quite high at 13.1!  We definitely need to get medication started along with work on lifestyle changes.  I sent the prescription for the Ozempic over to the pharmacy.  You'll be on the low dose for a month before increasing.  Let me know if you have any stomach issues with this medicine.  Your urine protein level is normal.  Your cholesterol and triglycerides are somewhat high.  For people with diabetes who are over 40 years old, we automatically recommend being on cholesterol meds to reduce heart attack risk.  Since you are less than 40, we don't necessarily have to start medication yet.  However, if you have a high calcium score on the heart CT scan when you have that done, we'll probably want to start medication sooner.  We can just try diet to reduce the cholesterol for now, and we can recheck cholesterol and A1C in 3 months.     Bashir Munson MD

## 2020-06-17 NOTE — PATIENT INSTRUCTIONS
Coronary artery calcium scoring CT scan was ordered.  Please call [phone] to schedule.     Also call the lab to schedule a lab appointment for blood and urine testing.  [phone]    You are due for a tetanus and pneumonia (Pneumovax) vaccines.  You can get these at your pharmacy or schedule a medical assistant appointment.       Patient Education   Goals for Your Diabetes Care  A1c   Goal:  Less than 7 or 8 percent- since you are younger, we may want to target less than 7 to reduce risks of long term complications  Check it: Every 3 to 6 months  Why:  Shows how well your blood glucose was controlled over the past 3 months  Blood pressure   Goal: Under 130/80  Check it:  At every clinic check up for diabetes  Why:  May prevent stroke, heart disease and eye and kidney diseases  Cholesterol   Goal:  Discuss cholesterol management with your doctor, and consider taking a statin medicine  Check it:  Every year  Why:  Helps prevent heart attacks and stroke  Kidney test (urine microalbumin)  Goal:  Under 30  Do it:  Every year  Why:  Finds kidney problems before they get worse  Foot exam   Goal:  No cuts or sores, normal sensation  Do it: Remove shoes and socks at every visit; have a monofilament test every year  Why: Finds foot problems before they get worse  Eye exam   Goal:  No changes in your eyes  Do it: Every year  Why:  Finds eye problems before they get worse  Exercise  Goal:  150 minutes of aerobic exercises and 2 to 3 sessions of strength training  Do it: Every week  Why:  Helps manage diabetes and improve health  Aspirin  Goal:  If you are age 50 or older, ask your doctor if you should take aspirin  Take it: Every day, or as prescribed  Why: Lowers the risk of heart attack and stroke  Smoking and tobacco  Goal: No tobacco use  Why: Tobacco is a major risk for heart disease  Diabetes education  Goal: Learn how to manage your diabetes  Do it: At least once a year--ask your doctor for a referral  Why: The  more you know, the better you can manage your diabetes  Your goals may be different from what you see here. Your care team will tell you what your goals should be.   For informational purposes only. Not to replace the advice of your health care provider.   Copyright   2009 [facility]. All rights reserved. [employer] [id number] - Rev 01/16.       Patient Education   Living Healthy with Diabetes  Healthful eating  Healthful eating means eating well-balanced meals and snacks at regular times.    Eat a variety of healthy foods to help control blood glucose (blood sugar).    Space your meals during the day. Try to eat a meal every 4 to 5 hours. Include a variety of foods from all food groups.    If you wish, you may have one or two small snacks between meals. Snacks should be nutritious and lower in calories than a meal. (For example: fat-free yogurt, 1/2 cup fruit, 3 cups popcorn, 1/4 cup nuts). Ask your dietitian about healthful snacks.    Do not skip meals.  There are no foods that you cannot eat. But it is important to know which foods most affect your blood glucose.  Three main nutrients give the body energy (calories):    Carbohydrate    Protein    Fat    Foods with carbohydrate will raise blood glucose. These foods include breads, pasta, cereals, rice, fruits, milk and yogurt.    Do not avoid carbohydrate. Eating the right amount of carbohydrate at meals and snacks will help to control blood glucose.    Check blood glucose as directed by your health care provider to see how food choices affect it.  Healthy Eating at a Glance  ? Pay attention to portion sizes.  ? Eat a variety of healthy foods every day.  ? Choose foods high in nutrition:    Fruits and vegetables    Beans and legumes    Whole grains    Heart healthy fats    Lean meats and proteins    Foods without added sodium, sugars and fat    Manage your weight  Managing weight is not only about how much you eat, but also about the quality of foods you  eat.    Eat smaller portions.    Eat less sugar.    Eat less of the high-fat foods.  ? Eat smaller portions of healthy fats such as nuts, avocado and olives.  ? Limit fried foods, fatty meats (jaramillo, sausage, hot dogs, cold cuts), butter, salad dressings, cream, gravy, chips, bakery items, whole milk, ice cream, pizza, fast food and hard cheeses.    Choose high-fiber foods such as vegetables, fruits and whole-grain breads and cereals.  ? These foods help you to feel more satisfied with your meal or snack. They are full of nutrients.    Eat mindfully  ? Listen to your body's signals for hunger. Eat when you feel hungry and stop when you start to feel full.  ? Avoid eating when bored, sad or upset.  Physical activity  Activity can help control your glucose levels. The American Diabetes Association recommends 150 minutes of moderate physical activity (walking, biking, swimming) a week or 75 minutes of vigorous activity (jogging, aerobics) per week. Add to that strength training (lifting weights, resistance bands) two or three days a week.  Talk to your doctor before starting an activity program. This is very important if:    You are over age 35.    You have had type 1 diabetes for more than 15 years.    You have had type 2 diabetes for more than 10 years.    You have any risk factors for heart or artery disease (such as high blood pressure, high cholesterol or being overweight).    You have a history of heart or artery disease.    You have any kind of nerve damage (neuropathy).    You have eye disease (retinopathy).  For informational purposes only. Not to replace the advice of your health care provider.  Copyright   2007 [facility]. All rights reserved. [employer] [id number] - REV 03/16.       Patient Education   Sample Meal Plan for Diabetes  Breakfast (4 carb choices, 60 grams carbohydrate)  Coffee, tea or water  1 cup (8 ounces) skim or 1% milk (1-carb choice)  1 small piece of fresh fruit or 1 cup  berries (1-carb choice)  Any one of the following (2-carb choice):    1 slice toast with 1 tablespoon of margarine or peanut butter and   cup of cereal with skim or 1% milk    1 cup cereal with skim or 1% milk    1 egg and 2 slices toast with 1 tablespoon margarine or peanut butter  Lunch (4 carb choices, 60 grams carbohydrate)  Coffee, tea or water  1 cup (8 ounces) skim or 1% milk (1-carb choice)  Small piece fresh fruit or 1 cup berries (1-carb choice)  Raw vegetables (add to sandwich or serve on the side)  Any one of the following (2-carb choice):     [place] (made with 2 slices whole-grain bread)      sandwich with 1 cup soup    2 corn tortillas with meat and vegetables  Dinner (4 carb choices, 60 grams carbohydrate)  Coffee, tea or water  Breast of chicken or pork chop (3 to 4 ounces)  Cooked vegetable  Tossed salad with small amount of low-fat dressing  1 cup (8 ounces) skim or 1% milk (1-carb choice)  1 small piece fruit or   cup canned fruit, packed in juice or light syrup (1-carb choice)  Any one of the following (2-carb choice):    Medium baked potato    1 cup mashed potato    2/3 cup rice or pasta  Snacks (1 or 2 carb choices, 15 to 30 grams carbohydrate)  Any one or two of the following:    Small piece fresh fruit    3 nancy cracker squares    1 cup raw vegetables with low-fat dip    1 ounce (12 to 15) baked chips with salsa    Light yogurt (100 calories)    1 cup (8 ounces) skim or 1% milk    3 cups popped popcorn    6 vanilla wafers  For informational purposes only. Not to replace the advice of your health care provider.   Copyright   2007 [facility]. All rights reserved. [employer] [id number] - REV 04/16.       Patient Education   What Is Diabetes?  Diabetes is a disease that causes too much sugar in the blood. It's a lifelong disease. It doesn't go away, but you can manage it.   Managing diabetes means managing the sugar, or glucose, in your blood. When blood glucose is managed well,  people with diabetes can live long, healthy lives.   What are the signs of diabetes?  You may have some, all or none of these problems:    Extreme thirst    Needing to pee (urinate) more often    Headache or blurred vision    Hunger    Feeling drowsy or tired    Slow healing after an illness or injury    Getting infections often  How does diabetes affect my body?  Your body's main source of energy is glucose, a kind of sugar. A hormone called insulin carries glucose from your blood into your body's cells.   If you have diabetes, your body doesn't make enough insulin, or the insulin it makes doesn't work the way it should. So, glucose builds up in your blood. This is called high blood glucose.  Over time, high blood glucose hurts blood vessels and nerves. It also makes it harder for your body to heal and fight infection. This can lead to serious problems in the eyes, kidneys, heart, legs and feet. Managing your blood glucose helps prevent these problems.  What should I do if I have diabetes?  Learn how to manage your diabetes. The goal is to keep your blood glucose as close to normal as possible. (Normal is 60 to 99.) This way, you can prevent or reduce damage to your body.   To manage your diabetes:    Eat a wide range of healthy foods.    Manage your weight.    Be physically active.    Check your blood glucose as prescribed.    Manage your blood pressure and cholesterol.    Take your medicines as prescribed.  Ask your doctor to write a referral so you can take diabetes classes. These classes are offered at your clinic or nearby hospital. The classes give you the tools and knowledge you need.   Are there different kinds of diabetes?  There are two kinds of diabetes: type 1 and type 2.  Type 1 diabetes  Type 1 diabetes happens when your body's immune system destroys the cells that make insulin. Your body can't make insulin on its own. People who have type 1 diabetes must take insulin.  Type 2 diabetes  With type 2  diabetes, your body makes its own insulin. But it doesn't make enough, or the insulin it makes doesn't work well. This is the most common kind of diabetes. Often, people don't know they have type 2 diabetes until they get a routine blood test.  You are more likely to get type 2 diabetes if you:    Are overweight.    Have high blood pressure.    Have high cholesterol.    Are not physically active.    Have a family history of type 2 diabetes.    Are , /, ,  American or .    Have given birth to a baby weighing more than 9 pounds, or have gotten diabetes while pregnant (gestational diabetes).  To learn more  American Diabetes Association  www.diabetes.org  4-339-422-0784 (1-800-Diabetes)  National Diabetes Education Program  ndep.nih.gov  6-215-472-8628   For informational purposes only. Not to replace the advice of your health care provider. Copyright   2006 [employer]. All rights reserved. Clinically reviewed by [employer]. [employer] [id number] - REV 08/18.       Patient Education     A1C  Does this test have other names?  Hemoglobin A1c; HbA1c; glycosylated hemoglobin; glycohemoglobin; Glycated hemoglobin  What is this test?  A1C is a blood test that shows average blood sugar (glucose) levels over the last 3 months. The test is done to find out if a person has diabetes or prediabetes. It's also used to see how well a person with diabetes controls their blood sugar. The test can help guide diabetes treatment over time.  Why do I need this test?  You may need this test to check for prediabetes or diabetes.  If you have diabetes or prediabetes, you may need this test to see how well you control your blood sugar. People with diabetes need to track their blood sugar (glucose) levels every day to make sure they aren t too high or too low. The A1C test gives results for a longer period of time. It shows if your blood sugar has been  too high on average over the last 3 months.   Glucose sticks to hemoglobin in the blood. Hemoglobin is a protein in red blood cells that carries oxygen. When blood sugar is high, more glucose builds up and sticks to the hemoglobin. The A1C test measures how much of the hemoglobin is coated with sugar.  You may have the test when a healthcare provider first works with you to treat your diabetes. You may then need to have the A1C test 2 or more times a year. This depends on the type of diabetes you have and how well it s controlled. The American Diabetes Association (ADA) advises an A1C test at least 2 times a year if you are meeting your blood sugar goals. If you aren t meeting your goals or your medicine has changed, you should have the A1C test more often.  What other tests might I have along with this test?   If your healthcare provider tests you for diabetes, you may also have any of these tests:    Fasting plasma glucose blood test (FPG)    Oral glucose tolerance test (OGTT)    Urine test to check for sugar, ketones, or protein  What do my test results mean?  Test results may vary depending on your age, gender, health history, the method used for the test, and other things. Your test results may not mean you have a problem. Ask your healthcare provider what your test results mean for you.   A1C results are reported as a percentage. Here are what the results mean:    A1C below 5.7%. This is normal.    A1C from 5.7% to 6.4%. You may have prediabetes. This means you have a higher risk for diabetes in the future.    A1C of 6.5% or above on 2 separate tests. You may have diabetes.   The ADA says that people with diabetes should keep an A1C below 7%. The American Association of Clinical Endocrinologists advises an A1C of 6.5% or less. Your healthcare provider may give you other advice. This is based on your age, health conditions, and other things.    How is this test done?  The test is done with a blood sample. A  needle is used to draw blood from a vein in your arm or hand.   Does this test pose any risks?  Having a blood test with a needle carries some risks. These include bleeding, infection, bruising, and feeling lightheaded. When the needle pricks your arm or hand, you may feel a slight sting or pain. Afterward, the site may be sore.  What might affect my test results?  Your blood sugar levels change throughout the day. This won't affect the A1C test result.  If you have sickle cell anemia or other blood disorders, an A1C test may be less useful for diagnosing or watching diabetes. Your healthcare provider may tell you to use a different test that will work better for you.  The test results may be less accurate if you have any of the below:    Anemia    Heavy bleeding    Iron deficiency    Kidney failure    Liver disease  How do I get ready for this test?  You don't need to get ready for the test.      2264-1061 [employer]. [address] [place] [address], [place] [address]. All rights reserved. This information is not intended as a substitute for professional medical care. Always follow your healthcare professional's instructions.

## 2020-06-17 NOTE — RESULT ENCOUNTER NOTE
Your A1C is quite high at 13.1!  We definitely need to get medication started along with work on lifestyle changes.  I sent the prescription for the Ozempic over to the pharmacy.  You'll be on the low dose for a month before increasing.  Let me know if you have any stomach issues with this medicine.  Your urine protein level is normal.  Your cholesterol and triglycerides are somewhat high.  For people with diabetes who are over 40 years old, we automatically recommend being on cholesterol meds to reduce heart attack risk.  Since you are less than 40, we don't necessarily have to start medication yet.  However, if you have a high calcium score on the heart CT scan when you have that done, we'll probably want to start medication sooner.  We can just try diet to reduce the cholesterol for now, and we can recheck cholesterol and A1C in 3 months.

## 2020-06-18 ENCOUNTER — HOSPITAL ENCOUNTER (OUTPATIENT)
Dept: CT IMAGING | Facility: CLINIC | Age: 40
Discharge: HOME | End: 2020-06-18
Attending: INTERNAL MEDICINE | Admitting: INTERNAL MEDICINE
Payer: COMMERCIAL

## 2020-06-18 DIAGNOSIS — Z82.49 FAMILY HISTORY OF ISCHEMIC HEART DISEASE: ICD-10-CM

## 2020-06-18 LAB — HIV 1+2 AB+HIV1 P24 AG SERPL QL IA: NONREACTIVE

## 2020-06-18 PROCEDURE — 75571 CT HRT W/O DYE W/CA TEST: CPT | Mod: 26 | Performed by: INTERNAL MEDICINE

## 2020-06-18 PROCEDURE — 75571 CT HRT W/O DYE W/CA TEST: CPT

## 2020-07-08 ENCOUNTER — TELEPHONE (OUTPATIENT)
Dept: FAMILY MEDICINE | Facility: CLINIC | Age: 40
End: 2020-07-08

## 2020-07-08 NOTE — TELEPHONE ENCOUNTER
Diabetes Education Scheduling Outreach #1:    Call to patient to schedule. Left message with phone number to call to schedule.    Plan for 2nd outreach attempt within 1 week.    Yoli Camejo  Anna OnCall  Diabetes and Nutrition Scheduling

## 2020-07-14 ENCOUNTER — MYC MEDICAL ADVICE (OUTPATIENT)
Dept: FAMILY MEDICINE | Facility: CLINIC | Age: 40
End: 2020-07-14

## 2020-07-14 NOTE — TELEPHONE ENCOUNTER
Dr. Munson,    Patient sends updates on his life style changes and blood sugar readings. Annamaria PATE RN

## 2020-08-26 ENCOUNTER — ALLIED HEALTH/NURSE VISIT (OUTPATIENT)
Dept: FAMILY MEDICINE | Facility: CLINIC | Age: 40
End: 2020-08-26
Payer: COMMERCIAL

## 2020-08-26 DIAGNOSIS — Z11.1 SCREENING EXAMINATION FOR PULMONARY TUBERCULOSIS: Primary | ICD-10-CM

## 2020-08-26 PROCEDURE — 86580 TB INTRADERMAL TEST: CPT

## 2020-08-26 PROCEDURE — 99207 ZZC NO CHARGE NURSE ONLY: CPT

## 2020-08-26 NOTE — NURSING NOTE
The patient is asked the following questions today and these are his answers:    -Have you had a mantoux administered in the past 30 days?    No  -Have you had a previous positive Mantoux.  No  -Have you received BCG in the past.  No  -Have you had a live vaccine  (MMR, Varicella, OPV, Yellow Fever) in the last 6 weeks.  No  -Have you had and active  viral or bacterial infection in the past 6 weeks.  No  -Have you received corticosteroids or immunosuppressive agents in the past 6 weeks.  No  -Have you been diagnosed with HIV?  No  -Do you have a malignancy?  No    Mantoux Questionnaire: answers were all negative.    Andrea ALBERTO CMA

## 2020-08-28 ENCOUNTER — ALLIED HEALTH/NURSE VISIT (OUTPATIENT)
Dept: FAMILY MEDICINE | Facility: CLINIC | Age: 40
End: 2020-08-28
Payer: COMMERCIAL

## 2020-08-28 ENCOUNTER — TELEPHONE (OUTPATIENT)
Dept: FAMILY MEDICINE | Facility: CLINIC | Age: 40
End: 2020-08-28

## 2020-08-28 DIAGNOSIS — Z11.1 TUBERCULOSIS SCREENING: Primary | ICD-10-CM

## 2020-08-28 LAB
PPDINDURATION: 0 MM (ref 0–5)
PPDREDNESS: 5 MM

## 2020-08-28 PROCEDURE — 99207 ZZC NO CHARGE NURSE ONLY: CPT

## 2020-08-28 NOTE — TELEPHONE ENCOUNTER
Patient in for a mantoux results read - negative.  Was required for his work.  He requested results be sent to [facility], Thong Barrios (ph: [phone]) and Luz Elena Rich with [facility] (ph: [phone]).  He had potential exposure at work and both counties requesting - one he works and other he resides.    CSS:  ARCADIO GONSALVES for both Thong and Luz Elena Andersen to get fax # for results to be sent.    Results letter is backwards on first cube for CSS.    Jada ALBERTO RN BSN

## 2020-08-28 NOTE — TELEPHONE ENCOUNTER
CSS - the letter needs to be faxed to Thong as well.  A message was left for him.  Please get his fax # when he returns call.    Jada ALBERTO RN BSN

## 2020-08-28 NOTE — TELEPHONE ENCOUNTER
Faxed [employer] results to Luz Elena Andersen, fax # [phone].Lisa Francis on 8/28/2020 at 4:23 PM

## 2020-08-28 NOTE — LETTER
August 28, 2020      Ricky Pruitt  [id number]  [place] [id number]        To Whom It May Concern,     Mantoux results:   Negative  No induration.  No swelling.  Redness/bruising 5mm      Sincerely,        [place] FP/IM RN

## 2020-09-01 NOTE — TELEPHONE ENCOUNTER
Left message for the patient to call the clinic.  I faxed to the number provided and mailed the letter to the patient. Annamaria PATE RN

## 2020-09-30 NOTE — TELEPHONE ENCOUNTER
Diabetes Education Scheduling Outreach #2:    Call to patient to schedule. Left message with phone number to call to schedule.    Yoli Camejo  Lamar OnCall  Diabetes and Nutrition Scheduling

## 2020-11-03 DIAGNOSIS — E11.9 TYPE 2 DIABETES MELLITUS WITHOUT COMPLICATION, WITHOUT LONG-TERM CURRENT USE OF INSULIN (H): ICD-10-CM

## 2020-11-03 NOTE — TELEPHONE ENCOUNTER
"Requested Prescriptions   Pending Prescriptions Disp Refills     OZEMPIC (0.25 OR 0.5 MG/DOSE) 2 MG/1.5ML SOPN [Pharmacy Med Name: OZEMPIC 0.25 OR 0.5MG/DOSE (1 PEN/BOX)]  3     Sig: INJECT 0.25MG ONCE WEEKLY FOR 4 WEEKS, THEN INCREASE TO INJECT 0.5MG ONCE WEEKLY       GLP-1 Agonists Protocol Failed - 11/3/2020  9:47 AM        Failed - HgbA1C in past 3 or 6 months     If HgbA1C is 8 or greater, it needs to be on file within the past 3 months.  If less than 8, must be on file within the past 6 months.     Recent Labs   Lab Test 06/17/20  1130   A1C 13.1*             Passed - Medication is active on med list        Passed - Patient is age 18 or older        Passed - Normal serum creatinine on file in past 12 months     Recent Labs   Lab Test 02/29/20  0857   CR 0.81       Ok to refill medication if creatinine is low          Passed - Recent (6 mo) or future (30 days) visit within the authorizing provider's specialty     Patient had office visit in the last 6 months or has a visit in the next 30 days with authorizing provider.  See \"Patient Info\" tab in inbasket, or \"Choose Columns\" in Meds & Orders section of the refill encounter.                 "

## 2020-11-06 NOTE — TELEPHONE ENCOUNTER
Routing refill request to provider for review/approval because:  Labs out of range:  A1C    Jada ALBERTO MSN, RN

## 2020-11-11 RX ORDER — SEMAGLUTIDE 1.34 MG/ML
0.5 INJECTION, SOLUTION SUBCUTANEOUS WEEKLY
Qty: 1.5 ML | Refills: 0 | Status: SHIPPED | OUTPATIENT
Start: 2020-11-11 | End: 2020-12-31

## 2020-11-11 NOTE — TELEPHONE ENCOUNTER
Message left for patient to check the pharmacy.  Needs office visit for diabetic check for further refills. Annamaria PATE RN

## 2020-11-16 ENCOUNTER — HEALTH MAINTENANCE LETTER (OUTPATIENT)
Age: 40
End: 2020-11-16

## 2020-12-30 ASSESSMENT — ENCOUNTER SYMPTOMS
HEMATURIA: 0
PARESTHESIAS: 0
PALPITATIONS: 0
FREQUENCY: 0
CONSTIPATION: 0
SORE THROAT: 0
DYSURIA: 0
NAUSEA: 0
HEMATOCHEZIA: 0
EYE PAIN: 0
COUGH: 0
ABDOMINAL PAIN: 0
MYALGIAS: 0
ARTHRALGIAS: 0
NERVOUS/ANXIOUS: 0
CHILLS: 0
DIARRHEA: 0
DIZZINESS: 0
SHORTNESS OF BREATH: 0
HEADACHES: 0
WEAKNESS: 0
HEARTBURN: 0
JOINT SWELLING: 0
FEVER: 0

## 2020-12-31 ENCOUNTER — OFFICE VISIT (OUTPATIENT)
Dept: FAMILY MEDICINE | Facility: CLINIC | Age: 40
End: 2020-12-31
Payer: COMMERCIAL

## 2020-12-31 VITALS
TEMPERATURE: 96.7 F | HEART RATE: 90 BPM | WEIGHT: 225 LBS | DIASTOLIC BLOOD PRESSURE: 84 MMHG | RESPIRATION RATE: 12 BRPM | HEIGHT: 69 IN | OXYGEN SATURATION: 97 % | SYSTOLIC BLOOD PRESSURE: 115 MMHG | BODY MASS INDEX: 33.33 KG/M2

## 2020-12-31 DIAGNOSIS — Z00.00 ROUTINE GENERAL MEDICAL EXAMINATION AT A HEALTH CARE FACILITY: Primary | ICD-10-CM

## 2020-12-31 DIAGNOSIS — K76.0 NONALCOHOLIC HEPATOSTEATOSIS: ICD-10-CM

## 2020-12-31 DIAGNOSIS — B18.1 CHRONIC VIRAL HEPATITIS B WITHOUT DELTA AGENT AND WITHOUT COMA (H): ICD-10-CM

## 2020-12-31 DIAGNOSIS — E11.9 TYPE 2 DIABETES MELLITUS WITHOUT COMPLICATION, WITHOUT LONG-TERM CURRENT USE OF INSULIN (H): ICD-10-CM

## 2020-12-31 DIAGNOSIS — B18.1 VIRAL HEPATITIS B CHRONIC (H): ICD-10-CM

## 2020-12-31 DIAGNOSIS — E11.9 ENCOUNTER FOR DIABETIC FOOT EXAM (H): ICD-10-CM

## 2020-12-31 LAB
ALBUMIN SERPL-MCNC: 3.6 G/DL (ref 3.4–5)
ALP SERPL-CCNC: 126 U/L (ref 40–150)
ALT SERPL W P-5'-P-CCNC: 118 U/L (ref 0–70)
ANION GAP SERPL CALCULATED.3IONS-SCNC: 6 MMOL/L (ref 3–14)
AST SERPL W P-5'-P-CCNC: 51 U/L (ref 0–45)
BILIRUB DIRECT SERPL-MCNC: 0.2 MG/DL (ref 0–0.2)
BILIRUB SERPL-MCNC: 1.4 MG/DL (ref 0.2–1.3)
BUN SERPL-MCNC: 18 MG/DL (ref 7–30)
CALCIUM SERPL-MCNC: 9.1 MG/DL (ref 8.5–10.1)
CHLORIDE SERPL-SCNC: 103 MMOL/L (ref 94–109)
CHOLEST SERPL-MCNC: 198 MG/DL
CO2 SERPL-SCNC: 26 MMOL/L (ref 20–32)
CREAT SERPL-MCNC: 0.79 MG/DL (ref 0.66–1.25)
ERYTHROCYTE [DISTWIDTH] IN BLOOD BY AUTOMATED COUNT: 13.3 % (ref 10–15)
GFR SERPL CREATININE-BSD FRML MDRD: >90 ML/MIN/{1.73_M2}
GLUCOSE SERPL-MCNC: 152 MG/DL (ref 70–99)
HBA1C MFR BLD: 7.6 % (ref 0–5.6)
HCT VFR BLD AUTO: 46.3 % (ref 40–53)
HDLC SERPL-MCNC: 38 MG/DL
HGB BLD-MCNC: 16.3 G/DL (ref 13.3–17.7)
INR PPP: 1.01 (ref 0.86–1.14)
LDLC SERPL CALC-MCNC: 100 MG/DL
MCH RBC QN AUTO: 29.6 PG (ref 26.5–33)
MCHC RBC AUTO-ENTMCNC: 35.2 G/DL (ref 31.5–36.5)
MCV RBC AUTO: 84 FL (ref 78–100)
NONHDLC SERPL-MCNC: 160 MG/DL
PLATELET # BLD AUTO: 176 10E9/L (ref 150–450)
POTASSIUM SERPL-SCNC: 3.6 MMOL/L (ref 3.4–5.3)
PROT SERPL-MCNC: 8.6 G/DL (ref 6.8–8.8)
RBC # BLD AUTO: 5.5 10E12/L (ref 4.4–5.9)
SODIUM SERPL-SCNC: 135 MMOL/L (ref 133–144)
TRIGL SERPL-MCNC: 299 MG/DL
WBC # BLD AUTO: 8.3 10E9/L (ref 4–11)

## 2020-12-31 PROCEDURE — 80048 BASIC METABOLIC PNL TOTAL CA: CPT | Performed by: NURSE PRACTITIONER

## 2020-12-31 PROCEDURE — 83036 HEMOGLOBIN GLYCOSYLATED A1C: CPT | Performed by: NURSE PRACTITIONER

## 2020-12-31 PROCEDURE — 90686 IIV4 VACC NO PRSV 0.5 ML IM: CPT | Performed by: NURSE PRACTITIONER

## 2020-12-31 PROCEDURE — 85610 PROTHROMBIN TIME: CPT | Performed by: PHYSICIAN ASSISTANT

## 2020-12-31 PROCEDURE — 85027 COMPLETE CBC AUTOMATED: CPT | Performed by: PHYSICIAN ASSISTANT

## 2020-12-31 PROCEDURE — 87517 HEPATITIS B DNA QUANT: CPT | Performed by: PHYSICIAN ASSISTANT

## 2020-12-31 PROCEDURE — 90471 IMMUNIZATION ADMIN: CPT | Performed by: NURSE PRACTITIONER

## 2020-12-31 PROCEDURE — 99214 OFFICE O/P EST MOD 30 MIN: CPT | Mod: 25 | Performed by: NURSE PRACTITIONER

## 2020-12-31 PROCEDURE — 36415 COLL VENOUS BLD VENIPUNCTURE: CPT | Performed by: NURSE PRACTITIONER

## 2020-12-31 PROCEDURE — 80061 LIPID PANEL: CPT | Performed by: NURSE PRACTITIONER

## 2020-12-31 PROCEDURE — 99395 PREV VISIT EST AGE 18-39: CPT | Mod: 25 | Performed by: NURSE PRACTITIONER

## 2020-12-31 PROCEDURE — 80076 HEPATIC FUNCTION PANEL: CPT | Performed by: PHYSICIAN ASSISTANT

## 2020-12-31 RX ORDER — SEMAGLUTIDE 1.34 MG/ML
0.75 INJECTION, SOLUTION SUBCUTANEOUS WEEKLY
Qty: 3 ML | Refills: 1 | Status: SHIPPED | OUTPATIENT
Start: 2020-12-31 | End: 2021-03-24

## 2020-12-31 ASSESSMENT — PAIN SCALES - GENERAL: PAINLEVEL: NO PAIN (0)

## 2020-12-31 ASSESSMENT — MIFFLIN-ST. JEOR: SCORE: 1925.97

## 2020-12-31 NOTE — PROGRESS NOTES
3  SUBJECTIVE:   CC: Ricky Pruitt is an 40 year old male who presents for preventive health visit.       Patient has been advised of split billing requirements and indicates understanding: Yes  Healthy Habits:    Answers for HPI/ROS submitted by the patient on 12/30/2020   Annual Exam:  Frequency of exercise:: 1 day/week  Getting at least 3 servings of Calcium per day:: NO  Diet:: Diabetic, Carbohydrate counting, Gluten-free/reduced  Taking medications regularly:: Yes  Medication side effects:: None  Bi-annual eye exam:: Yes  Dental care twice a year:: NO  Sleep apnea or symptoms of sleep apnea:: None  abdominal pain: No  Blood in stool: No  Blood in urine: No  chest pain: No  chills: No  congestion: No  constipation: No  cough: No  diarrhea: No  dizziness: No  ear pain: No  eye pain: No  nervous/anxious: No  fever: No  frequency: No  genital sores: No  headaches: No  hearing loss: No  heartburn: No  arthralgias: No  joint swelling: No  peripheral edema: No  mood changes: No  myalgias: No  nausea: No  dysuria: No  palpitations: No  Skin sensation changes: No  sore throat: No  urgency: No  rash: No  shortness of breath: No  visual disturbance: No  weakness: No  impotence: No  penile discharge: No  Additional concerns today:: No  Duration of exercise:: N/A      Additional provider notes:     DMT2: checks bs daily in the morning fasting, typically run 115-150. States they have been a little higher since winter because he isn't cycling outside any more. Is not doing any exercise in the house. Taking Ozempic with no problems until 2 weeks ago when he ran out of medication. Requesting refill. Was 205lbs this summer; now 225lbs. States he had referral to diabetes educator in the past but never followed up.     Chronic Hepatitis B: taking daily tenofovir with no complications. Sees Hepatologist who refills medications. Next appointment is next month.     Today's PHQ-2 Score:   PHQ-2 ( 1999 [employer]) 12/31/2020 12/30/2020    Q1: Little interest or pleasure in doing things 0 0   Q2: Feeling down, depressed or hopeless 0 0   PHQ-2 Score 0 0   Q1: Little interest or pleasure in doing things - Not at all   Q2: Feeling down, depressed or hopeless - Not at all   PHQ-2 Score - 0       Abuse: Current or Past(Physical, Sexual or Emotional)- No  Do you feel safe in your environment? Yes        Social History     Tobacco Use     Smoking status: Never Smoker     Smokeless tobacco: Never Used   Substance Use Topics     Alcohol use: No     Alcohol/week: 0.0 standard drinks     If you drink alcohol do you typically have >3 drinks per day or >7 drinks per week? No                      Last PSA: No results found for: PSA    Reviewed orders with patient. Reviewed health maintenance and updated orders accordingly - Yes  Lab work is in process  Labs reviewed in [facility]  Recent Labs   Lab Test 12/31/20  0830 06/17/20  1130 02/29/20  0857 03/24/18  0858 11/21/17  1140   A1C 7.6* 13.1*  --   --   --    LDL  --  143*  --   --  88   HDL  --  46  --   --  37*   TRIG  --  228*  --   --  153*   ALT  --   --  167* 186* 189*   CR  --   --  0.81 0.85  --    GFRESTIMATED  --   --  >90 >90  --    GFRESTBLACK  --   --  >90 >90  --    POTASSIUM  --   --  4.2 3.7  --         Reviewed and updated as needed this visit by clinical staff  Tobacco  Allergies  Meds   Med Hx  Surg Hx  Fam Hx  Soc Hx        Reviewed and updated as needed this visit by Provider                Past Medical History:   Diagnosis Date     Diagnostic skin and sensitization tests (aka ALLERGENS) 5/13/15 skin tests pos. for:  cat/dog/DM/M/T/G/W     Hepatitis B      Seasonal allergic rhinitis     5/13/15 skin tests pos. for:  cat/dog/DM/M/T/G/W      Past Surgical History:   Procedure Laterality Date     APPENDECTOMY  2009       ROS:  CONSTITUTIONAL: NEGATIVE for fever, chills, change in weight  INTEGUMENTARY/SKIN: NEGATIVE for worrisome rashes, moles or lesions  EYES: NEGATIVE for vision changes or  "irritation  ENT: NEGATIVE for ear, mouth and throat problems  RESP: NEGATIVE for significant cough or SOB  CV: NEGATIVE for chest pain, palpitations or peripheral edema  GI: NEGATIVE for nausea, abdominal pain, heartburn, or change in bowel habits   male: negative for dysuria, hematuria, decreased urinary stream, erectile dysfunction, urethral discharge  MUSCULOSKELETAL: NEGATIVE for significant arthralgias or myalgia  NEURO: NEGATIVE for weakness, dizziness or paresthesias  PSYCHIATRIC: NEGATIVE for changes in mood or affect    OBJECTIVE:   /84 (BP Location: Right arm, Patient Position: Chair, Cuff Size: Adult Large)   Pulse 90   Temp 96.7  F (35.9  C) (Tympanic)   Resp 12   Ht 1.753 m (5' 9\")   Wt 102.1 kg (225 lb)   SpO2 97%   BMI 33.23 kg/m    EXAM:  GENERAL: healthy, alert and no distress  EYES: Eyes grossly normal to inspection, PERRL and conjunctivae and sclerae normal  HENT: ear canals and TM's normal, nose and mouth without ulcers or lesions  NECK: no adenopathy, no asymmetry, masses, or scars and thyroid normal to palpation  RESP: lungs clear to auscultation - no rales, rhonchi or wheezes  CV: regular rate and rhythm, normal S1 S2, no S3 or S4, no murmur, click or rub, no peripheral edema and peripheral pulses strong  ABDOMEN: soft, nontender, no hepatosplenomegaly, no masses and bowel sounds normal  MS: no gross musculoskeletal defects noted, no edema  SKIN: no suspicious lesions or rashes, monofilament test done, able to feel 10+ sites bilaterally  NEURO: Normal strength and tone, mentation intact and speech normal  PSYCH: mentation appears normal, affect normal/bright    Diagnostic Test Results:  Labs reviewed in Epic    ASSESSMENT/PLAN:   1. Routine general medical examination at a health care facility  Normal exam today.     2. Type 2 diabetes mellitus without complication, without long-term current use of insulin (H)  Increasing BS at home. Labs ordered; future labs released from PCP. " "Reinforced diet/exercise recommendations. Increased Ozempic to 0.75mg weekly. Recommend follow-up with PCP in 6 months if home BS come down closer to 100 fasting. If they remain higher than that, would recommend recheck in 3 months. Discussed diabetic educator referral. Patient reports he has been working on diet change and will make exercise changes. Will see how he does with medication change and diet/exercise prior to placing referral.     - Hemoglobin A1c  - Semaglutide,0.25 or 0.5MG/DOS, (OZEMPIC, 0.25 OR 0.5 MG/DOSE,) 2 MG/1.5ML SOPN; Inject 0.75 mg Subcutaneous once a week  Dispense: 3 mL; Refill: 1  - Basic metabolic panel  (Ca, Cl, CO2, Creat, Gluc, K, Na, BUN)  - Lipid panel reflex to direct LDL Fasting    3. Viral hepatitis B chronic (H)  Stable, on medication. Has appt with hepatology end of next month.     4. Encounter for diabetic foot exam (H)  Diabetic foot exam normal. Recommend yearly exam in office. Recommended daily home exam of feet and follow-up if he notices any sores or ulcers.    5. Nonalcoholic hepatosteatosis  Labs released from hepatology order (pre hepatology labs).     - INR [NHN5493]  - CBC with platelets [RDH292]  - Hep B Virus DNA Quant Real Time PCR [[id number]]  - Hepatic panel [LAB20]    6. Chronic viral hepatitis B without delta agent and without coma (H)    - INR [LXQ4854]  - CBC with platelets [ASZ112]  - Hep B Virus DNA Quant Real Time PCR [[id number]]  - Hepatic panel [LAB20]    Patient has been advised of split billing requirements and indicates understanding: No  COUNSELING:  Reviewed preventive health counseling, as reflected in patient instructions       Regular exercise       Healthy diet/nutrition    Estimated body mass index is 33.23 kg/m  as calculated from the following:    Height as of this encounter: 1.753 m (5' 9\").    Weight as of this encounter: 102.1 kg (225 lb).    Weight management plan: Discussed healthy diet and exercise guidelines    He reports that he has never " smoked. He has never used smokeless tobacco.      Counseling Resources:  ATP IV Guidelines  Pooled Cohorts Equation Calculator  FRAX Risk Assessment  ICSI Preventive Guidelines  Dietary Guidelines for Americans, 2010  USDA's MyPlate  ASA Prophylaxis  Lung CA Screening    MARKOS Roe CNP  [facility]

## 2020-12-31 NOTE — PATIENT INSTRUCTIONS
Labs ordered.     Ozempic refilled - increased dose to 0.75mg per week.       Preventive Health Recommendations  Male Ages 26 - 39    Yearly exam:             See your health care provider every year in order to  o   Review health changes.   o   Discuss preventive care.    o   Review your medicines if your doctor has prescribed any.    You should be tested each year for STDs (sexually transmitted diseases), if you re at risk.     After age 35, talk to your provider about cholesterol testing. If you are at risk for heart disease, have your cholesterol tested at least every 5 years.     If you are at risk for diabetes, you should have a diabetes test (fasting glucose).  Shots: Get a flu shot each year. Get a tetanus shot every 10 years.     Nutrition:    Eat at least 5 servings of fruits and vegetables daily.     Eat whole-grain bread, whole-wheat pasta and brown rice instead of white grains and rice.     Get adequate Calcium and Vitamin D.     Lifestyle    Exercise for at least 150 minutes a week (30 minutes a day, 5 days a week). This will help you control your weight and prevent disease.     Limit alcohol to one drink per day.     No smoking.     Wear sunscreen to prevent skin cancer.     See your dentist every six months for an exam and cleaning.

## 2021-01-05 LAB
HBV DNA SERPL NAA+PROBE-ACNC: 40 [IU]/ML
HBV DNA SERPL NAA+PROBE-LOG IU: 1.6 {LOG_IU}/ML

## 2021-01-28 ENCOUNTER — VIRTUAL VISIT (OUTPATIENT)
Dept: GASTROENTEROLOGY | Facility: CLINIC | Age: 41
End: 2021-01-28
Attending: PHYSICIAN ASSISTANT
Payer: COMMERCIAL

## 2021-01-28 DIAGNOSIS — B18.1 VIRAL HEPATITIS B CHRONIC (H): ICD-10-CM

## 2021-01-28 PROCEDURE — 99213 OFFICE O/P EST LOW 20 MIN: CPT | Mod: TEL | Performed by: PHYSICIAN ASSISTANT

## 2021-01-28 RX ORDER — TENOFOVIR DISOPROXIL FUMARATE 300 MG/1
300 TABLET, FILM COATED ORAL DAILY
Qty: 90 TABLET | Refills: 0 | Status: SHIPPED | OUTPATIENT
Start: 2021-01-28 | End: 2021-01-29

## 2021-01-28 ASSESSMENT — PAIN SCALES - GENERAL: PAINLEVEL: NO PAIN (0)

## 2021-01-28 NOTE — LETTER
1/28/2021         RE: Ricky Pruitt  [address] [address]        Dear Colleague,    Thank you for referring your patient, Ricky Pruitt, to the [facility] [place]. Please see a copy of my visit note below.    Ricky is a 40 year old who is being evaluated via a billable telephone visit.      What phone number would you like to be contacted at? [phone]  How would you like to obtain your AVS? MyChart  Phone call duration: 12 minutes      Hepatology Follow-up Clinic note  Ricky Priutt   Date of Birth 1980  Date of Service 1/28/2021    Reason for follow-up:          Assessment/plan:   Ricky Pruitt is a 40 year old male with history of biopsy proven ARAUJO and chronic hepatitis B, E antigen positive and E antibody negative (2015), who has been maintained on tenofovir DF.  His HBV DNA level remains low and he has been compliant with medication.  His transaminases remain mildly elevated.  He has made lifestyle changes after diagnosis with diabetes this summer, hemoglobin A1c going from 13.1 to 7.6.  He was congratulated on his efforts and again reiterated the importance of blood glucose optimization for fatty liver disease.    -Continue 300 mg tenofovir DF  -Continue slow gradual weight loss  -Continue limiting carbohydrates in the diet  -Continue optimization of blood glucose and cholesterol.  Okay to be on a statin from a hepatology standpoint.  -Repeat fibrosis scan in 1 year  -Follow-up in clinic in 1 year    Shameka Sosa PA-C   [facility] Hepatology clinic    -----------------------------------------------------       HPI:   Ricky Pruitt is a 40 year old male presenting for follow-up.     Hepatitis B:   E antigen: 8/22/2015  E antibody: 8/22/2015  -Diagnosed 2011   -Prior biopsy: 10/15/2015, see below  -Prior treatments: on tenofovir since 2010    Patient was last seen in clinic by me on March 4, 2020.  No recent hospitalizations or ER visits.   Patient was diagnosed with diabetes in June 2020, with a hemoglobin A1c of 13.1.  He was started on Ozempic.  Most recent hemoglobin A1c was 7.6.    Appetite is down a bit. Over the past six months, no longer eating white rice. Doing a lot of cycling, down to 205 lbs. Weight up again during the winter due to decreased physical activity.    He is having regular bowel movements.    Patient denies jaundice, lower extremity edema, abdominal distension or confusion.   Patient also denies melena, hematochezia or hematemesis. Patient denies weight loss, fevers, sweats or chills.    He continues to work at the [employer]. HE does not drink alcohol.     Medical hx Surgical hx   Past Medical History:   Diagnosis Date     Diagnostic skin and sensitization tests (aka ALLERGENS) 5/13/15 skin tests pos. for:  cat/dog/DM/M/T/G/W     Hepatitis B      Seasonal allergic rhinitis     Past Surgical History:   Procedure Laterality Date     APPENDECTOMY  2009                 Medications:     Current Outpatient Medications   Medication     alcohol swab prep pads     blood glucose (NO BRAND SPECIFIED) test strip     ketoconazole (NIZORAL) 2 % external cream     Semaglutide,0.25 or 0.5MG/DOS, (OZEMPIC, 0.25 OR 0.5 MG/DOSE,) 2 MG/1.5ML SOPN     tenofovir (VIREAD) 300 MG tablet     thin (NO BRAND SPECIFIED) lancets     No current facility-administered medications for this visit.             Allergies:   No Known Allergies         Review of Systems:   10 points ROS was obtained and highlighted in the HPI, otherwise negative.          Physical Exam:     PSYCH: Alert and oriented times 3; coherent speech, normal   rate and volume, able to articulate logical thoughts, able   to abstract reason, no tangential thoughts, no hallucinations   or delusions  His affect is normal  RESP: No cough, no audible wheezing, able to talk in full sentences  Remainder of exam unable to be completed due to telephone visits         Data:   Reviewed in person and  significant for:    Lab Results   Component Value Date     12/31/2020      Lab Results   Component Value Date    POTASSIUM 3.6 12/31/2020     Lab Results   Component Value Date    CHLORIDE 103 12/31/2020     Lab Results   Component Value Date    CO2 26 12/31/2020     Lab Results   Component Value Date    BUN 18 12/31/2020     Lab Results   Component Value Date    CR 0.79 12/31/2020       Lab Results   Component Value Date    WBC 8.3 12/31/2020     Lab Results   Component Value Date    HGB 16.3 12/31/2020     Lab Results   Component Value Date    HCT 46.3 12/31/2020     Lab Results   Component Value Date    MCV 84 12/31/2020     Lab Results   Component Value Date     12/31/2020       Lab Results   Component Value Date    AST 51 12/31/2020     Lab Results   Component Value Date     12/31/2020     Lab Results   Component Value Date    BILICONJ 0.0 05/06/2014      Lab Results   Component Value Date    BILITOTAL 1.4 12/31/2020       Lab Results   Component Value Date    ALBUMIN 3.6 12/31/2020     Lab Results   Component Value Date    PROTTOTAL 8.6 12/31/2020      Lab Results   Component Value Date    ALKPHOS 126 12/31/2020       Lab Results   Component Value Date    INR 1.01 12/31/2020             Again, thank you for allowing me to participate in the care of your patient.        Sincerely,        Shameka Sosa PA-C

## 2021-01-28 NOTE — PROGRESS NOTES
Ricky is a 40 year old who is being evaluated via a billable telephone visit.      What phone number would you like to be contacted at? [phone]  How would you like to obtain your AVS? Sarah  Phone call duration: 12 minutes      Hepatology Follow-up Clinic note  Ricky Pruitt   Date of Birth 1980  Date of Service 1/28/2021    Reason for follow-up:          Assessment/plan:   Ricky Pruitt is a 40 year old male with history of biopsy proven ARAUJO and chronic hepatitis B, E antigen positive and E antibody negative (2015), who has been maintained on tenofovir DF.  His HBV DNA level remains low and he has been compliant with medication.  His transaminases remain mildly elevated.  He has made lifestyle changes after diagnosis with diabetes this summer, hemoglobin A1c going from 13.1 to 7.6.  He was congratulated on his efforts and again reiterated the importance of blood glucose optimization for fatty liver disease.    -Continue 300 mg tenofovir DF  -Continue slow gradual weight loss  -Continue limiting carbohydrates in the diet  -Continue optimization of blood glucose and cholesterol.  Okay to be on a statin from a hepatology standpoint.  -Repeat fibrosis scan in 1 year  -Follow-up in clinic in 1 year    Shameka Sosa PA-C   [facility] Hepatology clinic    -----------------------------------------------------       HPI:   Ricky Pruitt is a 40 year old male presenting for follow-up.     Hepatitis B:   E antigen: 8/22/2015  E antibody: 8/22/2015  -Diagnosed 2011   -Prior biopsy: 10/15/2015, see below  -Prior treatments: on tenofovir since 2010    Patient was last seen in clinic by me on March 4, 2020.  No recent hospitalizations or ER visits.  Patient was diagnosed with diabetes in June 2020, with a hemoglobin A1c of 13.1.  He was started on Ozempic.  Most recent hemoglobin A1c was 7.6.    Appetite is down a bit. Over the past six months, no longer eating white rice. Doing a lot of cycling, down to 205  lbs. Weight up again during the winter due to decreased physical activity.    He is having regular bowel movements.    Patient denies jaundice, lower extremity edema, abdominal distension or confusion.   Patient also denies melena, hematochezia or hematemesis. Patient denies weight loss, fevers, sweats or chills.    He continues to work at the [employer]. HE does not drink alcohol.     Medical hx Surgical hx   Past Medical History:   Diagnosis Date     Diagnostic skin and sensitization tests (aka ALLERGENS) 5/13/15 skin tests pos. for:  cat/dog/DM/M/T/G/W     Hepatitis B      Seasonal allergic rhinitis     Past Surgical History:   Procedure Laterality Date     APPENDECTOMY  2009                 Medications:     Current Outpatient Medications   Medication     alcohol swab prep pads     blood glucose (NO BRAND SPECIFIED) test strip     ketoconazole (NIZORAL) 2 % external cream     Semaglutide,0.25 or 0.5MG/DOS, (OZEMPIC, 0.25 OR 0.5 MG/DOSE,) 2 MG/1.5ML SOPN     tenofovir (VIREAD) 300 MG tablet     thin (NO BRAND SPECIFIED) lancets     No current facility-administered medications for this visit.             Allergies:   No Known Allergies         Review of Systems:   10 points ROS was obtained and highlighted in the HPI, otherwise negative.          Physical Exam:     PSYCH: Alert and oriented times 3; coherent speech, normal   rate and volume, able to articulate logical thoughts, able   to abstract reason, no tangential thoughts, no hallucinations   or delusions  His affect is normal  RESP: No cough, no audible wheezing, able to talk in full sentences  Remainder of exam unable to be completed due to telephone visits         Data:   Reviewed in person and significant for:    Lab Results   Component Value Date     12/31/2020      Lab Results   Component Value Date    POTASSIUM 3.6 12/31/2020     Lab Results   Component Value Date    CHLORIDE 103 12/31/2020     Lab Results   Component Value Date    CO2 26 12/31/2020      Lab Results   Component Value Date    BUN 18 12/31/2020     Lab Results   Component Value Date    CR 0.79 12/31/2020       Lab Results   Component Value Date    WBC 8.3 12/31/2020     Lab Results   Component Value Date    HGB 16.3 12/31/2020     Lab Results   Component Value Date    HCT 46.3 12/31/2020     Lab Results   Component Value Date    MCV 84 12/31/2020     Lab Results   Component Value Date     12/31/2020       Lab Results   Component Value Date    AST 51 12/31/2020     Lab Results   Component Value Date     12/31/2020     Lab Results   Component Value Date    BILICONJ 0.0 05/06/2014      Lab Results   Component Value Date    BILITOTAL 1.4 12/31/2020       Lab Results   Component Value Date    ALBUMIN 3.6 12/31/2020     Lab Results   Component Value Date    PROTTOTAL 8.6 12/31/2020      Lab Results   Component Value Date    ALKPHOS 126 12/31/2020       Lab Results   Component Value Date    INR 1.01 12/31/2020

## 2021-01-29 RX ORDER — TENOFOVIR DISOPROXIL FUMARATE 300 MG/1
300 TABLET, FILM COATED ORAL DAILY
Qty: 90 TABLET | Refills: 3 | Status: SHIPPED | OUTPATIENT
Start: 2021-01-29 | End: 2022-01-13

## 2021-02-04 ENCOUNTER — TELEPHONE (OUTPATIENT)
Dept: GASTROENTEROLOGY | Facility: CLINIC | Age: 41
End: 2021-02-04

## 2021-03-24 ENCOUNTER — IMMUNIZATION (OUTPATIENT)
Dept: NURSING | Facility: CLINIC | Age: 41
End: 2021-03-24
Payer: COMMERCIAL

## 2021-03-24 DIAGNOSIS — E11.9 TYPE 2 DIABETES MELLITUS WITHOUT COMPLICATION, WITHOUT LONG-TERM CURRENT USE OF INSULIN (H): ICD-10-CM

## 2021-03-24 PROCEDURE — 91300 PR COVID VAC PFIZER DIL RECON 30 MCG/0.3 ML IM: CPT

## 2021-03-24 PROCEDURE — 0001A PR COVID VAC PFIZER DIL RECON 30 MCG/0.3 ML IM: CPT

## 2021-03-24 RX ORDER — SEMAGLUTIDE 1.34 MG/ML
INJECTION, SOLUTION SUBCUTANEOUS
Qty: 3 ML | Refills: 1 | Status: SHIPPED | OUTPATIENT
Start: 2021-03-24 | End: 2021-05-21

## 2021-04-03 ENCOUNTER — HEALTH MAINTENANCE LETTER (OUTPATIENT)
Age: 41
End: 2021-04-03

## 2021-04-14 ENCOUNTER — IMMUNIZATION (OUTPATIENT)
Dept: NURSING | Facility: CLINIC | Age: 41
End: 2021-04-14
Attending: INTERNAL MEDICINE
Payer: COMMERCIAL

## 2021-04-14 PROCEDURE — 0002A PR COVID VAC PFIZER DIL RECON 30 MCG/0.3 ML IM: CPT

## 2021-04-14 PROCEDURE — 91300 PR COVID VAC PFIZER DIL RECON 30 MCG/0.3 ML IM: CPT

## 2021-05-19 ENCOUNTER — TELEPHONE (OUTPATIENT)
Dept: FAMILY MEDICINE | Facility: CLINIC | Age: 41
End: 2021-05-19

## 2021-05-19 DIAGNOSIS — E11.9 TYPE 2 DIABETES MELLITUS WITHOUT COMPLICATION, WITHOUT LONG-TERM CURRENT USE OF INSULIN (H): ICD-10-CM

## 2021-05-21 RX ORDER — SEMAGLUTIDE 1.34 MG/ML
INJECTION, SOLUTION SUBCUTANEOUS
Qty: 3 ML | Refills: 0 | Status: SHIPPED | OUTPATIENT
Start: 2021-05-21 | End: 2021-10-14

## 2021-05-21 NOTE — TELEPHONE ENCOUNTER
One refill sent, please advise him to scheduled diabetes check appointment next month with lab draw beforehand.    Thanks,  Bashir Munson MD

## 2021-05-21 NOTE — TELEPHONE ENCOUNTER
Routing refill request to provider for review/approval because:  Last ordered by alysa ANDREA RN, BSN

## 2021-05-26 NOTE — TELEPHONE ENCOUNTER
Attempted to reach pt but no answer.    Pt is active in his [employer] account.    Reminder message sent to pt.    Abigail Bui RN

## 2021-06-01 ENCOUNTER — RECORDS - HEALTHEAST (OUTPATIENT)
Dept: ADMINISTRATIVE | Facility: CLINIC | Age: 41
End: 2021-06-01

## 2021-07-01 ENCOUNTER — TRANSFERRED RECORDS (OUTPATIENT)
Dept: HEALTH INFORMATION MANAGEMENT | Facility: CLINIC | Age: 41
End: 2021-07-01

## 2021-07-01 LAB — RETINOPATHY: NORMAL

## 2021-07-24 ENCOUNTER — HEALTH MAINTENANCE LETTER (OUTPATIENT)
Age: 41
End: 2021-07-24

## 2021-08-24 ENCOUNTER — LAB (OUTPATIENT)
Dept: LAB | Facility: CLINIC | Age: 41
End: 2021-08-24
Payer: COMMERCIAL

## 2021-08-24 DIAGNOSIS — B18.1 VIRAL HEPATITIS B CHRONIC (H): ICD-10-CM

## 2021-08-24 DIAGNOSIS — E11.9 TYPE 2 DIABETES MELLITUS WITHOUT COMPLICATION, WITHOUT LONG-TERM CURRENT USE OF INSULIN (H): ICD-10-CM

## 2021-08-24 LAB
ALBUMIN SERPL-MCNC: 3.9 G/DL (ref 3.4–5)
ALP SERPL-CCNC: 113 U/L (ref 40–150)
ALT SERPL W P-5'-P-CCNC: 108 U/L (ref 0–70)
ANION GAP SERPL CALCULATED.3IONS-SCNC: 5 MMOL/L (ref 3–14)
AST SERPL W P-5'-P-CCNC: 65 U/L (ref 0–45)
BILIRUB DIRECT SERPL-MCNC: 0.3 MG/DL (ref 0–0.2)
BILIRUB SERPL-MCNC: 1.4 MG/DL (ref 0.2–1.3)
BUN SERPL-MCNC: 14 MG/DL (ref 7–30)
CALCIUM SERPL-MCNC: 9.6 MG/DL (ref 8.5–10.1)
CHLORIDE BLD-SCNC: 102 MMOL/L (ref 94–109)
CO2 SERPL-SCNC: 28 MMOL/L (ref 20–32)
CREAT SERPL-MCNC: 0.8 MG/DL (ref 0.66–1.25)
CREAT UR-MCNC: 30 MG/DL
ERYTHROCYTE [DISTWIDTH] IN BLOOD BY AUTOMATED COUNT: 13.2 % (ref 10–15)
GFR SERPL CREATININE-BSD FRML MDRD: >90 ML/MIN/1.73M2
GLUCOSE BLD-MCNC: 115 MG/DL (ref 70–99)
HBA1C MFR BLD: 7 % (ref 0–5.6)
HCT VFR BLD AUTO: 47.1 % (ref 40–53)
HGB BLD-MCNC: 16.6 G/DL (ref 13.3–17.7)
INR PPP: 1 (ref 0.85–1.15)
MCH RBC QN AUTO: 29.5 PG (ref 26.5–33)
MCHC RBC AUTO-ENTMCNC: 35.2 G/DL (ref 31.5–36.5)
MCV RBC AUTO: 84 FL (ref 78–100)
MICROALBUMIN UR-MCNC: 7 MG/L
MICROALBUMIN/CREAT UR: 23.33 MG/G CR (ref 0–17)
PLATELET # BLD AUTO: 177 10E3/UL (ref 150–450)
POTASSIUM BLD-SCNC: 3.8 MMOL/L (ref 3.4–5.3)
PROT SERPL-MCNC: 9 G/DL (ref 6.8–8.8)
RBC # BLD AUTO: 5.63 10E6/UL (ref 4.4–5.9)
SODIUM SERPL-SCNC: 135 MMOL/L (ref 133–144)
WBC # BLD AUTO: 8.6 10E3/UL (ref 4–11)

## 2021-08-24 PROCEDURE — 82043 UR ALBUMIN QUANTITATIVE: CPT

## 2021-08-24 PROCEDURE — 36415 COLL VENOUS BLD VENIPUNCTURE: CPT

## 2021-08-24 PROCEDURE — 85610 PROTHROMBIN TIME: CPT

## 2021-08-24 PROCEDURE — 80053 COMPREHEN METABOLIC PANEL: CPT

## 2021-08-24 PROCEDURE — 83036 HEMOGLOBIN GLYCOSYLATED A1C: CPT

## 2021-08-24 PROCEDURE — 87517 HEPATITIS B DNA QUANT: CPT

## 2021-08-24 PROCEDURE — 82248 BILIRUBIN DIRECT: CPT

## 2021-08-24 PROCEDURE — 85027 COMPLETE CBC AUTOMATED: CPT

## 2021-08-25 DIAGNOSIS — K76.0 NONALCOHOLIC HEPATOSTEATOSIS: Primary | ICD-10-CM

## 2021-08-25 DIAGNOSIS — B18.1 VIRAL HEPATITIS B CHRONIC (H): ICD-10-CM

## 2021-08-26 ENCOUNTER — TELEPHONE (OUTPATIENT)
Dept: GASTROENTEROLOGY | Facility: CLINIC | Age: 41
End: 2021-08-26

## 2021-08-27 LAB — HBV DNA SERPL NAA+PROBE-ACNC: NOT DETECTED IU/ML

## 2021-09-18 ENCOUNTER — HEALTH MAINTENANCE LETTER (OUTPATIENT)
Age: 41
End: 2021-09-18

## 2021-10-12 DIAGNOSIS — E11.9 TYPE 2 DIABETES MELLITUS WITHOUT COMPLICATION, WITHOUT LONG-TERM CURRENT USE OF INSULIN (H): ICD-10-CM

## 2021-10-14 RX ORDER — SEMAGLUTIDE 1.34 MG/ML
INJECTION, SOLUTION SUBCUTANEOUS
Qty: 3 ML | Refills: 0 | Status: SHIPPED | OUTPATIENT
Start: 2021-10-14 | End: 2021-10-27

## 2021-10-14 NOTE — TELEPHONE ENCOUNTER
"Requested Prescriptions   Pending Prescriptions Disp Refills     OZEMPIC (0.25 OR 0.5 MG/DOSE) 2 MG/1.5ML SOPN pen [Pharmacy Med Name: OZEMPIC 0.25 OR 0.5MG/DOSE (1 PEN/BOX)] 3 mL 0     Sig: INJECT 0.75 MG SUBCUTANEOUS ONCE A WEEK (TWO SEPARATE INJECTIONS - ONE OF  0.25MG AND ONE OF 0.5MG)       GLP-1 Agonists Protocol Failed - 10/12/2021 10:42 AM        Failed - Recent (6 mo) or future (30 days) visit within the authorizing provider's specialty     Patient had office visit in the last 6 months or has a visit in the next 30 days with authorizing provider.  See \"Patient Info\" tab in inbasket, or \"Choose Columns\" in Meds & Orders section of the refill encounter.            Passed - HgbA1C in past 3 or 6 months     If HgbA1C is 8 or greater, it needs to be on file within the past 3 months.  If less than 8, must be on file within the past 6 months.     Recent Labs   Lab Test 08/24/21  1526   A1C 7.0*             Passed - Medication is active on med list        Passed - Patient is age 18 or older        Passed - Normal serum creatinine on file in past 12 months     Recent Labs   Lab Test 08/24/21  1526   CR 0.80       Ok to refill medication if creatinine is low               "

## 2021-10-14 NOTE — TELEPHONE ENCOUNTER
One refill sent.  In person visit recommended so foot exam can be done.    Thanks,  Bashir Munson MD

## 2021-10-22 ASSESSMENT — ENCOUNTER SYMPTOMS
ABDOMINAL PAIN: 0
DYSURIA: 0
NERVOUS/ANXIOUS: 0
PALPITATIONS: 0
PARESTHESIAS: 0
CONSTIPATION: 0
COUGH: 0
DIARRHEA: 0
HEARTBURN: 0
CHILLS: 0
SHORTNESS OF BREATH: 0
SORE THROAT: 0
MYALGIAS: 0
FEVER: 0
HEADACHES: 0
ARTHRALGIAS: 0
DIZZINESS: 0
HEMATOCHEZIA: 0
WEAKNESS: 0
FREQUENCY: 0
EYE PAIN: 0
NAUSEA: 0
JOINT SWELLING: 0
HEMATURIA: 0

## 2021-10-27 ENCOUNTER — OFFICE VISIT (OUTPATIENT)
Dept: FAMILY MEDICINE | Facility: CLINIC | Age: 41
End: 2021-10-27
Payer: COMMERCIAL

## 2021-10-27 VITALS
RESPIRATION RATE: 22 BRPM | OXYGEN SATURATION: 95 % | HEIGHT: 69 IN | TEMPERATURE: 97.3 F | SYSTOLIC BLOOD PRESSURE: 108 MMHG | WEIGHT: 225 LBS | DIASTOLIC BLOOD PRESSURE: 60 MMHG | HEART RATE: 106 BPM | BODY MASS INDEX: 33.33 KG/M2

## 2021-10-27 DIAGNOSIS — Z00.00 ROUTINE HEALTH MAINTENANCE: Primary | ICD-10-CM

## 2021-10-27 DIAGNOSIS — Z23 NEED FOR PROPHYLACTIC VACCINATION AND INOCULATION AGAINST INFLUENZA: ICD-10-CM

## 2021-10-27 DIAGNOSIS — E11.9 TYPE 2 DIABETES MELLITUS WITHOUT COMPLICATION, WITHOUT LONG-TERM CURRENT USE OF INSULIN (H): ICD-10-CM

## 2021-10-27 DIAGNOSIS — Z23 NEED FOR VACCINATION: ICD-10-CM

## 2021-10-27 PROCEDURE — 99213 OFFICE O/P EST LOW 20 MIN: CPT | Mod: 25 | Performed by: INTERNAL MEDICINE

## 2021-10-27 PROCEDURE — 99396 PREV VISIT EST AGE 40-64: CPT | Mod: 25 | Performed by: INTERNAL MEDICINE

## 2021-10-27 PROCEDURE — 90686 IIV4 VACC NO PRSV 0.5 ML IM: CPT | Performed by: INTERNAL MEDICINE

## 2021-10-27 PROCEDURE — 90471 IMMUNIZATION ADMIN: CPT | Performed by: INTERNAL MEDICINE

## 2021-10-27 PROCEDURE — 90732 PPSV23 VACC 2 YRS+ SUBQ/IM: CPT | Performed by: INTERNAL MEDICINE

## 2021-10-27 PROCEDURE — 99207 PR FOOT EXAM NO CHARGE: CPT | Mod: 25 | Performed by: INTERNAL MEDICINE

## 2021-10-27 PROCEDURE — 90715 TDAP VACCINE 7 YRS/> IM: CPT | Performed by: INTERNAL MEDICINE

## 2021-10-27 PROCEDURE — 90472 IMMUNIZATION ADMIN EACH ADD: CPT | Performed by: INTERNAL MEDICINE

## 2021-10-27 RX ORDER — SEMAGLUTIDE 1.34 MG/ML
INJECTION, SOLUTION SUBCUTANEOUS
Qty: 6 ML | Refills: 3 | Status: SHIPPED | OUTPATIENT
Start: 2021-10-27 | End: 2023-02-10

## 2021-10-27 RX ORDER — GLUCOSAMINE HCL/CHONDROITIN SU 500-400 MG
CAPSULE ORAL
Qty: 100 EACH | Refills: 3 | Status: SHIPPED | OUTPATIENT
Start: 2021-10-27 | End: 2024-05-29

## 2021-10-27 RX ORDER — LANCETS
EACH MISCELLANEOUS
Qty: 100 EACH | Refills: 3 | Status: SHIPPED | OUTPATIENT
Start: 2021-10-27 | End: 2024-05-29

## 2021-10-27 ASSESSMENT — ENCOUNTER SYMPTOMS
PALPITATIONS: 0
NAUSEA: 0
FREQUENCY: 0
MYALGIAS: 0
DIZZINESS: 0
HEMATURIA: 0
NERVOUS/ANXIOUS: 0
HEMATOCHEZIA: 0
COUGH: 0
SORE THROAT: 0
DYSURIA: 0
CHILLS: 0
ARTHRALGIAS: 0
EYE PAIN: 0
WEAKNESS: 0
DIARRHEA: 0
HEADACHES: 0
PARESTHESIAS: 0
ABDOMINAL PAIN: 0
CONSTIPATION: 0
JOINT SWELLING: 0
HEARTBURN: 0
FEVER: 0
SHORTNESS OF BREATH: 0

## 2021-10-27 ASSESSMENT — MIFFLIN-ST. JEOR: SCORE: 1924.54

## 2021-10-27 NOTE — PROGRESS NOTES
SUBJECTIVE:   CC: Ricky Pruitt is an 41 year old male who presents for preventative health visit.       Patient has been advised of split billing requirements and indicates understanding: Yes  Healthy Habits:     Getting at least 3 servings of Calcium per day:  Yes    Bi-annual eye exam:  Yes    Dental care twice a year:  Yes    Sleep apnea or symptoms of sleep apnea:  None    Diet:  Low salt, Diabetic, Carbohydrate counting and Gluten-free/reduced    Frequency of exercise:  1 day/week    Duration of exercise:  Less than 15 minutes    Taking medications regularly:  Yes    Medication side effects:  None    PHQ-2 Total Score: 0    Additional concerns today:  No      Diabetes Follow-up    How often are you checking your blood sugar? One time daily, 110-120  What time of day are you checking your blood sugars (select all that apply)?  Before meals  Have you had any blood sugars above 200?  No  Have you had any blood sugars below 70?  No    What concerns do you have today about your diabetes? None     Do you have any of these symptoms? (Select all that apply)  No numbness or tingling in feet.  No redness, sores or blisters on feet.  No complaints of excessive thirst.  No reports of blurry vision.  No significant changes to weight.    Have you had a diabetic eye exam in the last 12 months? Yes- Date of last eye exam: July 2021,  Location: [facility]     Cut white rice out of his diet and reduced sandwiches    Dry spot on the inside of the R ear for about 1.5 weeks, no itch or pain.      Has a strong family history of CAD.  He had a coronary calcium scan in June with a very low score of 2.        BP Readings from Last 2 Encounters:   10/27/21 108/60   12/31/20 115/84     Hemoglobin A1C (%)   Date Value   08/24/2021 7.0 (H)   12/31/2020 7.6 (H)   06/17/2020 13.1 (H)     LDL Cholesterol Calculated (mg/dL)   Date Value   12/31/2020 100 (H)   06/17/2020 143 (H)         Today's PHQ-2 Score:   PHQ-2 ( 1999 [employer])  10/22/2021   Q1: Little interest or pleasure in doing things 0   Q2: Feeling down, depressed or hopeless 0   PHQ-2 Score 0   Q1: Little interest or pleasure in doing things Not at all   Q2: Feeling down, depressed or hopeless Not at all   PHQ-2 Score 0       Abuse: Current or Past(Physical, Sexual or Emotional)- No  Do you feel safe in your environment? Yes    Have you ever done Advance Care Planning? (For example, a Health Directive, POLST, or a discussion with a medical provider or your loved ones about your wishes): No, advance care planning information given to patient to review.  Patient declined advance care planning discussion at this time.    Social History     Tobacco Use     Smoking status: Never Smoker     Smokeless tobacco: Never Used   Substance Use Topics     Alcohol use: No     Alcohol/week: 0.0 standard drinks     If you drink alcohol do you typically have >3 drinks per day or >7 drinks per week? No    Alcohol Use 10/22/2021   Prescreen: >3 drinks/day or >7 drinks/week? Not Applicable       Last PSA: No results found for: PSA    Reviewed orders with patient. Reviewed health maintenance and updated orders accordingly - Yes      Reviewed and updated as needed this visit by clinical staff  Tobacco  Allergies  Meds   Med Hx  Surg Hx  Fam Hx          Reviewed and updated as needed this visit by Provider                    Review of Systems   Constitutional: Negative for chills and fever.   HENT: Negative for congestion, ear pain, hearing loss and sore throat.    Eyes: Negative for pain and visual disturbance.   Respiratory: Negative for cough and shortness of breath.    Cardiovascular: Negative for chest pain, palpitations and peripheral edema.   Gastrointestinal: Negative for abdominal pain, constipation, diarrhea, heartburn, hematochezia and nausea.   Genitourinary: Negative for discharge, dysuria, frequency, genital sores, hematuria, impotence and urgency.   Musculoskeletal: Negative for  "arthralgias, joint swelling and myalgias.   Skin: Negative for rash.   Neurological: Negative for dizziness, weakness, headaches and paresthesias.   Psychiatric/Behavioral: Negative for mood changes. The patient is not nervous/anxious.          OBJECTIVE:   /60 (BP Location: Right arm, Patient Position: Sitting, Cuff Size: Adult Large)   Pulse 106   Temp 97.3  F (36.3  C) (Tympanic)   Resp 22   Ht 1.758 m (5' 9.23\")   Wt 102.1 kg (225 lb)   SpO2 95%   BMI 33.01 kg/m      Physical Exam  GENERAL: healthy, alert and no distress  EYES: Eyes grossly normal to inspection, PERRL and conjunctivae and sclerae normal  HENT: ear canals and TM's normal, nose and mouth without ulcers or lesions  NECK: no adenopathy, no asymmetry, masses, or scars and thyroid normal to palpation  RESP: lungs clear to auscultation - no rales, rhonchi or wheezes  CV: regular rate and rhythm, normal S1 S2, no S3 or S4, no murmur, click or rub, no peripheral edema and peripheral pulses strong  ABDOMEN: soft, nontender, no hepatosplenomegaly, no masses and bowel sounds normal  MS: no gross musculoskeletal defects noted, no edema  SKIN: no suspicious lesions or rashes  NEURO: Normal strength and tone, mentation intact and speech normal  PSYCH: mentation appears normal, affect normal/bright  Diabetic foot exam: normal DP and PT pulses, no trophic changes or ulcerative lesions and normal monofilament exam      ASSESSMENT/PLAN:   (Z00.00) Routine health maintenance  (primary encounter diagnosis)      Immunizations: Pneumovax, tetanus, and flu vax given today    Lab Studies: UTD      (E11.9) Type 2 diabetes mellitus without complication, without long-term current use of insulin (H)  Comment: Last A1C well controlled at 7 in August and home blood sugars remain good at 110-120.  Normal foot exam today.  No retinopathy on eye exam in July    Discussed recommendation for statin in patients over 40 with diabetes.  He has strong FHx of CAD but " "recently had a coronary artery calcium scan with a very low score, which is reassuring.  He would like to think further about starting a statin, info provided.    Recheck A1C and other labs in 4 months.     Plan: Albumin Random Urine Quantitative with Creat         Ratio, FOOT EXAM, semaglutide (OZEMPIC, 0.25 OR        0.5 MG/DOSE,) 2 MG/1.5ML SOPN pen, blood         glucose (NO BRAND SPECIFIED) test strip, thin         (NO BRAND SPECIFIED) lancets, alcohol swab prep        pads, **A1C FUTURE 3mo, Lipid panel reflex to         direct LDL Fasting            (Z23) Need for vaccination  Comment:   Plan: Pneumococcal vaccine 23 valent PPSV23          (Pneumovax) [54119], TDAP VACCINE (Adacel,         Boostrix)  [9408610]            (Z23) Need for prophylactic vaccination and inoculation against influenza  Comment:   Plan: INFLUENZA VACCINE IM > 6 MONTHS VALENT IIV4         (AFLURIA/FLUZONE)            COUNSELING:   Reviewed preventive health counseling, as reflected in patient instructions    Estimated body mass index is 33.01 kg/m  as calculated from the following:    Height as of this encounter: 1.758 m (5' 9.23\").    Weight as of this encounter: 102.1 kg (225 lb).     Weight management plan: low carb diet    He reports that he has never smoked. He has never used smokeless tobacco.      Counseling Resources:  ATP IV Guidelines  Pooled Cohorts Equation Calculator  FRAX Risk Assessment  ICSI Preventive Guidelines  Dietary Guidelines for Americans, 2010  USDA's MyPlate  ASA Prophylaxis  Lung CA Screening    Bashir Munson MD  [facility]  "

## 2021-10-27 NOTE — PATIENT INSTRUCTIONS
Patient Education     Atorvastatin Calcium Oral tablet  What is this medicine?  ATORVASTATIN (a TORE va sta tin) is known as a HMG-CoA reductase inhibitor or 'statin'. It lowers the level of cholesterol and triglycerides in the blood. This drug may also reduce the risk of heart attack, stroke, or other health problems in patients with risk factors for heart disease. Diet and lifestyle changes are often used with this drug.  This medicine may be used for other purposes; ask your health care provider or pharmacist if you have questions.  What should I tell my health care provider before I take this medicine?  They need to know if you have any of these conditions:    frequently drink alcoholic beverages    history of stroke, TIA    kidney disease    liver disease    muscle aches or weakness    other medical condition    an unusual or allergic reaction to atorvastatin, other medicines, foods, dyes, or preservatives    pregnant or trying to get pregnant    breast-feeding  How should I use this medicine?  Take this medicine by mouth with a glass of water. Follow the directions on the prescription label. You can take this medicine with or without food. Take your doses at regular intervals. Do not take your medicine more often than directed.  Talk to your pediatrician regarding the use of this medicine in children. While this drug may be prescribed for children as young as 10 years old for selected conditions, precautions do apply.  Overdosage: If you think you have taken too much of this medicine contact a poison control center or emergency room at once.  NOTE: This medicine is only for you. Do not share this medicine with others.  What if I miss a dose?  If you miss a dose, take it as soon as you can. If it is almost time for your next dose, take only that dose. Do not take double or extra doses.  What may interact with this medicine?  Do not take this medicine with any of the following medications:    red yeast  rice    telaprevir    telithromycin    voriconazole  This medicine may also interact with the following medications:    alcohol    antiviral medicines for HIV or AIDS    boceprevir    certain antibiotics like clarithromycin, erythromycin, troleandomycin    certain medicines for cholesterol like fenofibrate or gemfibrozil    cimetidine    clarithromycin    colchicine    cyclosporine    digoxin    female hormones, like estrogens or progestins and birth control pills    grapefruit juice    medicines for fungal infections like fluconazole, itraconazole, ketoconazole    niacin    rifampin    spironolactone  This list may not describe all possible interactions. Give your health care provider a list of all the medicines, herbs, non-prescription drugs, or dietary supplements you use. Also tell them if you smoke, drink alcohol, or use illegal drugs. Some items may interact with your medicine.  What should I watch for while using this medicine?  Visit your doctor or health care professional for regular check-ups. You may need regular tests to make sure your liver is working properly.  Tell your doctor or health care professional right away if you get any unexplained muscle pain, tenderness, or weakness, especially if you also have a fever and tiredness. Your doctor or health care professional may tell you to stop taking this medicine if you develop muscle problems. If your muscle problems do not go away after stopping this medicine, contact your health care professional.  This drug is only part of a total heart-health program. Your doctor or a dietician can suggest a low-cholesterol and low-fat diet to help. Avoid alcohol and smoking, and keep a proper exercise schedule.  Do not use this drug if you are pregnant or breast-feeding. Serious side effects to an unborn child or to an infant are possible. Talk to your doctor or pharmacist for more information.  This medicine may affect blood sugar levels. If you have diabetes,  check with your doctor or health care professional before you change your diet or the dose of your diabetic medicine.  If you are going to have surgery tell your health care professional that you are taking this drug.  What side effects may I notice from receiving this medicine?  Side effects that you should report to your doctor or health care professional as soon as possible:    allergic reactions like skin rash, itching or hives, swelling of the face, lips, or tongue    dark urine    fever    joint pain    muscle cramps, pain    redness, blistering, peeling or loosening of the skin, including inside the mouth    trouble passing urine or change in the amount of urine    unusually weak or tired    yellowing of eyes or skin  Side effects that usually do not require medical attention (report to your doctor or health care professional if they continue or are bothersome):    constipation    heartburn    stomach gas, pain, upset  This list may not describe all possible side effects. Call your doctor for medical advice about side effects. You may report side effects to FDA at 9-866-FDA-6660.  Where should I keep my medicine?  Keep out of the reach of children.  Store at room temperature between 20 to 25 degrees C (68 to 77 degrees F). Throw away any unused medicine after the expiration date.  NOTE:This sheet is a summary. It may not cover all possible information. If you have questions about this medicine, talk to your doctor, pharmacist, or health care provider. Copyright  2016 Gold Standard

## 2021-11-24 ENCOUNTER — IMMUNIZATION (OUTPATIENT)
Dept: NURSING | Facility: CLINIC | Age: 41
End: 2021-11-24
Payer: COMMERCIAL

## 2021-11-24 PROCEDURE — 0004A PR COVID VAC PFIZER DIL RECON 30 MCG/0.3 ML IM: CPT

## 2021-11-24 PROCEDURE — 91300 PR COVID VAC PFIZER DIL RECON 30 MCG/0.3 ML IM: CPT

## 2022-01-08 ENCOUNTER — HEALTH MAINTENANCE LETTER (OUTPATIENT)
Age: 42
End: 2022-01-08

## 2022-01-13 DIAGNOSIS — B18.1 VIRAL HEPATITIS B CHRONIC (H): ICD-10-CM

## 2022-01-13 RX ORDER — TENOFOVIR DISOPROXIL FUMARATE 300 MG/1
300 TABLET, FILM COATED ORAL DAILY
Qty: 90 TABLET | Refills: 1 | Status: SHIPPED | OUTPATIENT
Start: 2022-01-13 | End: 2022-08-12

## 2022-03-03 ENCOUNTER — OFFICE VISIT (OUTPATIENT)
Dept: FAMILY MEDICINE | Facility: CLINIC | Age: 42
End: 2022-03-03
Payer: COMMERCIAL

## 2022-03-03 VITALS
SYSTOLIC BLOOD PRESSURE: 115 MMHG | OXYGEN SATURATION: 96 % | HEART RATE: 89 BPM | DIASTOLIC BLOOD PRESSURE: 81 MMHG | WEIGHT: 219 LBS | BODY MASS INDEX: 32.13 KG/M2 | TEMPERATURE: 98.1 F

## 2022-03-03 DIAGNOSIS — G51.0 BELL'S PALSY: ICD-10-CM

## 2022-03-03 DIAGNOSIS — R29.810 FACIAL DROOP: Primary | ICD-10-CM

## 2022-03-03 PROCEDURE — 99215 OFFICE O/P EST HI 40 MIN: CPT | Performed by: NURSE PRACTITIONER

## 2022-03-03 RX ORDER — VALACYCLOVIR HYDROCHLORIDE 1 G/1
1000 TABLET, FILM COATED ORAL 3 TIMES DAILY
Qty: 21 TABLET | Refills: 0 | Status: SHIPPED | OUTPATIENT
Start: 2022-03-03 | End: 2022-03-15

## 2022-03-03 RX ORDER — PREDNISONE 20 MG/1
60 TABLET ORAL DAILY
Qty: 21 TABLET | Refills: 0 | Status: SHIPPED | OUTPATIENT
Start: 2022-03-03 | End: 2022-03-10

## 2022-03-03 NOTE — PROGRESS NOTES
Assessment & Plan     Bell's palsy    - predniSONE (DELTASONE) 20 MG tablet  Dispense: 21 tablet; Refill: 0  - valACYclovir (VALTREX) 1000 mg tablet  Dispense: 21 tablet; Refill: 0    Facial droop    - CT Head w/o Contrast  - CT Head w/o Contrast     Patient with typical Bell's palsy symptoms evolving over day and a half or so inclusive of difficulty completely closing left eye, drooling from the left side of the mouth, facial numbness and pain located behind left ear.  No rashes.    Patient's left eyebrow was slightly more droopy than the right, but still was able to move it approximately 80 to 90% of the right eyebrows range of motion.  He does state there is numbness in this area.  Explained that this is likely Bell's palsy, but gave him a choice for a rule out CT scan to ensure there was not a stroke due to the eyebrow finding.  Patient has no other neuro deficits.  Patient opted for CT which was negative for acute stroke after approximately 16 hours or more of symptoms    We will treat with prednisone, Valtrex, artificial tears.  Patient states he has these at home.  Explained taping eye closed, eye precautions and seeing ophthalmology or returning here if he starts developing eye pain.     Explained usual course of illness and that it does usually resolve slowly over several weeks to months.     Recheck with primary care provider after medications are complete in about 1 week.    40 minutes spent on the date of the encounter doing chart review, review of test results, patient visit, documentation and Approximately 15 minutes of detailed discussion on course of illness and answering questions         No follow-ups on file.    Jessica Hernandez [facility] JOSÉ MIGUEL García is a 41 year old male who presents to clinic today for the following health issues:  Chief Complaint   Patient presents with     Facial Pain     an unexplained episode of facial muscle weakness left side  "started last night      HPI    Started noticing left sided facial numbness while eating last night at around 3 PM.      Difficulty completely closing left eye.      Water coming out of the left side of the face this morning while brushing teeth.      Pain started behind the left ear yesterday as well - worsened throughout the day.     Has Type II diabetes and chronic Hep B - \"viral count down to zero.\"     Also c/o left upper neck pain x 2 weeks.     Denies weakness elsewhere, difficulty with speaking, fevers, difficulty hearing or ringing in the ears          Review of Systems  See HPI      Objective    /81 (BP Location: Left arm, Patient Position: Sitting, Cuff Size: Adult Large)   Pulse 89   Temp 98.1  F (36.7  C) (Tympanic)   Wt 99.3 kg (219 lb)   SpO2 96%   BMI 32.13 kg/m    Physical Exam  Constitutional:       Appearance: Normal appearance.   HENT:      Right Ear: Tympanic membrane normal.      Left Ear: Tympanic membrane normal.   Pulmonary:      Effort: Pulmonary effort is normal.   Musculoskeletal:         General: Normal range of motion.   Lymphadenopathy:      Cervical: Cervical adenopathy (Tender left submandibular lymph node and tender in preauricular area of left ear ) present.   Skin:     Findings: No rash.   Neurological:      Mental Status: He is alert and oriented to person, place, and time.      Sensory: Sensory deficit present.      Motor: Weakness (left facial droop, approximately equal eyebrow raise, maybe slightly reduced on left ) present.      Coordination: Coordination normal.      Gait: Gait normal.      Comments: Except as noted, no weakness.  No speech difficulties nor cognitive difficulties.   Psychiatric:         Mood and Affect: Mood normal.         Behavior: Behavior normal.         Thought Content: Thought content normal.         Judgment: Judgment normal.            Results for orders placed or performed in visit on 03/03/22   CT Head w/o Contrast     Status: None    " Narrative    EXAM DATE:         03/03/2022    EXAM: CT HEAD WITHOUT CONTRAST  LOCATION: [facility]  DATE/TIME: 3/3/2022 11:15 AM    INDICATION: Facial droop left  COMPARISON: None  TECHNIQUE: Routine CT Head without IV contrast. Multiplanar reformats. Dose reduction techniques were used.    FINDINGS:  INTRACRANIAL CONTENTS: No evidence of acute intracranial hemorrhage or mass effect. Brain attenuation and morphology otherwise normal. The ventricles and sulci are normal for age. Gray-white matter differentiation is maintained. The basilar cisterns are   patent.    VISUALIZED ORBITS/SINUSES/MASTOIDS: The globes are unremarkable. The partially imaged paranasal sinuses, mastoid air cells and middle ear cavities are unremarkable.    BONES/SOFT TISSUES: The visualized skull base and calvarium are unremarkable.    IMPRESSION:  1.  No evidence of acute intracranial hemorrhage or mass effect.                 No results found for this or any previous visit (from the past 24 hour(s)).

## 2022-03-03 NOTE — PATIENT INSTRUCTIONS
Patient Education     Hernandez s Palsy    Bell's Palsy is a problem involving the nerve that controls the muscles on one side of the face.  In most cases, the cause is unknown, but may be related to inflammation of the nerve, diabetes, pregnancy, Lyme disease, and viral infections such as herpes or varicella. Symptoms usually appear only on one side. They may include:    Inability to close the eyelid    Tearing of the eye    Facial drooping    Drooling    Numbness or pain    Changes in taste    Sound sensitivity  Damage to the eye can be a serious problem. The inability to blink can cause the eye to dry out. An ulcer (sore) can then form on the cornea. Also, not blinking means that the eye has no protection from dirt and dust particles.  Treatment involves protecting and moistening the eye. Medicines, such as steroids, may also help.  Most people recover fully within 3 to 6 months. However, the condition sometimes returns months or years later.  Home care    Get plenty of rest and eat a healthy diet to help yourself recover.    Use artificial tears often during the day and at bedtime to prevent drying. These drops are available without prescription at your drug store.    Wear protective glasses especially when outside to protect from flying debris. Use sunglasses when outdoors.    Tape the eyelid closed at bedtime with a paper tape (available at your pharmacy). It has a very mild adhesive so won't injure the lid. This will protect your eye from injury while you sleep.    Sometimes medicines are prescribed to reduce inflammation or treat specific viral infections of the nerve. If medicines are prescribed, take them exactly as directed. Usually the sooner the medicines are started, the more effective they are. Taking this medicine as prescribed will help with a full recovery.    Use low heat, for example from a heating pad, on the affected area. This can help reduce pain and swelling.    If you have severe pain,  contact your healthcare provider.  Follow-up care  Follow up with your healthcare provider as advised. If you referred to a specialist, make that appointment promptly.  When to seek medical advice  Call your healthcare provider if any of the following occur:    Severe eye redness    Eye pain    Thick drainage from the eye    Change in vision (such as double vision or losing vision)    Fever over 100.4 F (38 C) or as directed by your healthcare provider    Headache, neck pain, weakness, trouble speaking or walking, or other unexplained symptoms  Karen last reviewed this educational content on 3/1/2018    [phone] [employer]. All rights reserved. This information is not intended as a substitute for professional medical care. Always follow your healthcare professional's instructions.

## 2022-03-15 ENCOUNTER — HOSPITAL ENCOUNTER (EMERGENCY)
Facility: CLINIC | Age: 42
Discharge: HOME | End: 2022-03-15
Attending: NURSE PRACTITIONER | Admitting: NURSE PRACTITIONER
Payer: COMMERCIAL

## 2022-03-15 VITALS
OXYGEN SATURATION: 97 % | TEMPERATURE: 97.5 F | HEIGHT: 70 IN | HEART RATE: 109 BPM | SYSTOLIC BLOOD PRESSURE: 128 MMHG | WEIGHT: 220 LBS | RESPIRATION RATE: 18 BRPM | BODY MASS INDEX: 31.5 KG/M2 | DIASTOLIC BLOOD PRESSURE: 85 MMHG

## 2022-03-15 DIAGNOSIS — G51.0 BELL'S PALSY: ICD-10-CM

## 2022-03-15 PROCEDURE — 99213 OFFICE O/P EST LOW 20 MIN: CPT | Performed by: NURSE PRACTITIONER

## 2022-03-15 PROCEDURE — G0463 HOSPITAL OUTPT CLINIC VISIT: HCPCS | Performed by: NURSE PRACTITIONER

## 2022-03-15 RX ORDER — VALACYCLOVIR HYDROCHLORIDE 1 G/1
1000 TABLET, FILM COATED ORAL 3 TIMES DAILY
Qty: 21 TABLET | Refills: 0 | Status: SHIPPED | OUTPATIENT
Start: 2022-03-15 | End: 2023-02-16

## 2022-03-15 RX ORDER — PREDNISONE 20 MG/1
TABLET ORAL
Qty: 28 TABLET | Refills: 0 | Status: SHIPPED | OUTPATIENT
Start: 2022-03-15 | End: 2023-02-16

## 2022-03-15 ASSESSMENT — ENCOUNTER SYMPTOMS
NUMBNESS: 1
FACIAL ASYMMETRY: 1

## 2022-03-15 ASSESSMENT — VISUAL ACUITY
OD: 20/25
OS: 20/20

## 2022-03-15 NOTE — ED TRIAGE NOTES
Pt here with facial swelling and pain. Pt was diagnosed with Bell's palsy and placed steroids and an antiviral that ended on 3/11. Symptoms had improved and now are back to where they were prior to the medications.

## 2022-03-15 NOTE — ED PROVIDER NOTES
History   No chief complaint on file.    CARLOS Pruitt is a 41 year old male who presents to the urgent care for evaluation of continued [place] Palsy symptoms. Patient was evaluated in primary care 3/3/22 and diagnosed with [place] Palsy and started on prednisone and valacyclovir. Normal head CT at this visit. Since completing medications patient feels as though his facial droop, left sided jaw pain, and facial edema have since been increasing and feel as bad, if not worse, than initial presentation 2 weeks ago. No fevers, headache, dizziness, vision changes, speech difficulty, tinnitus, balance disturbance or difficulty swallowing.     Allergies:  No Known Allergies    Problem List:    Patient Active Problem List    Diagnosis Date Noted     Type 2 diabetes mellitus without complication, without long-term current use of insulin (H) 06/17/2020     Priority: Medium     Nonalcoholic hepatosteatosis 09/15/2015     Priority: Medium     Diagnostic skin and sensitization tests (aka ALLERGENS)      Priority: Medium     Seasonal allergic rhinitis      Priority: Medium     5/13/15 skin tests pos. for:  cat/dog/DM/M/T/G/W       Chronic idiopathic urticaria 03/23/2015     Priority: Medium     Obesity 03/06/2015     Priority: Medium     Viral hepatitis B chronic (H) 03/20/2014     Priority: Medium     Hand dermatitis 03/20/2014     Priority: Medium        Past Medical History:    Past Medical History:   Diagnosis Date     Diagnostic skin and sensitization tests (aka ALLERGENS) 5/13/15 skin tests pos. for:  cat/dog/DM/M/T/G/W     Hepatitis B      Seasonal allergic rhinitis      Type 2 diabetes mellitus without complication, without long-term current use of insulin (H)        Past Surgical History:    Past Surgical History:   Procedure Laterality Date     APPENDECTOMY  2009     [facility] APPENDECTOMY      Description: Appendectomy;  Proc Date: 07/01/2009;       Family History:    Family History   Problem Relation Age of Onset      "Liver Disease Mother         Hepatitis B     Eye Disorder Father      Liver Disease Sister         Hepatitis B     Eye Disorder Sister        Social History:  Marital Status:   [2]  Social History     Tobacco Use     Smoking status: Never Smoker     Smokeless tobacco: Never Used   Vaping Use     Vaping Use: Never used   Substance Use Topics     Alcohol use: No     Alcohol/week: 0.0 standard drinks     Drug use: No        Medications:    predniSONE (DELTASONE) 20 MG tablet  valACYclovir (VALTREX) 1000 mg tablet  alcohol swab prep pads  blood glucose (NO BRAND SPECIFIED) test strip  semaglutide (OZEMPIC, 0.25 OR 0.5 MG/DOSE,) 2 MG/1.5ML SOPN pen  tenofovir (VIREAD) 300 MG tablet  thin (NO BRAND SPECIFIED) lancets          Review of Systems   Neurological: Positive for facial asymmetry and numbness.   All other systems reviewed and are negative.      Physical Exam   BP: 128/85  Pulse: 109  Temp: 97.5  F (36.4  C)  Resp: 18  Height: 177.8 cm (5' 10\")  Weight: 99.8 kg (220 lb)  SpO2: 97 %      Physical Exam  Constitutional:       General: He is not in acute distress.     Appearance: Normal appearance.   Eyes:      General: No visual field deficit.  Cardiovascular:      Rate and Rhythm: Normal rate.   Pulmonary:      Effort: Pulmonary effort is normal.   Musculoskeletal:         General: Normal range of motion.   Skin:     General: Skin is warm.      Capillary Refill: Capillary refill takes less than 2 seconds.   Neurological:      Mental Status: He is alert and oriented to person, place, and time.      GCS: GCS eye subscore is 4. GCS verbal subscore is 5. GCS motor subscore is 6.      Cranial Nerves: Facial asymmetry (left sided facial droop with inability to fully close left eye) present.      Sensory: Sensory deficit (decreased sensation to left side of face) present.      Motor: Motor function is intact.      Coordination: Coordination is intact.      Gait: Gait is intact.         ED Course               "   Procedures    No results found for this or any previous visit (from the past 24 hour(s)).    Medications - No data to display    Assessments & Plan (with Medical Decision Making)   Ricky Pruitt is a 41 year old male who presents to the urgent care for evaluation of continued [place] Palsy symptoms. Patient was evaluated in primary care 3/3/22 and diagnosed with [place] Palsy and started on prednisone and valacyclovir. Normal head CT at this visit. Since completing medications patient feels as though his facial droop, left sided jaw pain, and facial edema have since been increasing and feel as bad, if not worse, than initial presentation 2 weeks ago. Vitals normal. Exam as above. Plan to increase prednisone for additional week and continue valacyclovir. He has scheduled follow up in 3 days. May need further neurology consult. Plan to continue to protect the eye and use lubricating drops. Return precautions reviewed, all questions answered. Patient is agreeable to plan of care and discharged in no acute distress.     I have reviewed the nursing notes.    I have reviewed the findings, diagnosis, plan and need for follow up with the patient.    Discharge Medication List as of 3/15/2022  1:59 PM      START taking these medications    Details   predniSONE (DELTASONE) 20 MG tablet Take four tablets (= 80mg) each day for 7 (seven) days, Disp-28 tablet, R-0, E-Prescribe             Final diagnoses:   Bell's palsy       3/15/2022   [facility] EMERGENCY DEPT     Lory Ly, MARKOS CNP  03/15/22 2501

## 2022-03-18 ENCOUNTER — OFFICE VISIT (OUTPATIENT)
Dept: FAMILY MEDICINE | Facility: CLINIC | Age: 42
End: 2022-03-18
Payer: COMMERCIAL

## 2022-03-18 VITALS
TEMPERATURE: 98.2 F | DIASTOLIC BLOOD PRESSURE: 84 MMHG | BODY MASS INDEX: 30.72 KG/M2 | WEIGHT: 214.6 LBS | HEIGHT: 70 IN | SYSTOLIC BLOOD PRESSURE: 122 MMHG | RESPIRATION RATE: 17 BRPM | OXYGEN SATURATION: 95 % | HEART RATE: 112 BPM

## 2022-03-18 DIAGNOSIS — E11.9 TYPE 2 DIABETES MELLITUS WITHOUT COMPLICATION, WITHOUT LONG-TERM CURRENT USE OF INSULIN (H): ICD-10-CM

## 2022-03-18 DIAGNOSIS — G51.0 FACIAL PARALYSIS/BELLS PALSY: Primary | ICD-10-CM

## 2022-03-18 DIAGNOSIS — B18.1 VIRAL HEPATITIS B CHRONIC (H): ICD-10-CM

## 2022-03-18 PROCEDURE — 99214 OFFICE O/P EST MOD 30 MIN: CPT | Performed by: NURSE PRACTITIONER

## 2022-03-18 ASSESSMENT — PAIN SCALES - GENERAL: PAINLEVEL: NO PAIN (0)

## 2022-03-18 NOTE — PROGRESS NOTES
Assessment & Plan     Facial paralysis/[place] palsy  Discussed natural coarse of illness. Encouraged to continue medication valtrex and prednisone. Facial massage and muscle work to help with resolution. Follow up if symptoms are worsening/ changing/ not improving in the expected time frame.     Viral hepatitis B chronic (H)  Managed by gastroenterology. Doing well.     Type 2 diabetes mellitus without complication, without long-term current use of insulin (H)  Due for follow up. He desires to wait till he completes prednisone.       Return in about 4 weeks (around 4/15/2022) for ongoing symptoms if not improving.    MARKOS Gross CNP  [facility]    Tana García is a 41 year old who presents for the following health issues;     HPI     ED/UC Followup:    Facility:  [facility]  Date of visit: 3/15/2021  Reason for visit: [place] Palsy  Current Status: patient is still taking valcyclovir and prednisone, he states he is doing much better.      Initially diagnosed on 3/3. He was prescribed prednisone and valtrex. CT head confirmed no intracranial impact. Facial movement has been present but seems to wax and wane. Left side of face is affected. He returned to the ER on 3/15 with worsening symptoms of left lower jaw pain and was given another course of prednisone. He is utilizing artificial tears and wearing an eye patch at night. He is wondering if further testing or treatments are needed. He is also wondering if massage and muscle work is needed.     His family has a history of bells palsy that has been persistent.   He has a personal history of diabetes. He is due for diabetes check but would like to wait until he is through with treatment for the bells palsy. In addition he has chronic Hep B. This has been stable for him.     He denies chest pain, sob, or difficulty breathing.     Review of Systems   Constitutional, HEENT, cardiovascular, pulmonary, gi and gu  "systems are negative, except as otherwise noted.      Objective    /84   Pulse 112   Temp 98.2  F (36.8  C) (Tympanic)   Resp 17   Ht 1.778 m (5' 10\")   Wt 97.3 kg (214 lb 9.6 oz)   SpO2 95%   BMI 30.79 kg/m    Body mass index is 30.79 kg/m .     Physical Exam   GENERAL: healthy, alert and no distress  NEURO: Normal strength and tone, sensory exam grossly normal, mentation intact and cranial nerve 5 impacted with left sided facial drooping and paralysis of the muscles.   PSYCH: mentation appears normal, affect normal/bright    Lab on 08/24/2021   Component Date Value Ref Range Status     Hepatitis B DNA IU/mL 08/24/2021 Not Detected  Not Detected IU/mL Final     WBC Count 08/24/2021 8.6  4.0 - 11.0 10e3/uL Final     RBC Count 08/24/2021 5.63  4.40 - 5.90 10e6/uL Final     Hemoglobin 08/24/2021 16.6  13.3 - 17.7 g/dL Final     Hematocrit 08/24/2021 47.1  40.0 - 53.0 % Final     MCV 08/24/2021 84  78 - 100 fL Final     MCH 08/24/2021 29.5  26.5 - 33.0 pg Final     MCHC 08/24/2021 35.2  31.5 - 36.5 g/dL Final     RDW 08/24/2021 13.2  10.0 - 15.0 % Final     Platelet Count 08/24/2021 177  150 - 450 10e3/uL Final     Sodium 08/24/2021 135  133 - 144 mmol/L Final     Potassium 08/24/2021 3.8  3.4 - 5.3 mmol/L Final     Chloride 08/24/2021 102  94 - 109 mmol/L Final     Carbon Dioxide (CO2) 08/24/2021 28  20 - 32 mmol/L Final     Anion Gap 08/24/2021 5  3 - 14 mmol/L Final     Urea Nitrogen 08/24/2021 14  7 - 30 mg/dL Final     Creatinine 08/24/2021 0.80  0.66 - 1.25 mg/dL Final     Calcium 08/24/2021 9.6  8.5 - 10.1 mg/dL Final     Glucose 08/24/2021 115 (A) 70 - 99 mg/dL Final     GFR Estimate 08/24/2021 >90  >60 mL/min/1.73m2 Final    As of July 11, 2021, eGFR is calculated by the CKD-EPI creatinine equation, without race adjustment. eGFR can be influenced by muscle mass, exercise, and diet. The reported eGFR is an estimation only and is only applicable if the renal function is stable.     INR 08/24/2021 " 1.00  0.85 - 1.15 Final    Effective 7/11/2021, the reference range for this assay has changed.     Bilirubin Total 08/24/2021 1.4 (A) 0.2 - 1.3 mg/dL Final     Bilirubin Direct 08/24/2021 0.3 (A) 0.0 - 0.2 mg/dL Final     Protein Total 08/24/2021 9.0 (A) 6.8 - 8.8 g/dL Final     Albumin 08/24/2021 3.9  3.4 - 5.0 g/dL Final     Alkaline Phosphatase 08/24/2021 113  40 - 150 U/L Final     AST 08/24/2021 65 (A) 0 - 45 U/L Final     ALT 08/24/2021 108 (A) 0 - 70 U/L Final     Hemoglobin A1C 08/24/2021 7.0 (A) 0.0 - 5.6 % Final    Normal <5.7%   Prediabetes 5.7-6.4%    Diabetes 6.5% or higher     Note: Adopted from ADA consensus guidelines.     Creatinine Urine mg/dL 08/24/2021 30  mg/dL Final     Albumin Urine mg/L 08/24/2021 7  mg/L Final     Albumin Urine mg/g Cr 08/24/2021 23.33 (A) 0.00 - 17.00 mg/g Cr Final     CT Head w/o Contrast    Result Date: 3/3/2022  EXAM DATE:         03/03/2022 EXAM: CT HEAD WITHOUT CONTRAST LOCATION: [facility] DATE/TIME: 3/3/2022 11:15 AM INDICATION: Facial droop left COMPARISON: None TECHNIQUE: Routine CT Head without IV contrast. Multiplanar reformats. Dose reduction techniques were used. FINDINGS: INTRACRANIAL CONTENTS: No evidence of acute intracranial hemorrhage or mass effect. Brain attenuation and morphology otherwise normal. The ventricles and sulci are normal for age. Gray-white matter differentiation is maintained. The basilar cisterns are patent. VISUALIZED ORBITS/SINUSES/MASTOIDS: The globes are unremarkable. The partially imaged paranasal sinuses, mastoid air cells and middle ear cavities are unremarkable. BONES/SOFT TISSUES: The visualized skull base and calvarium are unremarkable. IMPRESSION: 1.  No evidence of acute intracranial hemorrhage or mass effect.

## 2022-04-30 ENCOUNTER — HEALTH MAINTENANCE LETTER (OUTPATIENT)
Age: 42
End: 2022-04-30

## 2022-08-12 DIAGNOSIS — B18.1 VIRAL HEPATITIS B CHRONIC (H): ICD-10-CM

## 2022-08-12 RX ORDER — TENOFOVIR DISOPROXIL FUMARATE 300 MG/1
300 TABLET, FILM COATED ORAL DAILY
Qty: 90 TABLET | Refills: 0 | Status: SHIPPED | OUTPATIENT
Start: 2022-08-12 | End: 2022-08-23

## 2022-08-14 ENCOUNTER — HEALTH MAINTENANCE LETTER (OUTPATIENT)
Age: 42
End: 2022-08-14

## 2022-08-23 DIAGNOSIS — B18.1 VIRAL HEPATITIS B CHRONIC (H): ICD-10-CM

## 2022-08-23 RX ORDER — TENOFOVIR DISOPROXIL FUMARATE 300 MG/1
300 TABLET, FILM COATED ORAL DAILY
Qty: 90 TABLET | Refills: 0 | Status: SHIPPED | OUTPATIENT
Start: 2022-08-23 | End: 2023-01-30

## 2022-11-19 ENCOUNTER — HEALTH MAINTENANCE LETTER (OUTPATIENT)
Age: 42
End: 2022-11-19

## 2022-12-18 ENCOUNTER — HEALTH MAINTENANCE LETTER (OUTPATIENT)
Age: 42
End: 2022-12-18

## 2023-01-05 ENCOUNTER — TRANSFERRED RECORDS (OUTPATIENT)
Dept: HEALTH INFORMATION MANAGEMENT | Facility: CLINIC | Age: 43
End: 2023-01-05

## 2023-01-05 LAB — RETINOPATHY: NEGATIVE

## 2023-01-30 ENCOUNTER — DOCUMENTATION ONLY (OUTPATIENT)
Dept: LAB | Facility: CLINIC | Age: 43
End: 2023-01-30
Payer: COMMERCIAL

## 2023-01-30 ENCOUNTER — TELEPHONE (OUTPATIENT)
Dept: GASTROENTEROLOGY | Facility: CLINIC | Age: 43
End: 2023-01-30
Payer: COMMERCIAL

## 2023-01-30 DIAGNOSIS — B18.1 VIRAL HEPATITIS B CHRONIC (H): ICD-10-CM

## 2023-01-30 DIAGNOSIS — E11.9 TYPE 2 DIABETES MELLITUS WITHOUT COMPLICATION, WITHOUT LONG-TERM CURRENT USE OF INSULIN (H): Primary | ICD-10-CM

## 2023-01-30 DIAGNOSIS — K76.0 NONALCOHOLIC HEPATOSTEATOSIS: ICD-10-CM

## 2023-01-30 RX ORDER — TENOFOVIR DISOPROXIL FUMARATE 300 MG/1
300 TABLET, FILM COATED ORAL DAILY
Qty: 60 TABLET | Refills: 0 | Status: SHIPPED | OUTPATIENT
Start: 2023-01-30 | End: 2023-03-08

## 2023-01-30 NOTE — TELEPHONE ENCOUNTER
Left Voicemail (1st Attempt) and Sent Mychart (1st Attempt) for the patient to call back and schedule the following:    Appointment type: Return Liver  Provider: Shameka Sosa  Return date: as soon as possible  Specialty phone number: [phone]  Additional appointment(s) needed: labs  Additonal Notes:     LVM & sent MyChart // pt needs to schedule follow up with Shameka Sosa at next convenience // first attempt, AN 1.30.23

## 2023-02-01 ENCOUNTER — TELEPHONE (OUTPATIENT)
Dept: GASTROENTEROLOGY | Facility: CLINIC | Age: 43
End: 2023-02-01
Payer: COMMERCIAL

## 2023-02-01 NOTE — TELEPHONE ENCOUNTER
Left Voicemail (2nd Attempt) and Sent Mychart (2nd Attempt) for the patient to call back and schedule the following:     Appointment type: Return Liver  Provider: Shaemka Sosa  Return date: as soon as possible  Specialty phone number: [phone]  Additional appointment(s) needed: labs  Additonal Notes:      LVM & sent MyChart // pt needs to schedule follow up with Shameka Sosa at next convenience // second attempt, AN 2.1.23

## 2023-02-01 NOTE — TELEPHONE ENCOUNTER
[facility] Call Center    Phone Message    May a detailed message be left on voicemail: yes     Reason for Call: Other: Patient wanted to verify if video appt on 3/8/23 is okay or if he needs an in person appt.  Also, labs will be done on 2/2/23, is that okay?  Please follow up with patient.     Action Taken: Message routed to:  [facility]    Travel Screening: Not Applicable

## 2023-02-02 ENCOUNTER — LAB (OUTPATIENT)
Dept: LAB | Facility: CLINIC | Age: 43
End: 2023-02-02
Payer: COMMERCIAL

## 2023-02-02 DIAGNOSIS — K76.0 NONALCOHOLIC HEPATOSTEATOSIS: ICD-10-CM

## 2023-02-02 DIAGNOSIS — E11.9 TYPE 2 DIABETES MELLITUS WITHOUT COMPLICATION, WITHOUT LONG-TERM CURRENT USE OF INSULIN (H): Primary | ICD-10-CM

## 2023-02-02 DIAGNOSIS — B18.1 VIRAL HEPATITIS B CHRONIC (H): Primary | ICD-10-CM

## 2023-02-02 DIAGNOSIS — E11.9 TYPE 2 DIABETES MELLITUS WITHOUT COMPLICATION, WITHOUT LONG-TERM CURRENT USE OF INSULIN (H): ICD-10-CM

## 2023-02-02 LAB
ANION GAP SERPL CALCULATED.3IONS-SCNC: 7 MMOL/L (ref 7–15)
BUN SERPL-MCNC: 16.9 MG/DL (ref 6–20)
CALCIUM SERPL-MCNC: 9.9 MG/DL (ref 8.6–10)
CHLORIDE SERPL-SCNC: 97 MMOL/L (ref 98–107)
CHOLEST SERPL-MCNC: 183 MG/DL
CREAT SERPL-MCNC: 0.76 MG/DL (ref 0.67–1.17)
CREAT UR-MCNC: 225.3 MG/DL
DEPRECATED HCO3 PLAS-SCNC: 32 MMOL/L (ref 22–29)
GFR SERPL CREATININE-BSD FRML MDRD: >90 ML/MIN/1.73M2
GLUCOSE SERPL-MCNC: 277 MG/DL (ref 70–99)
HBA1C MFR BLD: 11.6 % (ref 0–5.6)
HDLC SERPL-MCNC: 39 MG/DL
LDLC SERPL CALC-MCNC: 104 MG/DL
MICROALBUMIN UR-MCNC: 58.4 MG/L
MICROALBUMIN/CREAT UR: 25.92 MG/G CR (ref 0–17)
NONHDLC SERPL-MCNC: 144 MG/DL
POTASSIUM SERPL-SCNC: 4 MMOL/L (ref 3.4–5.3)
SODIUM SERPL-SCNC: 136 MMOL/L (ref 136–145)
TRIGL SERPL-MCNC: 202 MG/DL

## 2023-02-02 PROCEDURE — 80061 LIPID PANEL: CPT

## 2023-02-02 PROCEDURE — 36415 COLL VENOUS BLD VENIPUNCTURE: CPT

## 2023-02-02 PROCEDURE — 83036 HEMOGLOBIN GLYCOSYLATED A1C: CPT

## 2023-02-02 PROCEDURE — 80048 BASIC METABOLIC PNL TOTAL CA: CPT

## 2023-02-02 PROCEDURE — 82570 ASSAY OF URINE CREATININE: CPT

## 2023-02-02 PROCEDURE — 82043 UR ALBUMIN QUANTITATIVE: CPT

## 2023-02-02 NOTE — CONFIDENTIAL NOTE
Sarah message sent.    JOSE A BlakelyN, RN, PHN  Clinic [facility]  Hepatology  [facility]

## 2023-02-03 ENCOUNTER — TELEPHONE (OUTPATIENT)
Dept: FAMILY MEDICINE | Facility: CLINIC | Age: 43
End: 2023-02-03
Payer: COMMERCIAL

## 2023-02-03 NOTE — TELEPHONE ENCOUNTER
Diabetes Education Scheduling Outreach #1:    Call to patient to schedule. Left message with phone number to call to schedule.    Also sent [employer] message for second attempt. Requested patient to call to schedule.    Yoli [employer]les [employer]  Diabetes and Nutrition Scheduling

## 2023-02-09 ASSESSMENT — ENCOUNTER SYMPTOMS
WEAKNESS: 0
MYALGIAS: 0
DIZZINESS: 0
PALPITATIONS: 0
FREQUENCY: 0
JOINT SWELLING: 0
SHORTNESS OF BREATH: 0
SORE THROAT: 0
EYE PAIN: 0
HEARTBURN: 0
PARESTHESIAS: 0
FEVER: 0
ABDOMINAL PAIN: 0
COUGH: 0
DIARRHEA: 0
HEADACHES: 0
DYSURIA: 0
CONSTIPATION: 0
HEMATURIA: 0
ARTHRALGIAS: 0
NAUSEA: 0
NERVOUS/ANXIOUS: 0
HEMATOCHEZIA: 0
CHILLS: 0

## 2023-02-16 ENCOUNTER — OFFICE VISIT (OUTPATIENT)
Dept: FAMILY MEDICINE | Facility: CLINIC | Age: 43
End: 2023-02-16
Payer: COMMERCIAL

## 2023-02-16 VITALS
RESPIRATION RATE: 16 BRPM | OXYGEN SATURATION: 97 % | TEMPERATURE: 97.7 F | HEART RATE: 82 BPM | WEIGHT: 214.2 LBS | DIASTOLIC BLOOD PRESSURE: 76 MMHG | SYSTOLIC BLOOD PRESSURE: 118 MMHG | HEIGHT: 70 IN | BODY MASS INDEX: 30.67 KG/M2

## 2023-02-16 DIAGNOSIS — E11.9 TYPE 2 DIABETES MELLITUS WITHOUT COMPLICATION, WITHOUT LONG-TERM CURRENT USE OF INSULIN (H): ICD-10-CM

## 2023-02-16 DIAGNOSIS — Z00.00 ROUTINE HEALTH MAINTENANCE: Primary | ICD-10-CM

## 2023-02-16 DIAGNOSIS — Z83.719 FAMILY HISTORY OF COLONIC POLYPS: ICD-10-CM

## 2023-02-16 DIAGNOSIS — B18.1 VIRAL HEPATITIS B CHRONIC (H): ICD-10-CM

## 2023-02-16 PROCEDURE — 99214 OFFICE O/P EST MOD 30 MIN: CPT | Mod: 25 | Performed by: FAMILY MEDICINE

## 2023-02-16 PROCEDURE — 99396 PREV VISIT EST AGE 40-64: CPT | Performed by: FAMILY MEDICINE

## 2023-02-16 PROCEDURE — 99207 PR FOOT EXAM NO CHARGE: CPT | Mod: 25 | Performed by: FAMILY MEDICINE

## 2023-02-16 RX ORDER — SEMAGLUTIDE 1.34 MG/ML
INJECTION, SOLUTION SUBCUTANEOUS
Qty: 6 ML | Refills: 0 | Status: CANCELLED | OUTPATIENT
Start: 2023-02-16

## 2023-02-16 ASSESSMENT — ENCOUNTER SYMPTOMS
SORE THROAT: 0
WEAKNESS: 0
EYE PAIN: 0
ABDOMINAL PAIN: 0
CONSTIPATION: 0
COUGH: 0
PALPITATIONS: 0
MYALGIAS: 0
HEADACHES: 0
FREQUENCY: 0
FEVER: 0
DIZZINESS: 0
ARTHRALGIAS: 0
HEMATOCHEZIA: 0
HEMATURIA: 0
CHILLS: 0
JOINT SWELLING: 0
PARESTHESIAS: 0
DIARRHEA: 0
NAUSEA: 0
DYSURIA: 0
NERVOUS/ANXIOUS: 0
HEARTBURN: 0
SHORTNESS OF BREATH: 0

## 2023-02-16 ASSESSMENT — PAIN SCALES - GENERAL: PAINLEVEL: NO PAIN (0)

## 2023-02-16 NOTE — PROGRESS NOTES
SUBJECTIVE:   CC: Ricky is an 42 year old who presents for preventative health visit.   Patient has been advised of split billing requirements and indicates understanding: Yes  Healthy Habits:     Getting at least 3 servings of Calcium per day:  NO    Bi-annual eye exam:  Yes    Dental care twice a year:  NO    Sleep apnea or symptoms of sleep apnea:  None    Diet:  Regular (no restrictions)    Frequency of exercise:  None    Taking medications regularly:  Yes    Medication side effects:  None    PHQ-2 Total Score: 0    Additional concerns today:  No    * Review diabetic labs.  Was out of his ozempic for 2 months, did just start it again a couple weeks ago. He ran out of medication. He is at 0.75 mg a week, he has been back on it about a week now.     -referral for colonoscopy, younger sister has been having them regularly with multiple polyps-non cancerous. The remainder of siblings have been having early colonoscopies because of this recommendation.     -Follows with GI for chronic Hep B.     Today's PHQ-2 Score:   PHQ-2 ( 1999 [employer]) 2/9/2023   Q1: Little interest or pleasure in doing things 0   Q2: Feeling down, depressed or hopeless 0   PHQ-2 Score 0   PHQ-2 Total Score (12-17 Years)- Positive if 3 or more points; Administer PHQ-A if positive -   Q1: Little interest or pleasure in doing things Not at all   Q2: Feeling down, depressed or hopeless Not at all   PHQ-2 Score 0       Social History     Tobacco Use     Smoking status: Never     Smokeless tobacco: Never   Substance Use Topics     Alcohol use: No       Alcohol Use 2/9/2023   Prescreen: >3 drinks/day or >7 drinks/week? Not Applicable   Prescreen: >3 drinks/day or >7 drinks/week? -       Last PSA: No results found for: PSA    Reviewed orders with patient. Reviewed health maintenance and updated orders accordingly - Yes  Lab work is in process    Reviewed and updated as needed this visit by clinical staff   Tobacco  Allergies  Meds           "    Reviewed and updated as needed this visit by Provider                     Review of Systems   Constitutional: Negative for chills and fever.   HENT: Negative for congestion, ear pain, hearing loss and sore throat.    Eyes: Negative for pain and visual disturbance.   Respiratory: Negative for cough and shortness of breath.    Cardiovascular: Negative for chest pain, palpitations and peripheral edema.   Gastrointestinal: Negative for abdominal pain, constipation, diarrhea, heartburn, hematochezia and nausea.   Genitourinary: Negative for dysuria, frequency, genital sores, hematuria, impotence, penile discharge and urgency.   Musculoskeletal: Negative for arthralgias, joint swelling and myalgias.   Skin: Negative for rash.   Neurological: Negative for dizziness, weakness, headaches and paresthesias.   Psychiatric/Behavioral: Negative for mood changes. The patient is not nervous/anxious.        OBJECTIVE:   /76 (BP Location: Right arm, Patient Position: Chair, Cuff Size: Adult Large)   Pulse 82   Temp 97.7  F (36.5  C) (Tympanic)   Resp 16   Ht 1.778 m (5' 10\")   Wt 97.2 kg (214 lb 3.2 oz)   SpO2 97%   BMI 30.73 kg/m      Physical Exam  Constitutional:       Appearance: Normal appearance.   HENT:      Head: Normocephalic.      Right Ear: Tympanic membrane normal.      Left Ear: Tympanic membrane normal.      Mouth/Throat:      Mouth: Mucous membranes are moist.   Eyes:      Conjunctiva/sclera: Conjunctivae normal.   Cardiovascular:      Rate and Rhythm: Normal rate and regular rhythm.   Pulmonary:      Effort: Pulmonary effort is normal.      Breath sounds: Normal breath sounds.   Abdominal:      General: Bowel sounds are normal.      Palpations: Abdomen is soft.   Musculoskeletal:      Cervical back: Neck supple.      Right lower leg: No edema.      Left lower leg: No edema.   Lymphadenopathy:      Cervical: No cervical adenopathy.   Skin:     General: Skin is warm and dry.   Neurological:      Mental " Status: He is alert.   Psychiatric:         Thought Content: Thought content normal.         Judgment: Judgment normal.       Diabetic foot exam: normal DP and PT pulses, no trophic changes or ulcerative lesions and normal sensory exam    ASSESSMENT/PLAN:   Ricky was seen today for physical.    Diagnoses and all orders for this visit:    Routine health maintenance  Encouraged regular exercise. He will plan to update his vaccines at later date.     Type 2 diabetes mellitus without complication, without long-term current use of insulin (H)  Reviewed recent blood work showing uncontrolled diabetes. This is likely because of being off his ozempic for 2 months. No large change sin weight or diet. He has restarted Ozempic 0.75 mg weekly injection about 1 week ago. After discussion will come back in 3 months to recheck A1c-keep current dose of ozempic for now. He will plan to meet with diabetic education in interim-can consider CGM. IF blood sugars are increasing in interim I have recommended he reach back out to office and we can consider increasing his dose of ozempic to 1mg weekly.    -Discussed indication for statin. HE will plan to think about this.   -Up to date for diabetic eye exam  -     FOOT EXAM  -     semaglutide (OZEMPIC) 2 MG/1.5ML SOPN pen; Inject 0.75 mg Subcutaneous every 7 days for 60 days  -     Hemoglobin A1c FUTURE 3mo; Future  -     Lipid panel reflex to direct LDL Fasting; Future    Family history of colonic polyps  Per patient significant family hx of colonic polyps at young age-adenomatous colon polyp in sister, she has been getting colonoscopies regularly since early 30s.   -     Colonoscopy Screening  Referral; Future    Viral hepatitis B chronic (H)  Following with GI. On tenofovir    Other orders  -     REVIEW OF HEALTH MAINTENANCE PROTOCOL ORDERS        Patient has been advised of split billing requirements and indicates understanding: Yes      COUNSELING:   Reviewed preventive health  "counseling, as reflected in patient instructions       Regular exercise       Healthy diet/nutrition       Colorectal cancer screening      BMI:   Estimated body mass index is 30.73 kg/m  as calculated from the following:    Height as of this encounter: 1.778 m (5' 10\").    Weight as of this encounter: 97.2 kg (214 lb 3.2 oz).   Weight management plan: Discussed healthy diet and exercise guidelines      He reports that he has never smoked. He has never used smokeless tobacco.        JANICE ROMAN DO  [facility]  "

## 2023-02-28 ENCOUNTER — TELEPHONE (OUTPATIENT)
Dept: GASTROENTEROLOGY | Facility: CLINIC | Age: 43
End: 2023-02-28
Payer: COMMERCIAL

## 2023-02-28 NOTE — TELEPHONE ENCOUNTER
LVM & sent [employer] message // pt needs labs ordered by Shameka Sosa PA-C prior to visit on 3.8.23 // first attempt, AN 2.28.23

## 2023-03-03 ENCOUNTER — LAB (OUTPATIENT)
Dept: LAB | Facility: CLINIC | Age: 43
End: 2023-03-03
Payer: COMMERCIAL

## 2023-03-03 DIAGNOSIS — K76.0 NONALCOHOLIC HEPATOSTEATOSIS: ICD-10-CM

## 2023-03-03 DIAGNOSIS — B18.1 VIRAL HEPATITIS B CHRONIC (H): ICD-10-CM

## 2023-03-03 LAB
ALBUMIN SERPL BCG-MCNC: 4.5 G/DL (ref 3.5–5.2)
ALP SERPL-CCNC: 134 U/L (ref 40–129)
ALT SERPL W P-5'-P-CCNC: 213 U/L (ref 10–50)
ANION GAP SERPL CALCULATED.3IONS-SCNC: 13 MMOL/L (ref 7–15)
AST SERPL W P-5'-P-CCNC: 119 U/L (ref 10–50)
BILIRUB DIRECT SERPL-MCNC: 0.27 MG/DL (ref 0–0.3)
BILIRUB SERPL-MCNC: 1.7 MG/DL
BUN SERPL-MCNC: 13.2 MG/DL (ref 6–20)
CALCIUM SERPL-MCNC: 10.3 MG/DL (ref 8.6–10)
CHLORIDE SERPL-SCNC: 100 MMOL/L (ref 98–107)
CREAT SERPL-MCNC: 0.88 MG/DL (ref 0.67–1.17)
DEPRECATED HCO3 PLAS-SCNC: 26 MMOL/L (ref 22–29)
ERYTHROCYTE [DISTWIDTH] IN BLOOD BY AUTOMATED COUNT: 12.9 % (ref 10–15)
GFR SERPL CREATININE-BSD FRML MDRD: >90 ML/MIN/1.73M2
GLUCOSE SERPL-MCNC: 147 MG/DL (ref 70–99)
HCT VFR BLD AUTO: 53.2 % (ref 40–53)
HGB BLD-MCNC: 18.1 G/DL (ref 13.3–17.7)
INR PPP: 1.06 (ref 0.85–1.15)
MCH RBC QN AUTO: 29.1 PG (ref 26.5–33)
MCHC RBC AUTO-ENTMCNC: 34 G/DL (ref 31.5–36.5)
MCV RBC AUTO: 85 FL (ref 78–100)
PLATELET # BLD AUTO: 169 10E3/UL (ref 150–450)
POTASSIUM SERPL-SCNC: 3.7 MMOL/L (ref 3.4–5.3)
PROT SERPL-MCNC: 8.6 G/DL (ref 6.4–8.3)
RBC # BLD AUTO: 6.23 10E6/UL (ref 4.4–5.9)
SODIUM SERPL-SCNC: 139 MMOL/L (ref 136–145)
WBC # BLD AUTO: 9 10E3/UL (ref 4–11)

## 2023-03-03 PROCEDURE — 85027 COMPLETE CBC AUTOMATED: CPT

## 2023-03-03 PROCEDURE — 80053 COMPREHEN METABOLIC PANEL: CPT

## 2023-03-03 PROCEDURE — 36415 COLL VENOUS BLD VENIPUNCTURE: CPT

## 2023-03-03 PROCEDURE — 87517 HEPATITIS B DNA QUANT: CPT

## 2023-03-03 PROCEDURE — 85610 PROTHROMBIN TIME: CPT

## 2023-03-03 PROCEDURE — 82248 BILIRUBIN DIRECT: CPT

## 2023-03-05 DIAGNOSIS — B18.1 VIRAL HEPATITIS B CHRONIC (H): Primary | ICD-10-CM

## 2023-03-05 DIAGNOSIS — K76.0 NONALCOHOLIC HEPATOSTEATOSIS: ICD-10-CM

## 2023-03-06 LAB
HBV DNA SERPL NAA+PROBE-ACNC: <20 IU/ML
HBV DNA SERPL NAA+PROBE-LOG IU: <1.3 {LOG_IU}/ML

## 2023-03-08 ENCOUNTER — TELEPHONE (OUTPATIENT)
Dept: EDUCATION SERVICES | Facility: CLINIC | Age: 43
End: 2023-03-08

## 2023-03-08 ENCOUNTER — VIRTUAL VISIT (OUTPATIENT)
Dept: EDUCATION SERVICES | Facility: CLINIC | Age: 43
End: 2023-03-08
Attending: FAMILY MEDICINE
Payer: COMMERCIAL

## 2023-03-08 ENCOUNTER — TELEPHONE (OUTPATIENT)
Dept: FAMILY MEDICINE | Facility: CLINIC | Age: 43
End: 2023-03-08

## 2023-03-08 ENCOUNTER — VIRTUAL VISIT (OUTPATIENT)
Dept: GASTROENTEROLOGY | Facility: CLINIC | Age: 43
End: 2023-03-08
Attending: PHYSICIAN ASSISTANT
Payer: COMMERCIAL

## 2023-03-08 DIAGNOSIS — B18.1 VIRAL HEPATITIS B CHRONIC (H): ICD-10-CM

## 2023-03-08 DIAGNOSIS — E11.9 TYPE 2 DIABETES MELLITUS WITHOUT COMPLICATION, WITHOUT LONG-TERM CURRENT USE OF INSULIN (H): Primary | ICD-10-CM

## 2023-03-08 DIAGNOSIS — K75.81 NASH (NONALCOHOLIC STEATOHEPATITIS): Primary | ICD-10-CM

## 2023-03-08 DIAGNOSIS — E11.9 TYPE 2 DIABETES MELLITUS WITHOUT COMPLICATION, WITHOUT LONG-TERM CURRENT USE OF INSULIN (H): ICD-10-CM

## 2023-03-08 PROCEDURE — G0108 DIAB MANAGE TRN  PER INDIV: HCPCS | Mod: VID | Performed by: DIETITIAN, REGISTERED

## 2023-03-08 PROCEDURE — 99215 OFFICE O/P EST HI 40 MIN: CPT | Mod: VID | Performed by: PHYSICIAN ASSISTANT

## 2023-03-08 PROCEDURE — 99207 PR DROP WITH A PROCEDURE: CPT | Mod: VID | Performed by: DIETITIAN, REGISTERED

## 2023-03-08 RX ORDER — BLOOD-GLUCOSE SENSOR
1 EACH MISCELLANEOUS
Qty: 2 EACH | Refills: 5 | Status: SHIPPED | OUTPATIENT
Start: 2023-03-08 | End: 2024-05-29

## 2023-03-08 RX ORDER — TENOFOVIR DISOPROXIL FUMARATE 300 MG/1
300 TABLET, FILM COATED ORAL DAILY
Qty: 30 TABLET | Refills: 11 | Status: SHIPPED | OUTPATIENT
Start: 2023-03-08 | End: 2023-04-19

## 2023-03-08 RX ORDER — BLOOD-GLUCOSE SENSOR
1 EACH MISCELLANEOUS
Qty: 2 EACH | Refills: 5 | Status: CANCELLED | OUTPATIENT
Start: 2023-03-08

## 2023-03-08 ASSESSMENT — PAIN SCALES - GENERAL: PAINLEVEL: NO PAIN (0)

## 2023-03-08 NOTE — TELEPHONE ENCOUNTER
Central Prior Authorization Team - Phone: [phone]     PA Initiation    Medication: Freestyle Alla 3- PA INITIATED  Insurance Company:    Pharmacy Filling the Rx: [facility] [place], [place] - [address]  Filling Pharmacy Phone: [phone]  Filling Pharmacy Fax:    Start Date: 3/8/2023

## 2023-03-08 NOTE — LETTER
3/8/2023         RE: Ricky Pruitt  [address]  [place] [address]        Dear Colleague,    Thank you for referring your patient, Ricky Pruitt, to the [facility] [place]. Please see a copy of my visit note below.    Video-Visit Details    Type of service:  Video Visit    Joined the call at 3/8/2023, 9:48:46 am.  Left the call at 3/8/2023, 10:25:05 am.  You were on the call for 36 minutes 19 seconds .    Originating Location (pt. Location): Home    Distant Location (provider location):  Off-site    Mode of Communication:  Video Conference via [facility]      Hepatology Clinic note  Ricky Pruitt   Date of Birth 1980  Date of Service 3/8/2023         Assessment/plan:   Ricky Pruitt is a 42 year old male with biopsy-proven ARAUJO and chronic hepatitis B, who has been maintained on tenofovir disoproxil.  Biopsy in 2015 showing stage II fibrosis. Recent transaminases trending up, likely due to being off Ozempic for a few months and most recent hemoglobin A1c 11.6.     # Repeat hepatic panel in 2 months  # US elastography in the next few months to reassess fibrosis    # ARAUJO  - Patient has multiple risk factors making them high risk for on-going fibrosis.   - Recommend FIB-4 yearly with PCP. (FIB-4 Calculation: 1.98 at 3/3/2023)   - Recommend slow gradual weight loss.   - Maintain good control of cholesterol (No contraindication to starting a statin with LFT elevations)   - Optimize blood glucose as needed. Agree with GLP-1 inhibitor for both insulin resistance and help with weight loss  - Improve nutrition: continue limiting carbohydrates, especially simple carbohydrates, and following Mediterranean eating patten  - Increase physical activity as able     #Chronic hepatitis B, suppressed:  - Continue 300 mg tenofovir disoproxil     # Family history of early polyps:   - Screening colonoscopy     # Follow up in six months     Shameka Sosa PA-C   [facility]  Hepatology clinic    Total time for E/M services performed on the date of the encounter 45 minutes.  This included review of previous: clinic visits, hospital records, lab results, imaging studies, and procedural documentation. Time also includes patient visit, documentation and discussion with other providers.  The findings from this review are summarized in the above note.     -----------------------------------------------------       HPI:   Ricky Pruitt is a 42 year old male  who presents to clinic today for the following health issues:     Hepatitis B:   E antigen: 8/22/2015  E antibody: 8/22/2015  -Diagnosed 2011   -Prior biopsy: 10/15/2015, see below  -Prior treatments: on tenofovir since 2010    ARAUJO:  - Biopsy, 10/15/2015: showing steatohepatitis with mild non-bridging pericellular fibrosis and mildly active hepatitis   Risk factors for fatty liver disease includes: obesity, HTN, hyperlipidemia, diabetes    Patient was last seen in clinic in January 2021. Last April, had Bell's Palsy, had symptoms for 4-5 months, but resolved quite well.      Overall, weight is down about 15 lbs in the last few years.  More active in the summer months. Less active during winter.     Currently on Ozempic. Will be seeing DM education in the near future and will be glucose monitoring system.     Patient denies jaundice, lower extremity edema, abdominal distension or confusion.  Patient also denies melena, hematochezia or hematemesis. Patient denies weight loss, fevers, sweats or chills.    No alcohol at all.     Wondering about doing a colonoscopy, reports that sister was found to have a lot of polyps on recent colonoscopy and endoscopist recommended siblings get a colonoscopy as well.     Medical hx Surgical hx   Past Medical History:   Diagnosis Date     Diagnostic skin and sensitization tests (aka ALLERGENS) 5/13/15 skin tests pos. for:  cat/dog/DM/M/T/G/W     Hepatitis B      Seasonal allergic rhinitis      Type 2 diabetes  mellitus without complication, without long-term current use of insulin (H)     Past Surgical History:   Procedure Laterality Date     APPENDECTOMY  2009     [facility] APPENDECTOMY      Description: Appendectomy;  Proc Date: 07/01/2009;                 Physical Exam:   GENERAL: healthy, alert and no distress  EYES: Eyes grossly normal to inspection, conjunctivae and sclerae normal  RESP: no audible wheeze, cough, or visible cyanosis.  No visible retractions or increased work of breathing.  Able to speak fully in complete sentences  NEURO: Cranial nerves grossly intact, mentation intact and speech normal  PSYCH: mentation appears normal, affect normal/bright, judgement and insight intact, normal speech and appearance well-groomed           Data:   Reviewed in person and significant for:    Lab Results   Component Value Date     03/03/2023     12/31/2020      Lab Results   Component Value Date    POTASSIUM 3.7 03/03/2023    POTASSIUM 3.8 08/24/2021    POTASSIUM 3.6 12/31/2020     Lab Results   Component Value Date    CHLORIDE 100 03/03/2023    CHLORIDE 102 08/24/2021    CHLORIDE 103 12/31/2020     Lab Results   Component Value Date    CO2 26 03/03/2023    CO2 28 08/24/2021    CO2 26 12/31/2020     Lab Results   Component Value Date    BUN 13.2 03/03/2023    BUN 14 08/24/2021    BUN 18 12/31/2020     Lab Results   Component Value Date    CR 0.88 03/03/2023    CR 0.79 12/31/2020       Lab Results   Component Value Date    WBC 9.0 03/03/2023    WBC 8.3 12/31/2020     Lab Results   Component Value Date    HGB 18.1 03/03/2023    HGB 16.3 12/31/2020     Lab Results   Component Value Date    HCT 53.2 03/03/2023    HCT 46.3 12/31/2020     Lab Results   Component Value Date    MCV 85 03/03/2023    MCV 84 12/31/2020     Lab Results   Component Value Date     03/03/2023     12/31/2020       Lab Results   Component Value Date     03/03/2023    AST 51 12/31/2020     Lab Results   Component Value Date      03/03/2023     12/31/2020     Lab Results   Component Value Date    BILICONJ 0.0 05/06/2014      Lab Results   Component Value Date    BILITOTAL 1.7 03/03/2023    BILITOTAL 1.4 12/31/2020       Lab Results   Component Value Date    ALBUMIN 4.5 03/03/2023    ALBUMIN 3.9 08/24/2021    ALBUMIN 3.6 12/31/2020     Lab Results   Component Value Date    PROTTOTAL 8.6 03/03/2023    PROTTOTAL 8.6 12/31/2020      Lab Results   Component Value Date    ALKPHOS 134 03/03/2023    ALKPHOS 126 12/31/2020       Lab Results   Component Value Date    INR 1.06 03/03/2023    INR 1.01 12/31/2020       No results found.      Shameka Sosa PA-C

## 2023-03-08 NOTE — NURSING NOTE
Is the patient currently in the state of [place]? YES    Visit mode:VIDEO    If the visit is dropped, the patient can be reconnected by: VIDEO VISIT: Text to cell phone: [phone]    Will anyone else be joining the visit? NO      How would you like to obtain your AVS? MyChart    Are changes needed to the allergy or medication list? NO    Reason for visit: follow up

## 2023-03-08 NOTE — LETTER
3/8/2023         RE: Ricky Pruitt  [address]  [place] [address]        Dear Colleague,    Thank you for referring your patient, Ricky Pruitt, to the [facility]. Please see a copy of my visit note below.    Diabetes Self-Management Education & Support    Presents for: Initial Assessment for new diagnosis    Type of service:  Video Visit    If the video visit is dropped, the video visit invitation should be resent by: Text to cell phone: [phone]    Originating Location (pt. Location): Home  Distant Location (provider location): Home  Mode of Communication:  Video Conference via [employer]    Video Start Time: 1:00  Video End Time (time video stopped): 2:02    How would patient like to obtain AVS? MyChart      Assessment Type:   ASSESSMENT:  Ricky has had DM for a few years but has never seen a diabetes educator. Went to doctor in February and A1C >11%. He reports not having his Ozempic for ~3 months and also was eating more around the holidays. He does have hx of hepatitis B and reports since that diagnosis he has eaten much less rice. He does eat a lot of fruit and feels this may also be contributing to his BG levels. Spent majority of time talking about diet what foods effect BG, reviewed that it is OK to have some cho especially if they are whole foods such as fruit or brown rice but it must be in moderation and ideally paired with protein. Discussed importance of exercise. He is interested in CGM, unfortunately it doesn't appear his insurance covers this. Can for sure get a Alla 3 2 week trial for free and then decide if he wants to pay $75 with voucher to get 2 sensors, could wear on occasion to get better BG data. If not wearing discussed importance of checking at different times of the day. We have f/u appt next month. He is currently on Ozempic, wanted to start at 0.75mg dose since he had not been on it for a while.     Patient's most recent   Lab Results  "  Component Value Date    A1C 11.6 02/02/2023    A1C 7.6 12/31/2020     is not meeting goal of <7.0    Diabetes knowledge and skills assessment:   Patient is knowledgeable in diabetes management concepts related to: Taking Medication    Continue education with the following diabetes management concepts: Healthy Eating, Being Active, Monitoring, Taking Medication, Problem Solving, Reducing Risks and Healthy Coping    Based on learning assessment above, most appropriate setting for further diabetes education would be: Individual setting.      PLAN  -Get Alla 3, even if it is just for 2 weeks to use as data   -Check BG at varying times of the day  -Continue Ozempic  -Follow up next month    Topics to cover at upcoming visits: Healthy Eating, Being Active, Monitoring, Taking Medication, Problem Solving, Reducing Risks and Healthy Coping    Follow-up: next month    See Care Plan for co-developed, patient-state behavior change goals.  AVS provided for patient today.    Education Materials Provided:  [employer] Healthy Living with Diabetes Book and Protein sources of food      SUBJECTIVE/OBJECTIVE:  Presents for: Initial Assessment for new diagnosis  Accompanied by: Self  Diabetes type: Type 2, Other  Date of diagnosis: 2020  Cultural Influences/Ethnic Background:  Not  or     Diabetes Symptoms & Complications:  Patient Problem List and Family Medical History reviewed for relevant medical history, current medical status, and diabetes risk factors.    Vitals:  There were no vitals taken for this visit.  Estimated body mass index is 30.73 kg/m  as calculated from the following:    Height as of 2/16/23: 1.778 m (5' 10\").    Weight as of 2/16/23: 97.2 kg (214 lb 3.2 oz).   Last 3 BP:   BP Readings from Last 3 Encounters:   02/16/23 118/76   03/18/22 122/84   03/15/22 128/85       History   Smoking Status     Never   Smokeless Tobacco     Never       Labs:  Lab Results   Component Value Date    A1C 11.6 " 02/02/2023    A1C 7.6 12/31/2020     Lab Results   Component Value Date     03/03/2023     08/24/2021     12/31/2020     Lab Results   Component Value Date     02/02/2023     12/31/2020     HDL Cholesterol   Date Value Ref Range Status   12/31/2020 38 (L) >39 mg/dL Final     Direct Measure HDL   Date Value Ref Range Status   02/02/2023 39 (L) >=40 mg/dL Final   ]  GFR Estimate   Date Value Ref Range Status   03/03/2023 >90 >60 mL/min/1.73m2 Final     Comment:     eGFR calculated using 2021 CKD-EPI equation.   12/31/2020 >90 >60 mL/min/[1.73_m2] Final     Comment:     Non  GFR Calc  Starting 12/18/2018, serum creatinine based estimated GFR (eGFR) will be   calculated using the Chronic Kidney Disease Epidemiology Collaboration   (CKD-EPI) equation.       GFR Estimate If Black   Date Value Ref Range Status   12/31/2020 >90 >60 mL/min/[1.73_m2] Final     Comment:      GFR Calc  Starting 12/18/2018, serum creatinine based estimated GFR (eGFR) will be   calculated using the Chronic Kidney Disease Epidemiology Collaboration   (CKD-EPI) equation.       Lab Results   Component Value Date    CR 0.88 03/03/2023    CR 0.79 12/31/2020     No results found for: MICROALBUMIN    Healthy Eating:  Healthy Eating Assessed Today: Yes  Meals include: Breakfast, Lunch, Dinner  Breakfast: 1 medium zuchini wth garlic stir [place] + 2 ribs pork tips + brown rice (1/2 cup) OR sumo mandarin (25g cho) OR typical breakfast-fruits banana + grapes + apple cinnamon instant only OR breakfast sandwich 2 multigrain bread + turkey + egg + avocado.  Lunch: zuchini wth garlic stir [place] + pork tips + brown rice (1/2 cup) OR papaya salad, pork intestines with pork ears salt/pepper/herbs + donut cream filling. Protein (pork/beef/chicken/seafood) + vegetables (boiled chinese brocolli)+ sesame oil salt  Dinner: zuchini wth garlic stir [place] + pork tips + brown rice (1/2 cup) OR pork belly + drum  stick + 2 bites of fruit cake  Snacks: has cut soda and ice cream. Grapes OR banana OR watermelon. Holiday season was hard-eating more.  Other: * cut out white rice ~4 years ago to hepatitis  Beverages: Water    Being Active:  Being Active Assessed Today: Yes  Exercise:: Currently not exercising  Barrier to exercise: None    Monitoring:  Monitoring Assessed Today: No    Taking Medications:  Diabetes Medication(s)     Incretin Mimetic Agents       OZEMPIC, 0.25 OR 0.5 MG/DOSE, 2 MG/1.5ML SOPN pen    INJECT 0.75 MG SUBCUTANEOUS ONCE A WEEK (TWO SEPARATE INJECTIONS - ONE OF  0.25MG AND ONE OF 0.5MG)     semaglutide (OZEMPIC) 2 MG/1.5ML SOPN pen    Inject 0.75 mg Subcutaneous every 7 days for 60 days          Taking Medication Assessed Today: Yes  Current Treatments: Diet, Non-insulin Injectables    Problem Solving:  Problem Solving Assessed Today: No    Reducing Risks:  Reducing Risks Assessed Today: No    Healthy Coping:  Healthy Coping Assessed Today: Yes  Emotional response to diabetes: Ready to learn  Informal Support system:: Family  Stage of change: ACTION (Actively working towards change)  Patient Activation Measure Survey Score:  No flowsheet data found.      Care Plan and Education Provided:  Care Plan: Diabetes   Updates made by Alexandrea Alcala since 3/8/2023 12:00 AM      Problem: HbA1C Not In Goal       Goal: Establish Regular Follow-Ups with PCP       Task: Discuss with PCP the recommended timing for patient's next follow up visit(s)    Responsible User: Alexandrea Alcala      Task: Discuss schedule for PCP visits with patient    Responsible User: Alexanrdea Alcala      Goal: Get HbA1C Level in Goal       Task: Educate patient on diabetes education self-management topics    Responsible User: Alexandrea Alcala      Task: Educate patient on benefits of regular glucose monitoring Completed 3/8/2023   Responsible User: Alexandrea Alcala      Task: Refer patient to appropriate extended care team member, as needed  (Medication Therapy Management, Behavioral Health, Physical Therapy, etc.)    Responsible User: Alexandrea Alcala      Task: Discuss diabetes treatment plan with patient    Responsible User: Alexandrea Alcala      Problem: Diabetes Self-Management Education Needed to Optimize Self-Care Behaviors       Goal: Understand diabetes pathophysiology and disease progression       Task: Provide education on diabetes pathophysiology and disease progression specfic to patient's diabetes type Completed 3/8/2023   Responsible User: Alexandrea Alcala      Goal: Healthy Eating - follow a healthy eating pattern for diabetes       Task: Provide education on portion control and consistency in amount, composition and timing of food intake    Responsible User: Alexandrea Alcala      Task: Provide education on managing carbohydrate intake (carbohydrate counting, plate planning method, etc.) Completed 3/8/2023   Responsible User: Alexandrea Alcala      Task: Provide education on weight management    Responsible User: Alexandrea Alcala      Task: Provide education on heart healthy eating    Responsible User: Alexandrea Alcala      Task: Provide education on eating out    Responsible User: Alexandrea Alcala      Task: Develop individualized healthy eating plan with patient    Responsible User: Alexandrea Alcala      Goal: Being Active - get regular physical activity, working up to at least 150 minutes per week       Task: Provide education on relationship of activity to glucose and precautions to take if at risk for low glucose Completed 3/8/2023   Responsible User: Alexandrea Alcala      Task: Discuss barriers to physical activity with patient    Responsible User: Alexandrea Alcala      Task: Develop physical activity plan with patient    Responsible User: Alexandrea Alcala      Task: Explore community resources including walking groups, assistance programs, and home videos    Responsible User: Alexandrea Alcala      Goal: Monitoring - monitor glucose and ketones  as directed    This Visit's Progress: 0%   Note:    I will test my blood sugar at varying times of the day.     Task: Provide education on blood glucose monitoring (purpose, proper technique, frequency, glucose targets, interpreting results, when to use glucose control solution, sharps disposal)    Responsible User: Alexandrea Alcala      Task: Provide education on continuous glucose monitoring (sensor placement, use of nadia or /reader, understanding glucose trends, alerts and alarms, differences between sensor glucose and blood glucose) Completed 3/8/2023   Responsible User: Alexandrea Alcala      Task: Provide education on ketone monitoring (when to monitor, frequency, etc.)    Responsible User: Alexandrea Alcala      Goal: Taking Medication - patient is consistently taking medications as directed       Task: Provide education on action of prescribed medication, including when to take and possible side effects    Responsible User: Alexandrea Alcala      Task: Provide education on insulin and injectable diabetes medications, including administration, storage, site selection and rotation for injection sites    Responsible User: Alexandrea Alcala      Task: Discuss barriers to medication adherence with patient and provide management technique ideas as appropriate    Responsible User: Alexandrea Alcala      Task: Provide education on frequency and refill details of medications    Responsible User: Alexandrea Alcala      Goal: Problem Solving - know how to prevent and manage short-term diabetes complications       Task: Provide education on high blood glucose - causes, signs/symptoms, prevention and treatment    Responsible User: Alexandrea Alcala      Task: Provide education on low blood glucose - causes, signs/symptoms, prevention, treatment, carrying a carbohydrate source at all times, and medical identification    Responsible User: Alexandrea Alcala      Task: Provide education on safe travel with diabetes    Responsible  User: Alexandrea Alcala      Task: Provide education on how to care for diabetes on sick days    Responsible User: Alexandrea Alcala      Task: Provide education on when to call a health care provider    Responsible User: Alexandrea Alcala      Goal: Reducing Risks - know how to prevent and treat long-term diabetes complications       Task: Provide education on major complications of diabetes, prevention, early diagnostic measures and treatment of complications    Responsible User: Alexandrea Alcala      Task: Provide education on recommended care for dental, eye and foot health    Responsible User: Alexandrea Alcala      Task: Provide education on Hemoglobin A1c - goals and relationship to blood glucose levels Completed 3/8/2023   Responsible User: Alexandrea Alcala      Task: Provide education on recommendations for heart health - lipid levels and goals, blood pressure and goals, and aspirin therapy, if indicated    Responsible User: Alexandrea Alcala      Task: Provide education on tobacco cessation    Responsible User: Alexandrea Alcala      Goal: Healthy Coping - use available resources to cope with the challenges of managing diabetes       Task: Discuss recognizing feelings about having diabetes    Responsible User: Alexandrea Alcala      Task: Provide education on the benefits of making appropriate lifestyle changes    Responsible User: Alexandrea Alcala      Task: Provide education on benefits of utilizing support systems    Responsible User: Alexandrea Alcala      Task: Discuss methods for coping with stress    Responsible User: Alexandrea Alcala      Task: Provide education on when to seek professional counseling    Responsible User: Alexandrea Alcala RD, LD, [facility]    Time Spent: 60 minutes  Encounter Type: Individual    Any diabetes medication dose changes were made via the CDE Protocol per the patient's primary care provider. A copy of this encounter was shared with the provider.

## 2023-03-08 NOTE — PROGRESS NOTES
Diabetes Self-Management Education & Support    Presents for: Initial Assessment for new diagnosis    Type of service:  Video Visit    If the video visit is dropped, the video visit invitation should be resent by: Text to cell phone: [phone]    Originating Location (pt. Location): Home  Distant Location (provider location): Home  Mode of Communication:  Video Conference via [employer]    Video Start Time: 1:00  Video End Time (time video stopped): 2:02    How would patient like to obtain AVS? Sarah      Assessment Type:   ASSESSMENT:  Ricky has had DM for a few years but has never seen a diabetes educator. Went to doctor in February and A1C >11%. He reports not having his Ozempic for ~3 months and also was eating more around the holidays. He does have hx of hepatitis B and reports since that diagnosis he has eaten much less rice. He does eat a lot of fruit and feels this may also be contributing to his BG levels. Spent majority of time talking about diet what foods effect BG, reviewed that it is OK to have some cho especially if they are whole foods such as fruit or brown rice but it must be in moderation and ideally paired with protein. Discussed importance of exercise. He is interested in CGM, unfortunately it doesn't appear his insurance covers this. Can for sure get a Alla 3 2 week trial for free and then decide if he wants to pay $75 with voucher to get 2 sensors, could wear on occasion to get better BG data. If not wearing discussed importance of checking at different times of the day. We have f/u appt next month. He is currently on Ozempic, wanted to start at 0.75mg dose since he had not been on it for a while.     Patient's most recent   Lab Results   Component Value Date    A1C 11.6 02/02/2023    A1C 7.6 12/31/2020     is not meeting goal of <7.0    Diabetes knowledge and skills assessment:   Patient is knowledgeable in diabetes management concepts related to: Taking Medication    Continue  "education with the following diabetes management concepts: Healthy Eating, Being Active, Monitoring, Taking Medication, Problem Solving, Reducing Risks and Healthy Coping    Based on learning assessment above, most appropriate setting for further diabetes education would be: Individual setting.      PLAN  -Get Alla 3, even if it is just for 2 weeks to use as data   -Check BG at varying times of the day  -Continue Ozempic  -Follow up next month    Topics to cover at upcoming visits: Healthy Eating, Being Active, Monitoring, Taking Medication, Problem Solving, Reducing Risks and Healthy Coping    Follow-up: next month    See Care Plan for co-developed, patient-state behavior change goals.  AVS provided for patient today.    Education Materials Provided:  [employer] Healthy Living with Diabetes Book and Protein sources of food      SUBJECTIVE/OBJECTIVE:  Presents for: Initial Assessment for new diagnosis  Accompanied by: Self  Diabetes type: Type 2, Other  Date of diagnosis: 2020  Cultural Influences/Ethnic Background:  Not  or     Diabetes Symptoms & Complications:  Patient Problem List and Family Medical History reviewed for relevant medical history, current medical status, and diabetes risk factors.    Vitals:  There were no vitals taken for this visit.  Estimated body mass index is 30.73 kg/m  as calculated from the following:    Height as of 2/16/23: 1.778 m (5' 10\").    Weight as of 2/16/23: 97.2 kg (214 lb 3.2 oz).   Last 3 BP:   BP Readings from Last 3 Encounters:   02/16/23 118/76   03/18/22 122/84   03/15/22 128/85       History   Smoking Status     Never   Smokeless Tobacco     Never       Labs:  Lab Results   Component Value Date    A1C 11.6 02/02/2023    A1C 7.6 12/31/2020     Lab Results   Component Value Date     03/03/2023     08/24/2021     12/31/2020     Lab Results   Component Value Date     02/02/2023     12/31/2020     HDL Cholesterol   Date " Value Ref Range Status   12/31/2020 38 (L) >39 mg/dL Final     Direct Measure HDL   Date Value Ref Range Status   02/02/2023 39 (L) >=40 mg/dL Final   ]  GFR Estimate   Date Value Ref Range Status   03/03/2023 >90 >60 mL/min/1.73m2 Final     Comment:     eGFR calculated using 2021 CKD-EPI equation.   12/31/2020 >90 >60 mL/min/[1.73_m2] Final     Comment:     Non  GFR Calc  Starting 12/18/2018, serum creatinine based estimated GFR (eGFR) will be   calculated using the Chronic Kidney Disease Epidemiology Collaboration   (CKD-EPI) equation.       GFR Estimate If Black   Date Value Ref Range Status   12/31/2020 >90 >60 mL/min/[1.73_m2] Final     Comment:      GFR Calc  Starting 12/18/2018, serum creatinine based estimated GFR (eGFR) will be   calculated using the Chronic Kidney Disease Epidemiology Collaboration   (CKD-EPI) equation.       Lab Results   Component Value Date    CR 0.88 03/03/2023    CR 0.79 12/31/2020     No results found for: MICROALBUMIN    Healthy Eating:  Healthy Eating Assessed Today: Yes  Meals include: Breakfast, Lunch, Dinner  Breakfast: 1 medium zuchini wth garlic stir [place]la + 2 ribs pork tips + brown rice (1/2 cup) OR sumo mandarin (25g cho) OR typical breakfast-fruits banana + grapes + apple cinnamon instant only OR breakfast sandwich 2 multigrain bread + turkey + egg + avocado.  Lunch: zuchini wth garlic stir [place] + pork tips + brown rice (1/2 cup) OR papaya salad, pork intestines with pork ears salt/pepper/herbs + donut cream filling. Protein (pork/beef/chicken/seafood) + vegetables (boiled chinese brocolli)+ sesame oil salt  Dinner: zuchini wth garlic stir ivllela + pork tips + brown rice (1/2 cup) OR pork belly + drum stick + 2 bites of fruit cake  Snacks: has cut soda and ice cream. Grapes OR banana OR watermelon. Holiday season was hard-eating more.  Other: * cut out white rice ~4 years ago to hepatitis  Beverages: Water    Being Active:  Being Active Assessed  Today: Yes  Exercise:: Currently not exercising  Barrier to exercise: None    Monitoring:  Monitoring Assessed Today: No    Taking Medications:  Diabetes Medication(s)     Incretin Mimetic Agents       OZEMPIC, 0.25 OR 0.5 MG/DOSE, 2 MG/1.5ML SOPN pen    INJECT 0.75 MG SUBCUTANEOUS ONCE A WEEK (TWO SEPARATE INJECTIONS - ONE OF  0.25MG AND ONE OF 0.5MG)     semaglutide (OZEMPIC) 2 MG/1.5ML SOPN pen    Inject 0.75 mg Subcutaneous every 7 days for 60 days          Taking Medication Assessed Today: Yes  Current Treatments: Diet, Non-insulin Injectables    Problem Solving:  Problem Solving Assessed Today: No    Reducing Risks:  Reducing Risks Assessed Today: No    Healthy Coping:  Healthy Coping Assessed Today: Yes  Emotional response to diabetes: Ready to learn  Informal Support system:: Family  Stage of change: ACTION (Actively working towards change)  Patient Activation Measure Survey Score:  No flowsheet data found.      Care Plan and Education Provided:  Care Plan: Diabetes   Updates made by Alexandrea Alcala since 3/8/2023 12:00 AM      Problem: HbA1C Not In Goal       Goal: Establish Regular Follow-Ups with PCP       Task: Discuss with PCP the recommended timing for patient's next follow up visit(s)    Responsible User: Alexandrea Alcala      Task: Discuss schedule for PCP visits with patient    Responsible User: Alexandrea Alcala      Goal: Get HbA1C Level in Goal       Task: Educate patient on diabetes education self-management topics    Responsible User: Alexandrea Alcala      Task: Educate patient on benefits of regular glucose monitoring Completed 3/8/2023   Responsible User: Alexandrea Alcala      Task: Refer patient to appropriate extended care team member, as needed (Medication Therapy Management, Behavioral Health, Physical Therapy, etc.)    Responsible User: Alexandrea Alcala      Task: Discuss diabetes treatment plan with patient    Responsible User: Alexandrea Alcala      Problem: Diabetes Self-Management Education  Needed to Optimize Self-Care Behaviors       Goal: Understand diabetes pathophysiology and disease progression       Task: Provide education on diabetes pathophysiology and disease progression specfic to patient's diabetes type Completed 3/8/2023   Responsible User: Alexandrea Alcala      Goal: Healthy Eating - follow a healthy eating pattern for diabetes       Task: Provide education on portion control and consistency in amount, composition and timing of food intake    Responsible User: Alexandrea Alcala      Task: Provide education on managing carbohydrate intake (carbohydrate counting, plate planning method, etc.) Completed 3/8/2023   Responsible User: Alexandrea Alcala      Task: Provide education on weight management    Responsible User: Alexandrea Alcala      Task: Provide education on heart healthy eating    Responsible User: Alexandrea Alcala      Task: Provide education on eating out    Responsible User: Alexandrea Alcala      Task: Develop individualized healthy eating plan with patient    Responsible User: Alexandrea Alcala      Goal: Being Active - get regular physical activity, working up to at least 150 minutes per week       Task: Provide education on relationship of activity to glucose and precautions to take if at risk for low glucose Completed 3/8/2023   Responsible User: Alexandrea Alcala      Task: Discuss barriers to physical activity with patient    Responsible User: Alexandrea Alcala      Task: Develop physical activity plan with patient    Responsible User: Alexandrea Alcala      Task: Explore community resources including walking groups, assistance programs, and home videos    Responsible User: Alexandrea Alcala      Goal: Monitoring - monitor glucose and ketones as directed    This Visit's Progress: 0%   Note:    I will test my blood sugar at varying times of the day.     Task: Provide education on blood glucose monitoring (purpose, proper technique, frequency, glucose targets, interpreting results, when to use  glucose control solution, sharps disposal)    Responsible User: Alexandrea Alcala      Task: Provide education on continuous glucose monitoring (sensor placement, use of nadia or /reader, understanding glucose trends, alerts and alarms, differences between sensor glucose and blood glucose) Completed 3/8/2023   Responsible User: Alexandrea Alcala      Task: Provide education on ketone monitoring (when to monitor, frequency, etc.)    Responsible User: Alexandrea Alcala      Goal: Taking Medication - patient is consistently taking medications as directed       Task: Provide education on action of prescribed medication, including when to take and possible side effects    Responsible User: Alexandrea Alcala      Task: Provide education on insulin and injectable diabetes medications, including administration, storage, site selection and rotation for injection sites    Responsible User: Alexandrea Alcala      Task: Discuss barriers to medication adherence with patient and provide management technique ideas as appropriate    Responsible User: Alexandrea Alcala      Task: Provide education on frequency and refill details of medications    Responsible User: Alexandrea Alcala      Goal: Problem Solving - know how to prevent and manage short-term diabetes complications       Task: Provide education on high blood glucose - causes, signs/symptoms, prevention and treatment    Responsible User: Alexandrea Alcala      Task: Provide education on low blood glucose - causes, signs/symptoms, prevention, treatment, carrying a carbohydrate source at all times, and medical identification    Responsible User: Alexandrea Alcala      Task: Provide education on safe travel with diabetes    Responsible User: Alexandrea Alcala      Task: Provide education on how to care for diabetes on sick days    Responsible User: Alexandrea Alcala      Task: Provide education on when to call a health care provider    Responsible User: Alexandrea Alcala      Goal: Reducing  Risks - know how to prevent and treat long-term diabetes complications       Task: Provide education on major complications of diabetes, prevention, early diagnostic measures and treatment of complications    Responsible User: Alexandrea Alcala      Task: Provide education on recommended care for dental, eye and foot health    Responsible User: Alexandrea Alcala      Task: Provide education on Hemoglobin A1c - goals and relationship to blood glucose levels Completed 3/8/2023   Responsible User: Alexandrea Alcala      Task: Provide education on recommendations for heart health - lipid levels and goals, blood pressure and goals, and aspirin therapy, if indicated    Responsible User: Alexandrea Alcala      Task: Provide education on tobacco cessation    Responsible User: Alexandrea Alcala      Goal: Healthy Coping - use available resources to cope with the challenges of managing diabetes       Task: Discuss recognizing feelings about having diabetes    Responsible User: Alexandrea Alcala      Task: Provide education on the benefits of making appropriate lifestyle changes    Responsible User: Alexandrea Alcala      Task: Provide education on benefits of utilizing support systems    Responsible User: Alexandrea Alcala      Task: Discuss methods for coping with stress    Responsible User: Alexandrea Alcala      Task: Provide education on when to seek professional counseling    Responsible User: Alexandrea Alcala RD, LD, [facility]    Time Spent: 60 minutes  Encounter Type: Individual    Any diabetes medication dose changes were made via the CDE Protocol per the patient's primary care provider. A copy of this encounter was shared with the provider.

## 2023-03-08 NOTE — TELEPHONE ENCOUNTER
Ricky called and reports CGM is covered through DME and provided information for [employer] medical supplies: [phone].     Called DME company. They report in order to process this information they need the following to be faxed to: [phone]    -Need prescription from MD  -Prior authorization request form that can be found on the [employer] website.      Will request endocrinology liaison pool to assist with this.     Alexandrea Alcala RD, LD, [facility]

## 2023-03-08 NOTE — PATIENT INSTRUCTIONS
"Goals for Diabetes:    1. Check blood sugars at varying times: before meals, after meals and bedtime  Blood Glucose Targets:  -Fasting and before meal target is 80 - 130  -2 hours after a meal target is < 180  -Time in range for continuous glucose monitor (Alla OR Dexcom): at least 70% BG numbers between .  Always remember to bring meter and/or log book to all appointments.    2.  Activity really helps improve blood sugars. Try to Incorporate 30 minutes activity into each day - does not need to be all at one time & walking counts!    3. Eat three balanced meals each day, think of plate method, aiming for multiple food groups including 1/4 plate carbohydrates. Focus on \"high quality\" carbohydrates (whole grains, starchy vegetables,fruits, beans/legumes,etc). Limit added sugar as much as possible, read labels to find this, will see \"includes #grams added sugar. Goal daily intake <25g for women and <35g for men. Fruit is OK but watches portions: can have a medium banana OR 15-20 medium grapes OR 1-1.5 cups watermelon, pair with protein if able to not spike blood sugars as much    4. Take diabetes medications as prescribed   -Ozempic    5. If you are overweight aim for a 5% weight loss, this will improve blood sugars, cholesterol, and blood pressure!     Follow up with your Diabetic Educator as needed to assess BG targets and need for modifications to medications and/or lifestyle.    Call or Mychart with any questions.      Thank you!  Alexandrea Alcala RD, LD, [facility]  Certified Diabetes Care &   Outpatient [facility] Diabetes Education and Nutrition Services for the [facility]:  For Your Diabetes Education or Nutrition Appointments Call:  [phone]   For Diabetes Education and Nutrition Related Questions:   Phone: [phone]  Send MyChart Message   If you need a medication refill please contact your pharmacy. Please allow 3 " "business days for your refills to be completed.     Freestyle Alla:  -If you would like to use your phone as the Alla reader, check to see if your mary store has that option (some Androids do not, etc). Search \"Alla 3 mary\", the mary is a yellow icon with round [place] and #3 on it. If you can not get this that is ok you will just need to use the reader that can be prescribed for you.  -There are directions for how to use the Alla in the sensor box and reader box. If you download the mary they are also there. You can also go onto the Alla website here for tutorial on how to use: https://www.[employer]/us-en/how-to-set-up.html  -If you have issues with it falling off, you can buy patches to go over it. Check out Amazon, search \"Alla 3 patches\" or go to skingrip.com  -You can adjust the alarm settings on the mary by turning off/on or customizing the alerts. FYI- you can NOT turn off the urgent low glucose alarm. In rare occasions this may go off incorrectly if you are laying on the sensor.   -To hook up to [employer] (this is so I can see your blood sugar numbers)  Connecting to the Clinic on the Alla Mary:    Step 1: Open the Settings Menu.    Click the three blue lines (upper left corner) to open the Settings Menu.    Step 2: Click Connected Apps.    Step 3: Click Connect to [facility].    Step 4: Connect to a Practice:    Step 5: Enter practice ID: [id number] click next    You should now see your healthcare practice or clinic name appear in your Linked Practices list. This means you have successfully linked accounts and your healthcare professional now has access to all your glucose data.    Discounts for Freestyle Alla  Free trial- https://[email or web address]/us-en/rambo.html  Cash-Scripts go through the local pharmacy. Patient can take advantage of the automatic voucher which will get the payment down to $75 or less. If they are quoted anything higher than $75 please direct the patient to call for " the voucher number.  [employer] Voucher [phone]  Commercial- Scripts go through the local pharmacy. Patient can take advantage of the automatic voucher which will get the co-pay down to $75 or less. If they are quoted anything higher than $75 please direct the patient to call for the voucher number.  [employer] Voucher [phone]

## 2023-03-08 NOTE — TELEPHONE ENCOUNTER
Prior Authorization Retail Medication Request    Medication/Dose: Freestyle Alla 3 Sensors  ICD code (if different than what is on RX):  na  Previously Tried and Failed:  na  Rationale:  na    Insurance Name:  [employer] commercial  Insurance ID:  [id number]      Pharmacy Information (if different than what is on RX)  Name:  [facility]  Phone:  [phone]

## 2023-03-09 NOTE — TELEPHONE ENCOUNTER
Central Prior Authorization Team - Phone: [phone]     PRIOR AUTHORIZATION DENIED    Medication: Freestyle Alla 3- PA DENIED    Denial Date: 3/8/2023    Denial Rational:                                                             Appeal Information:  If the provider would like to appeal, please provide a letter of medical necessity and route back to the team. Otherwise you can close the encounter. Thank you, Central PA Team

## 2023-03-10 ENCOUNTER — TELEPHONE (OUTPATIENT)
Dept: GASTROENTEROLOGY | Facility: CLINIC | Age: 43
End: 2023-03-10
Payer: COMMERCIAL

## 2023-03-10 NOTE — TELEPHONE ENCOUNTER
Left Voicemail (1st Attempt) for the patient to call back and schedule the following:    Appointment type: 6 month follow up video  Provider: Shameka Sosa PA-C  Return date: September 2023  Specialty phone number: n/a  Additional appointment(s) needed: n/a  Additonal Notes: n/a

## 2023-03-16 NOTE — TELEPHONE ENCOUNTER
Connie Roman DO  P [facility]  Caller: Unspecified (1 week ago)  Would we be able to fax over script? Thank you!     CONNIE ROMAN, DO

## 2023-03-16 NOTE — TELEPHONE ENCOUNTER
Rx for Continuous Blood Gluc Sensor (FREESTYLE RADHA 3 SENSOR) [facility] faxed to [phone]

## 2023-03-31 ENCOUNTER — TELEPHONE (OUTPATIENT)
Dept: EDUCATION SERVICES | Facility: CLINIC | Age: 43
End: 2023-03-31
Payer: COMMERCIAL

## 2023-03-31 NOTE — TELEPHONE ENCOUNTER
Called patient back.  He will call [facility] to confirm they have the documentation and then contact our team if it is not covered or they are missing pieces.    Will mychart this phone number per patient request.     Latonya Myers MS, RD, LD, CDE

## 2023-03-31 NOTE — TELEPHONE ENCOUNTER
Pt discussed CGM with Purnima ORTA Before she went on leave, he is not sure how he should proceed. He asked if someone on the Diabetes care team could call him to advise.  He declined to reschedule his appt at this time.      Scotty  [facility] Specialist  Diabetes & Nutrition Scheduling  [phone]

## 2023-04-05 ENCOUNTER — TELEPHONE (OUTPATIENT)
Dept: FAMILY MEDICINE | Facility: CLINIC | Age: 43
End: 2023-04-05
Payer: COMMERCIAL

## 2023-04-05 DIAGNOSIS — E11.9 TYPE 2 DIABETES MELLITUS WITHOUT COMPLICATION, WITHOUT LONG-TERM CURRENT USE OF INSULIN (H): Primary | ICD-10-CM

## 2023-04-05 NOTE — TELEPHONE ENCOUNTER
Mfg has reformulated the concentration of Ozempic as of March 2023. The 0.25mg and 0.5mg dose pen is now  available conc 2mg/3ml (prev 2mg/1.5ml) Please send new order to reflect change. Requesting 0.75mg subcutaneously once weekly. Written qnty 12ml will provide a 70 day supply.  Please send new order if appropriate.    Dorothea Chakraborty [facility]  [phone]

## 2023-04-06 ENCOUNTER — TELEPHONE (OUTPATIENT)
Dept: GASTROENTEROLOGY | Facility: CLINIC | Age: 43
End: 2023-04-06
Payer: COMMERCIAL

## 2023-04-06 NOTE — TELEPHONE ENCOUNTER
Left Voicemail (2nd Attempt) for the patient to call back and schedule the following:    Appointment type: Return Liver  Provider: Shameka Sosa PA-C  Return date: around 9.26.23  Specialty phone number: [phone]  Additional appointment(s) needed: labs prior to visit & ultrasound in June  Additonal Notes:     Second attempt, AN 4.6.23

## 2023-04-10 ENCOUNTER — VIRTUAL VISIT (OUTPATIENT)
Dept: FAMILY MEDICINE | Facility: CLINIC | Age: 43
End: 2023-04-10
Payer: COMMERCIAL

## 2023-04-10 ENCOUNTER — LAB (OUTPATIENT)
Dept: FAMILY MEDICINE | Facility: CLINIC | Age: 43
End: 2023-04-10
Attending: FAMILY MEDICINE
Payer: COMMERCIAL

## 2023-04-10 DIAGNOSIS — J06.9 VIRAL URI: Primary | ICD-10-CM

## 2023-04-10 DIAGNOSIS — J02.0 STREPTOCOCCAL PHARYNGITIS: ICD-10-CM

## 2023-04-10 DIAGNOSIS — J06.9 VIRAL URI: ICD-10-CM

## 2023-04-10 LAB
DEPRECATED S PYO AG THROAT QL EIA: POSITIVE
FLUAV AG SPEC QL IA: NEGATIVE
FLUBV AG SPEC QL IA: NEGATIVE

## 2023-04-10 PROCEDURE — 99207 PR NO CHARGE NURSE ONLY: CPT

## 2023-04-10 PROCEDURE — 87804 INFLUENZA ASSAY W/OPTIC: CPT | Mod: VID | Performed by: FAMILY MEDICINE

## 2023-04-10 PROCEDURE — U0005 INFEC AGEN DETEC AMPLI PROBE: HCPCS

## 2023-04-10 PROCEDURE — U0003 INFECTIOUS AGENT DETECTION BY NUCLEIC ACID (DNA OR RNA); SEVERE ACUTE RESPIRATORY SYNDROME CORONAVIRUS 2 (SARS-COV-2) (CORONAVIRUS DISEASE [COVID-19]), AMPLIFIED PROBE TECHNIQUE, MAKING USE OF HIGH THROUGHPUT TECHNOLOGIES AS DESCRIBED BY CMS-2020-01-R: HCPCS

## 2023-04-10 PROCEDURE — 87880 STREP A ASSAY W/OPTIC: CPT | Mod: VID | Performed by: FAMILY MEDICINE

## 2023-04-10 PROCEDURE — 99213 OFFICE O/P EST LOW 20 MIN: CPT | Mod: VID | Performed by: FAMILY MEDICINE

## 2023-04-10 RX ORDER — AMOXICILLIN 500 MG/1
500 CAPSULE ORAL 2 TIMES DAILY
Qty: 20 CAPSULE | Refills: 0 | Status: SHIPPED | OUTPATIENT
Start: 2023-04-10 | End: 2023-04-20

## 2023-04-10 RX ORDER — SEMAGLUTIDE 0.68 MG/ML
0.75 INJECTION, SOLUTION SUBCUTANEOUS
Qty: 12 ML | Refills: 0 | Status: SHIPPED | OUTPATIENT
Start: 2023-04-10 | End: 2024-05-29

## 2023-04-10 ASSESSMENT — ENCOUNTER SYMPTOMS: FEVER: 1

## 2023-04-10 NOTE — PROGRESS NOTES
Ricky is a 42 year old who is being evaluated via a billable video visit.      How would you like to obtain your AVS? MyChart  If the video visit is dropped, the invitation should be resent by: Text to cell phone: [phone]  Will anyone else be joining your video visit? No          Assessment & Plan     Viral URI  Check COVID-19, influenza and strep.  Discussed if positive for strep would recommend antibiotic therapy if viral recommend continue Tylenol, Aleve, salt water gargles.  Currently do not suspect pneumonia.  - Symptomatic COVID-19 Virus (Coronavirus) by PCR Nose  - Influenza A & B Antigen - Clinic Collect  - Streptococcus A Rapid Screen w/Reflex to PCR - Clinic Collect        Marcelo Silveira MD  [facility]   Ricky is a 42 year old, presenting for the following health issues:  URI (Uri symptoms x2-3 days, c/o ST and fever)         View : No data to display.              History of Present Illness       Reason for visit:  Sore throat, hurts to swallow, fever, chills  Symptom onset:  1-3 days ago  Symptoms include:  Sore throat, hurts to swallow, fever, chills  Symptom intensity:  Moderate  Symptom progression:  Worsening  Had these symptoms before:  No    He eats 2-3 servings of fruits and vegetables daily.He consumes 0 sweetened beverage(s) daily.He exercises with enough effort to increase his heart rate 9 or less minutes per day.  He exercises with enough effort to increase his heart rate 3 or less days per week.   He is taking medications regularly.       Acute Illness  Acute illness concerns: ST and fever  Onset/Duration: x2-3 days  Symptoms:  Fever: YES  Chills/Sweats: YES  Headache (location?): No  Sinus Pressure: No  Conjunctivitis:  No  Ear Pain: no  Rhinorrhea: No  Congestion: No  Sore Throat: YES  Cough: YES  Wheeze: No  Decreased Appetite: No  Nausea: No  Vomiting: No  Diarrhea: YES  Dysuria/Freq.: No  Dysuria or Hematuria: No  Fatigue/Achiness:  "YES  Sick/Strep Exposure: No  Therapies tried and outcome: OTC cold medications, chloraseptic spray          Review of Systems   Constitutional: Positive for fever.            Objective    Vitals - Patient Reported  Weight (Patient Reported): 97.5 kg (215 lb)  Height (Patient Reported): 175.3 cm (5' 9\")  BMI (Based on Pt Reported Ht/Wt): 31.75      Vitals:  No vitals were obtained today due to virtual visit.    Physical Exam   GENERAL: ill, alert and no distress  EYES: Eyes grossly normal to inspection.  No discharge or erythema, or obvious scleral/conjunctival abnormalities.  RESP: No audible wheeze, cough, or visible cyanosis.  No visible retractions or increased work of breathing.    SKIN: Visible skin clear. No significant rash, abnormal pigmentation or lesions.  NEURO: Cranial nerves grossly intact.  Mentation and speech appropriate for age.  PSYCH: Mentation appears normal, affect normal/bright, judgement and insight intact, normal speech and appearance well-groomed.                Video-Visit Details    Type of service:  Video Visit     Originating Location (pt. Location): Home    Distant Location (provider location):  On-site  Platform used for Video Visit: Kimmie    "

## 2023-04-11 LAB — SARS-COV-2 RNA RESP QL NAA+PROBE: NEGATIVE

## 2023-04-17 ENCOUNTER — TELEPHONE (OUTPATIENT)
Dept: NUTRITION | Facility: CLINIC | Age: 43
End: 2023-04-17

## 2023-04-17 ENCOUNTER — TELEPHONE (OUTPATIENT)
Dept: SURGERY | Facility: CLINIC | Age: 43
End: 2023-04-17
Payer: COMMERCIAL

## 2023-04-17 DIAGNOSIS — E11.9 TYPE 2 DIABETES MELLITUS WITHOUT COMPLICATION, WITHOUT LONG-TERM CURRENT USE OF INSULIN (H): Primary | ICD-10-CM

## 2023-04-17 NOTE — TELEPHONE ENCOUNTER
Screening Questions  BLUE  KIND OF PREP RED  LOCATION [review exclusion criteria] GREEN  SEDATION TYPE        y Are you active on [employer]?       Daniel Ordering/Referring Provider?        [facility] What type of coverage do you have?      m Have you had a positive covid test in the last 14 days?     30.73 1. BMI  [BMI 40+ - review exclusion criteria]    y  2. Are you able to give consent for your medical care? [IF NO,RN REVIEW]          n  3. Are you taking any prescription pain medications on a routine schedule   (ex narcotics: oxycodone, roxicodone, oxycontin,  and percocet)? [RN Review]        n  3a. EXTENDED PREP What kind of prescription?     n 4. Do you have any chemical dependencies such as alcohol, street drugs, or methadone?        **If yes 3- 5 , please schedule with MAC sedation.**          IF YES TO ANY 6 - 10 - HOSPITAL SETTING ONLY.     n 6.   Do you need assistance transferring?     n 7.   Have you had a heart or lung transplant?    n 8.   Are you currently on dialysis?   n 9.   Do you use daily home oxygen?   n 10. Do you take nitroglycerin?   10a. n If yes, how often?     11. [FEMALES]  n Are you currently pregnant?    11a. n If yes, how many weeks? [ Greater than 12 weeks, OR NEEDED]    n 12. Do you have Pulmonary Hypertension? *NEED PAC APPT AT [facility] w/ MAC*     n 13. [review exclusion criteria]  Do you have any implantable devices in your body (pacemaker, defib, LVAD)?    n 14. In the past 6 months, have you had any heart related issues including cardiomyopathy or heart attack?     14a. n If yes, did it require cardiac stenting if so when?     n 15. Have you had a stroke or Transient ischemic attack (TIA - aka  mini stroke ) within 6 months?      n 16. Do you have mod to severe Obstructive Sleep Apnea?  [Hospital only]    n 17. Do you have SEVERE AND UNCONTROLLED asthma? *NEED PAC APPT AT [facility] w/MAC*     18. Are you currently taking any blood thinners?     18a. No. Continue to 19.   18b.  "Yes/no Blood Thinner: No [CONTINUE TO #19]    n 19. Do you take the medication Phentermine?    19a. If yes, \"Hold for 7 days before procedure.  Please consult your prescribing provider if you have questions about holding this medication.\"     n  20. Do you have chronic kidney disease?      y  21. Do you have a diagnosis of diabetes?     n  22. On a regular basis do you go 3-5 days between bowel movements?     y 23. Preferred LOCAL Pharmacy for Pre Prescription    [ LIST ONLY ONE PHARMACY]          [place], [place] - [address]        - CLOSING REMINDERS -    Informed patient they will need an adult    Cannot take any type of public or medical transportation alone    Conscious Sedation- Needs  for 6 hours after the procedure       MAC/General-Needs  for 24 hours after procedure    Pre-Procedure Covid test to be completed [[facility] PCR Testing Required]    Confirmed Nurse will call to complete assessment       - SCHEDULING DETAILS -  n Hospital Setting Required? If yes, what is the exclusion?: viktoria Garcia  Surgeon    7/26/23  Date of Procedure  Lower Endoscopy [Colonoscopy]  Type of Procedure Scheduled  [facility]- If you answer yes to questions #8, #20, #21 [  pts ]Which Colonoscopy Prep was Sent?     general Sedation Type     n PAC / Pre-op Required                 "

## 2023-04-17 NOTE — TELEPHONE ENCOUNTER
FYI - Status Update    Who is Calling: patient    Update: Pt is calling to find out the status of the order to be sent for CGM. Pt did get the my chart message and states that the [phone] is not the number to [facility]. And they have not gotten the request that was faxed on 3/16/23. Pt is requesting to have the order be re faxed to [phone] Eli is assisting with patient from , and handles the faxes for the CGM    If needed to reach Eli regarding order. She can be reached at [phone]    Pt is also looking to order the Dexcom-G7    Does caller want a call/response back: Yes     Could we send this information to you in [place] or would you prefer to receive a phone call?:   Patient would prefer a phone call   Okay to leave a detailed message?: Yes at Cell number on file:    Telephone Information:   Mobile [phone]

## 2023-04-17 NOTE — TELEPHONE ENCOUNTER
Per chart review it seems PCP team has the hard copies that were faxed, will need to refax if pt is saying they never received. Routing to PCP team.

## 2023-04-17 NOTE — TELEPHONE ENCOUNTER
[facility] has no record of completing ANY CGM forms or faxing any hard copy forms for this pt on 3/16/23   See documentation from Reilly Gomez Ed. on the My Chart encounter, dated 3/31/23   Routed to Latonya to see if she can assist with this missing form please.

## 2023-04-19 ENCOUNTER — TELEPHONE (OUTPATIENT)
Dept: GASTROENTEROLOGY | Facility: CLINIC | Age: 43
End: 2023-04-19
Payer: COMMERCIAL

## 2023-04-19 DIAGNOSIS — B18.1 VIRAL HEPATITIS B CHRONIC (H): ICD-10-CM

## 2023-04-19 RX ORDER — TENOFOVIR DISOPROXIL FUMARATE 300 MG/1
300 TABLET, FILM COATED ORAL DAILY
Qty: 30 TABLET | Refills: 11 | Status: SHIPPED | OUTPATIENT
Start: 2023-04-19

## 2023-04-19 NOTE — TELEPHONE ENCOUNTER
[facility] Call Center    Phone Message    May a detailed message be left on voicemail: yes     Reason for Call: Medication Refill Request    Has the patient contacted the pharmacy for the refill? Yes   Name of medication being requested: tenofovir (VIREAD) 300 MG tablet  Provider who prescribed the medication: jong arango  Pharmacy: [facility]  Date medication is needed: asap         Action Taken: Message routed to:  [facility] gi    Travel Screening: Not Applicable

## 2023-04-19 NOTE — TELEPHONE ENCOUNTER
Called patient to confirm pharmacy for hepatitis B medication.     Voicemail left with callback number. [employer] message sent.     JADON Rock, RN, PHN  [facility]  [address] [place] [address]

## 2023-04-20 RX ORDER — ACYCLOVIR 400 MG/1
1 TABLET ORAL
Qty: 3 EACH | Refills: 11 | Status: SHIPPED | OUTPATIENT
Start: 2023-04-20 | End: 2023-04-27

## 2023-04-20 NOTE — TELEPHONE ENCOUNTER
Called Patient he would like to Dexcom G7 vs. Libre3 which was previously denied.  Please send the dexcom G7 prescription to [facility] home link, then home link will request from [facility].   This is different from the previous request was Alla 3 which was denied.         Pt is requesting to have the order be re faxed to [phone] attn Eli.    Latonya Myers MS, RD, LD, CDE

## 2023-04-27 RX ORDER — ACYCLOVIR 400 MG/1
TABLET ORAL
Qty: 3 EACH | Refills: 5 | Status: SHIPPED | OUTPATIENT
Start: 2023-04-27

## 2023-04-27 NOTE — TELEPHONE ENCOUNTER
Prescription for Dexcom G7 sensors sent to [facility] home link [phone].      Nel Pedroza RD, [facility], 3:59 PM, 4/27/2023

## 2023-05-09 ENCOUNTER — LAB (OUTPATIENT)
Dept: LAB | Facility: CLINIC | Age: 43
End: 2023-05-09
Payer: COMMERCIAL

## 2023-05-09 DIAGNOSIS — K76.0 NONALCOHOLIC HEPATOSTEATOSIS: ICD-10-CM

## 2023-05-09 DIAGNOSIS — E11.9 TYPE 2 DIABETES MELLITUS WITHOUT COMPLICATION, WITHOUT LONG-TERM CURRENT USE OF INSULIN (H): ICD-10-CM

## 2023-05-09 DIAGNOSIS — B18.1 VIRAL HEPATITIS B CHRONIC (H): ICD-10-CM

## 2023-05-09 DIAGNOSIS — K75.81 NASH (NONALCOHOLIC STEATOHEPATITIS): ICD-10-CM

## 2023-05-09 LAB
ALBUMIN SERPL BCG-MCNC: 3.9 G/DL (ref 3.5–5.2)
ALP SERPL-CCNC: 118 U/L (ref 40–129)
ALT SERPL W P-5'-P-CCNC: 182 U/L (ref 10–50)
AST SERPL W P-5'-P-CCNC: 129 U/L (ref 10–50)
BILIRUB DIRECT SERPL-MCNC: 0.26 MG/DL (ref 0–0.3)
BILIRUB SERPL-MCNC: 1.8 MG/DL
CHOLEST SERPL-MCNC: 186 MG/DL
HBA1C MFR BLD: 8.9 % (ref 0–5.6)
HDLC SERPL-MCNC: 41 MG/DL
LDLC SERPL CALC-MCNC: 117 MG/DL
NONHDLC SERPL-MCNC: 145 MG/DL
PROT SERPL-MCNC: 8 G/DL (ref 6.4–8.3)
TRIGL SERPL-MCNC: 141 MG/DL

## 2023-05-09 PROCEDURE — 80061 LIPID PANEL: CPT

## 2023-05-09 PROCEDURE — 36415 COLL VENOUS BLD VENIPUNCTURE: CPT

## 2023-05-09 PROCEDURE — 82784 ASSAY IGA/IGD/IGG/IGM EACH: CPT

## 2023-05-09 PROCEDURE — 80076 HEPATIC FUNCTION PANEL: CPT

## 2023-05-09 PROCEDURE — 83036 HEMOGLOBIN GLYCOSYLATED A1C: CPT

## 2023-05-09 PROCEDURE — 87517 HEPATITIS B DNA QUANT: CPT

## 2023-05-10 ENCOUNTER — MYC MEDICAL ADVICE (OUTPATIENT)
Dept: FAMILY MEDICINE | Facility: CLINIC | Age: 43
End: 2023-05-10
Payer: COMMERCIAL

## 2023-05-10 DIAGNOSIS — E11.9 TYPE 2 DIABETES MELLITUS WITHOUT COMPLICATION, WITHOUT LONG-TERM CURRENT USE OF INSULIN (H): ICD-10-CM

## 2023-05-10 LAB — IGG SERPL-MCNC: 1892 MG/DL (ref 610–1616)

## 2023-05-11 LAB
HBV DNA SERPL NAA+PROBE-ACNC: <20 IU/ML
HBV DNA SERPL NAA+PROBE-LOG IU: <1.3 {LOG_IU}/ML

## 2023-06-12 ENCOUNTER — ANCILLARY PROCEDURE (OUTPATIENT)
Dept: ULTRASOUND IMAGING | Facility: CLINIC | Age: 43
End: 2023-06-12
Attending: PHYSICIAN ASSISTANT
Payer: COMMERCIAL

## 2023-06-12 DIAGNOSIS — B18.1 VIRAL HEPATITIS B CHRONIC (H): ICD-10-CM

## 2023-06-12 DIAGNOSIS — K75.81 NASH (NONALCOHOLIC STEATOHEPATITIS): ICD-10-CM

## 2023-06-12 PROCEDURE — 76981 USE PARENCHYMA: CPT | Mod: GC | Performed by: RADIOLOGY

## 2023-07-14 RX ORDER — BISACODYL 5 MG/1
TABLET, DELAYED RELEASE ORAL
Qty: 4 TABLET | Refills: 0 | Status: SHIPPED | OUTPATIENT
Start: 2023-07-14 | End: 2024-04-19

## 2023-07-25 ENCOUNTER — ANESTHESIA EVENT (OUTPATIENT)
Dept: GASTROENTEROLOGY | Facility: CLINIC | Age: 43
End: 2023-07-25
Payer: COMMERCIAL

## 2023-07-25 NOTE — ANESTHESIA PREPROCEDURE EVALUATION
Anesthesia Pre-Procedure Evaluation    Patient: Ricky Pruitt   MRN: [id number] : 1980        Procedure : Procedure(s):  Colonoscopy          Past Medical History:   Diagnosis Date    Diagnostic skin and sensitization tests (aka ALLERGENS) 5/13/15 skin tests pos. for:  cat/dog/DM/M/T/G/W    Hepatitis B     Seasonal allergic rhinitis     5/13/15 skin tests pos. for:  cat/dog/DM/M/T/G/W    Type 2 diabetes mellitus without complication, without long-term current use of insulin (H)       Past Surgical History:   Procedure Laterality Date    APPENDECTOMY      [facility] APPENDECTOMY      Description: Appendectomy;  Proc Date: 2009;      No Known Allergies   Social History     Tobacco Use    Smoking status: Never    Smokeless tobacco: Never   Substance Use Topics    Alcohol use: No      Wt Readings from Last 1 Encounters:   23 97.2 kg (214 lb 3.2 oz)        Anesthesia Evaluation            ROS/MED HX  ENT/Pulmonary:       Neurologic:     (+)    peripheral neuropathy,                            Cardiovascular:       METS/Exercise Tolerance:     Hematologic:       Musculoskeletal:       GI/Hepatic:     (+)           hepatitis  liver disease,       Renal/Genitourinary:       Endo:     (+)  type II DM,             Obesity,       Psychiatric/Substance Use:       Infectious Disease:       Malignancy:       Other:            Physical Exam    Airway  airway exam normal           Respiratory Devices and Support         Dental       (+) Modest Abnormalities - crowns, retainers, 1 or 2 missing teeth      Cardiovascular   cardiovascular exam normal          Pulmonary   pulmonary exam normal                OUTSIDE LABS:  CBC:   Lab Results   Component Value Date    WBC 9.0 2023    WBC 8.6 2021    HGB 18.1 (H) 2023    HGB 16.6 2021    HCT 53.2 (H) 2023    HCT 47.1 2021     2023     2021     BMP:   Lab Results   Component Value Date     2023    NA  136 02/02/2023    POTASSIUM 3.7 03/03/2023    POTASSIUM 4.0 02/02/2023    CHLORIDE 100 03/03/2023    CHLORIDE 97 (L) 02/02/2023    CO2 26 03/03/2023    CO2 32 (H) 02/02/2023    BUN 13.2 03/03/2023    BUN 16.9 02/02/2023    CR 0.88 03/03/2023    CR 0.76 02/02/2023     (H) 03/03/2023     (H) 02/02/2023     COAGS:   Lab Results   Component Value Date    INR 1.06 03/03/2023     POC: No results found for: BGM, HCG, HCGS  HEPATIC:   Lab Results   Component Value Date    ALBUMIN 3.9 05/09/2023    PROTTOTAL 8.0 05/09/2023     (H) 05/09/2023     (H) 05/09/2023    ALKPHOS 118 05/09/2023    BILITOTAL 1.8 (H) 05/09/2023     OTHER:   Lab Results   Component Value Date    A1C 8.9 (H) 05/09/2023    JAYRO 10.3 (H) 03/03/2023    LIPASE 94 05/30/2011       Anesthesia Plan    ASA Status:  3    NPO Status:  NPO Appropriate    Anesthesia Type: General.      Maintenance: Balanced.        Consents    Anesthesia Plan(s) and associated risks, benefits, and realistic alternatives discussed. Questions answered and patient/representative(s) expressed understanding.     - Discussed:     - Discussed with:  Patient            Postoperative Care            Comments:                MARKOS Brady CRNA

## 2023-07-26 ENCOUNTER — HOSPITAL ENCOUNTER (OUTPATIENT)
Facility: CLINIC | Age: 43
Discharge: HOME | End: 2023-07-26
Attending: SURGERY | Admitting: SURGERY
Payer: COMMERCIAL

## 2023-07-26 ENCOUNTER — ANESTHESIA (OUTPATIENT)
Dept: GASTROENTEROLOGY | Facility: CLINIC | Age: 43
End: 2023-07-26
Payer: COMMERCIAL

## 2023-07-26 VITALS
HEART RATE: 83 BPM | WEIGHT: 207 LBS | OXYGEN SATURATION: 96 % | RESPIRATION RATE: 14 BRPM | BODY MASS INDEX: 29.63 KG/M2 | DIASTOLIC BLOOD PRESSURE: 85 MMHG | TEMPERATURE: 97.8 F | HEIGHT: 70 IN | SYSTOLIC BLOOD PRESSURE: 112 MMHG

## 2023-07-26 DIAGNOSIS — Z12.11 SPECIAL SCREENING FOR MALIGNANT NEOPLASMS, COLON: Primary | ICD-10-CM

## 2023-07-26 LAB — COLONOSCOPY: NORMAL

## 2023-07-26 PROCEDURE — G0105 COLORECTAL SCRN; HI RISK IND: HCPCS | Performed by: SURGERY

## 2023-07-26 PROCEDURE — 250N000009 HC RX 250: Performed by: NURSE ANESTHETIST, CERTIFIED REGISTERED

## 2023-07-26 PROCEDURE — 258N000003 HC RX IP 258 OP 636: Performed by: NURSE ANESTHETIST, CERTIFIED REGISTERED

## 2023-07-26 PROCEDURE — 250N000011 HC RX IP 250 OP 636: Performed by: NURSE ANESTHETIST, CERTIFIED REGISTERED

## 2023-07-26 PROCEDURE — G0121 COLON CA SCRN NOT HI RSK IND: HCPCS | Performed by: SURGERY

## 2023-07-26 PROCEDURE — 370N000017 HC ANESTHESIA TECHNICAL FEE, PER MIN: Performed by: SURGERY

## 2023-07-26 PROCEDURE — 45378 DIAGNOSTIC COLONOSCOPY: CPT | Performed by: SURGERY

## 2023-07-26 PROCEDURE — 258N000003 HC RX IP 258 OP 636: Performed by: SURGERY

## 2023-07-26 RX ORDER — DEXAMETHASONE SODIUM PHOSPHATE 4 MG/ML
4 INJECTION, SOLUTION INTRA-ARTICULAR; INTRALESIONAL; INTRAMUSCULAR; INTRAVENOUS; SOFT TISSUE
Status: DISCONTINUED | OUTPATIENT
Start: 2023-07-26 | End: 2023-07-26 | Stop reason: HOSPADM

## 2023-07-26 RX ORDER — LIDOCAINE 40 MG/G
CREAM TOPICAL
Status: DISCONTINUED | OUTPATIENT
Start: 2023-07-26 | End: 2023-07-26 | Stop reason: HOSPADM

## 2023-07-26 RX ORDER — ONDANSETRON 4 MG/1
4 TABLET, ORALLY DISINTEGRATING ORAL EVERY 30 MIN PRN
Status: DISCONTINUED | OUTPATIENT
Start: 2023-07-26 | End: 2023-07-26 | Stop reason: HOSPADM

## 2023-07-26 RX ORDER — PROPOFOL 10 MG/ML
INJECTION, EMULSION INTRAVENOUS CONTINUOUS PRN
Status: DISCONTINUED | OUTPATIENT
Start: 2023-07-26 | End: 2023-07-26

## 2023-07-26 RX ORDER — NALOXONE HYDROCHLORIDE 0.4 MG/ML
0.2 INJECTION, SOLUTION INTRAMUSCULAR; INTRAVENOUS; SUBCUTANEOUS
Status: DISCONTINUED | OUTPATIENT
Start: 2023-07-26 | End: 2023-07-26 | Stop reason: HOSPADM

## 2023-07-26 RX ORDER — SODIUM CHLORIDE, SODIUM LACTATE, POTASSIUM CHLORIDE, CALCIUM CHLORIDE 600; 310; 30; 20 MG/100ML; MG/100ML; MG/100ML; MG/100ML
INJECTION, SOLUTION INTRAVENOUS CONTINUOUS
Status: DISCONTINUED | OUTPATIENT
Start: 2023-07-26 | End: 2023-07-26 | Stop reason: HOSPADM

## 2023-07-26 RX ORDER — NALOXONE HYDROCHLORIDE 0.4 MG/ML
0.4 INJECTION, SOLUTION INTRAMUSCULAR; INTRAVENOUS; SUBCUTANEOUS
Status: DISCONTINUED | OUTPATIENT
Start: 2023-07-26 | End: 2023-07-26 | Stop reason: HOSPADM

## 2023-07-26 RX ORDER — ALBUTEROL SULFATE 0.83 MG/ML
2.5 SOLUTION RESPIRATORY (INHALATION) EVERY 4 HOURS PRN
Status: DISCONTINUED | OUTPATIENT
Start: 2023-07-26 | End: 2023-07-26 | Stop reason: HOSPADM

## 2023-07-26 RX ORDER — ONDANSETRON 2 MG/ML
4 INJECTION INTRAMUSCULAR; INTRAVENOUS
Status: DISCONTINUED | OUTPATIENT
Start: 2023-07-26 | End: 2023-07-26 | Stop reason: HOSPADM

## 2023-07-26 RX ORDER — ONDANSETRON 2 MG/ML
4 INJECTION INTRAMUSCULAR; INTRAVENOUS EVERY 30 MIN PRN
Status: DISCONTINUED | OUTPATIENT
Start: 2023-07-26 | End: 2023-07-26 | Stop reason: HOSPADM

## 2023-07-26 RX ORDER — FLUMAZENIL 0.1 MG/ML
0.2 INJECTION, SOLUTION INTRAVENOUS
Status: DISCONTINUED | OUTPATIENT
Start: 2023-07-26 | End: 2023-07-26 | Stop reason: HOSPADM

## 2023-07-26 RX ADMIN — SODIUM CHLORIDE, POTASSIUM CHLORIDE, SODIUM LACTATE AND CALCIUM CHLORIDE: 600; 310; 30; 20 INJECTION, SOLUTION INTRAVENOUS at 09:12

## 2023-07-26 RX ADMIN — PROPOFOL 200 MCG/KG/MIN: 10 INJECTION, EMULSION INTRAVENOUS at 09:12

## 2023-07-26 RX ADMIN — LIDOCAINE HYDROCHLORIDE 0.1 ML: 10 INJECTION, SOLUTION EPIDURAL; INFILTRATION; INTRACAUDAL; PERINEURAL at 08:57

## 2023-07-26 RX ADMIN — SODIUM CHLORIDE, POTASSIUM CHLORIDE, SODIUM LACTATE AND CALCIUM CHLORIDE: 600; 310; 30; 20 INJECTION, SOLUTION INTRAVENOUS at 08:57

## 2023-07-26 ASSESSMENT — ACTIVITIES OF DAILY LIVING (ADL): ADLS_ACUITY_SCORE: 35

## 2023-07-26 NOTE — ANESTHESIA CARE TRANSFER NOTE
Patient: Ricky Pruitt    Procedure: Procedure(s):  Colonoscopy       Diagnosis: Colon cancer screening [Z12.11]  Diagnosis Additional Information: No value filed.    Anesthesia Type:   General     Note:    Oropharynx: oropharynx clear of all foreign objects and spontaneously breathing  Level of Consciousness: awake  Oxygen Supplementation: room air    Independent Airway: airway patency satisfactory and stable  Dentition: dentition unchanged  Vital Signs Stable: post-procedure vital signs reviewed and stable  Report to RN Given: handoff report given  Patient transferred to: Phase II    Handoff Report: Identifed the Patient, Identified the Reponsible Provider, Reviewed the pertinent medical history, Discussed the surgical course, Reviewed Intra-OP anesthesia mangement and issues during anesthesia, Set expectations for post-procedure period and Allowed opportunity for questions and acknowledgement of understanding      Vitals:  Vitals Value Taken Time   BP     Temp     Pulse     Resp     SpO2         Electronically Signed By: Abe Parker CRNA, MARKOS LOCO  July 26, 2023  9:33 AM

## 2023-07-26 NOTE — H&P
42 year old year old male here for colonoscopy for family history of colon polyps.  Patients siblings and mother have all had polyps at a young age. This is the patient's first colonoscopy.  Patient denies change in stool caliber or blood in stool.    Patient Active Problem List   Diagnosis    Viral hepatitis B chronic (H)    Hand dermatitis    Obesity    Chronic idiopathic urticaria    Diagnostic skin and sensitization tests (aka ALLERGENS)    Seasonal allergic rhinitis    Nonalcoholic hepatosteatosis    Type 2 diabetes mellitus without complication, without long-term current use of insulin (H)    Facial paralysis/Mathews palsy       Past Medical History:   Diagnosis Date    Diagnostic skin and sensitization tests (aka ALLERGENS) 5/13/15 skin tests pos. for:  cat/dog/DM/M/T/G/W    Hepatitis B     Seasonal allergic rhinitis     5/13/15 skin tests pos. for:  cat/dog/DM/M/T/G/W    Type 2 diabetes mellitus without complication, without long-term current use of insulin (H)        Past Surgical History:   Procedure Laterality Date    APPENDECTOMY  2009    [facility] APPENDECTOMY      Description: Appendectomy;  Proc Date: 07/01/2009;       Family History   Problem Relation Age of Onset    Liver Disease Mother         Hepatitis B    Eye Disorder Father     Liver Disease Sister         Hepatitis B    Eye Disorder Sister        Current Outpatient Rx   Medication Sig Dispense Refill    bisacodyl (DULCOLAX) 5 MG EC tablet Take 2 tablets at 3 pm the day before your procedure. If your procedure is before 11 am, take 2 additional tablets at 11 pm. If your procedure is after 11 am, take 2 additional tablets at 6 am. For additional instructions refer to your colonoscopy prep instructions. 4 tablet 0    polyethylene glycol (GOLYTELY) 236 g suspension The night before the exam at 6 pm drink an 8-ounce glass every 15 minutes until the jug is half empty. If you arrive before 11 AM: Drink the other half of the Hipvany[employer]ely jug at 11 PM night before  procedure. If you arrive after 11 AM: Drink the other half of the [employer] jug at 6 AM day of procedure. For additional instructions refer to your colonoscopy prep instructions. 4000 mL 0       No Known Allergies    Pt reports that he has never smoked. He has never used smokeless tobacco. He reports that he does not drink alcohol and does not use drugs.    Exam:  There were no vitals taken for this visit.    Awake, Alert OX3  Lungs - CTA bilaterally  CV - RRR, no murmurs, distal pulses intact  Abd - soft, non-distended, non-tender, +BS  Extr - No cyanosis or edema    A/P 42 year old year old male in need of colonoscopy for family history of colon cancer. Risks, benefits, alternatives, and complications were discussed including the possibility of perforation, bleeding, missed lesion, anesthetic complication, need for additional surgery/procedure, and the patient agreed to proceed.    Dexter Garcia, DO on 7/26/2023 at 9:05 AM

## 2023-07-26 NOTE — ANESTHESIA POSTPROCEDURE EVALUATION
Patient: Ricky Pruitt    Procedure: Procedure(s):  Colonoscopy       Anesthesia Type:  General    Note:  Disposition: Outpatient   Postop Pain Control: Uneventful            Sign Out: Well controlled pain   PONV: No   Neuro/Psych: Uneventful            Sign Out: Acceptable/Baseline neuro status   Airway/Respiratory: Uneventful            Sign Out: Acceptable/Baseline resp. status   CV/Hemodynamics: Uneventful            Sign Out: Acceptable CV status; No obvious hypovolemia; No obvious fluid overload   Other NRE: NONE   DID A NON-ROUTINE EVENT OCCUR? No           Last vitals:  Vitals:    07/26/23 0840   BP: 112/74   Pulse: 79   Temp: 36.8  C (98.2  F)   SpO2: 97%       Electronically Signed By: Abe Parker CRNA, MARKOS LOCO  July 26, 2023  9:34 AM

## 2023-09-10 ENCOUNTER — HEALTH MAINTENANCE LETTER (OUTPATIENT)
Age: 43
End: 2023-09-10

## 2023-11-06 ENCOUNTER — HOSPITAL ENCOUNTER (EMERGENCY)
Facility: CLINIC | Age: 43
Discharge: HOME | End: 2023-11-06
Payer: COMMERCIAL

## 2023-11-06 VITALS
TEMPERATURE: 97.7 F | SYSTOLIC BLOOD PRESSURE: 126 MMHG | OXYGEN SATURATION: 96 % | RESPIRATION RATE: 16 BRPM | HEART RATE: 83 BPM | DIASTOLIC BLOOD PRESSURE: 81 MMHG

## 2023-11-06 DIAGNOSIS — J02.9 PHARYNGITIS: ICD-10-CM

## 2023-11-06 LAB
FLUAV RNA SPEC QL NAA+PROBE: NEGATIVE
FLUBV RNA RESP QL NAA+PROBE: NEGATIVE
GROUP A STREP BY PCR: NOT DETECTED
RSV RNA SPEC NAA+PROBE: NEGATIVE
SARS-COV-2 RNA RESP QL NAA+PROBE: NEGATIVE

## 2023-11-06 PROCEDURE — 87637 SARSCOV2&INF A&B&RSV AMP PRB: CPT

## 2023-11-06 PROCEDURE — G0463 HOSPITAL OUTPT CLINIC VISIT: HCPCS

## 2023-11-06 PROCEDURE — 87651 STREP A DNA AMP PROBE: CPT

## 2023-11-06 PROCEDURE — 99213 OFFICE O/P EST LOW 20 MIN: CPT

## 2023-11-06 PROCEDURE — 250N000009 HC RX 250

## 2023-11-06 RX ORDER — DEXAMETHASONE SODIUM PHOSPHATE 4 MG/ML
10 VIAL (ML) INJECTION ONCE
Status: COMPLETED | OUTPATIENT
Start: 2023-11-06 | End: 2023-11-06

## 2023-11-06 RX ADMIN — DEXAMETHASONE SODIUM PHOSPHATE 10 MG: 4 INJECTION, SOLUTION INTRAMUSCULAR; INTRAVENOUS at 13:53

## 2023-11-06 NOTE — DISCHARGE INSTRUCTIONS
Negative strep testing today. Likely a viral cause, especially with the spots in the throat. These are called herpangina. Over the counter treatments including tylenol and ibuprofen for discomfort and fevers. Push fluids. Return for worsening.     Be aware that the decadron given today will increase blood sugars.

## 2023-11-06 NOTE — ED PROVIDER NOTES
Chief Complaint:   Chief Complaint   Patient presents with    Pharyngitis         HPI:     Ricky Pruitt is a 43 year old male who presents to the  with a 3 day history of sore throat.  He denies any other significant associated symptoms.  He has not had Rhinorrhea, Recent exposure to Strep, Fever, Rash, Nausea, Vomitting, Diarrhea, Malaise, Anorexia.  He has tried ibuprofen without relief of symptoms.  He has not been exposed to Strep.     Medications:   Current Outpatient Medications   Medication Sig Dispense Refill    alcohol swab prep pads Use to swab area of injection/jun as directed. 100 each 3    bisacodyl (DULCOLAX) 5 MG EC tablet Take 2 tablets at 3 pm the day before your procedure. If your procedure is before 11 am, take 2 additional tablets at 11 pm. If your procedure is after 11 am, take 2 additional tablets at 6 am. For additional instructions refer to your colonoscopy prep instructions. 4 tablet 0    blood glucose (NO BRAND SPECIFIED) test strip Use to test blood sugar 1 times daily or as directed. To accompany: Blood Glucose Monitor Brands: Accu-check 100 strip 6    Continuous Blood Gluc Sensor (DEXCOM G7 SENSOR) MISC Change every 10 days. 3 each 5    Continuous Blood Gluc Sensor (FREESTYLE RADHA 3 SENSOR) MISC 1 each every 14 days Use 1 sensor every 14 days. Use to read blood sugars per 's instructions. 2 each 5    polyethylene glycol (GOLYTELY) 236 g suspension The night before the exam at 6 pm drink an 8-ounce glass every 15 minutes until the jug is half empty. If you arrive before 11 AM: Drink the other half of the Golytely jug at 11 PM night before procedure. If you arrive after 11 AM: Drink the other half of the Golytely jug at 6 AM day of procedure. For additional instructions refer to your colonoscopy prep instructions. 4000 mL 0    semaglutide (OZEMPIC, 0.25 OR 0.5 MG/DOSE,) 2 MG/3ML SOPN pen Inject 0.75 mg Subcutaneous every 7 days 12 mL 0    Semaglutide, 1 MG/DOSE, (OZEMPIC)  4 MG/3ML pen Inject 1 mg Subcutaneous every 7 days 9 mL 1    tenofovir (VIREAD) 300 MG tablet Take 1 tablet (300 mg) by mouth daily 30 tablet 11    thin (NO BRAND SPECIFIED) lancets Use with lanceting device. To accompany: Blood Glucose Monitor Brands: Accucheck (fast click) 100 each 3       Allergies:   No Known Allergies    Medications updated and reviewed.  Past, family and surgical history is updated and reviewed in the record.     Review of Systems:  General: see HPI  HENT: see HPI  Skin: see HPI    Physical Exam:   /81   Pulse 83   Temp 97.7  F (36.5  C) (Tympanic)   Resp 16   SpO2 96%    General: Patient is well nourished, well developed, well groomed and in no acute distress  Ears: negative  Nose: no drainage.  Mouth/Throat: bilateral adenopathy, erythematous, and vesicular lesions.  Trismus is not present. Muffled voice is not present. Uvular shift is not present.   Neck: Neck supple. No adenopathy.   Chest/Pulmonary: normal and clear to auscultation  Cardiac: S1S2, RRR, No murmur      Medical Decision Making:  Sore throat with no exam findings to suggest peritonsillar abscess.  Strep testing by PCR negative.    Assessment:  Viral pharyngitis    Plan:   We will treat symptoms of pharyngitis and antibiotics are not indicated. Single dose of decadron given in department today for symptomatic relief.     He was advised to gargle with warm salt water 4 times a day and try to drink as much fluid as possible. Use acetaminophen, ibuprofen for discomfort. Return to the ER with increased pain, inability to swallow fluids, fever, rash or any concerns.     Condition on disposition: Stable      Disclaimer:  This note consists of symbols derived from keyboarding, dictation and/or voice recognition software.  As a result, there may be errors in the script that have gone undetected.  Please consider this when interpreting information found in this chart.           Celso Carreno, MARKOS CNP  11/06/23 [id number]

## 2024-01-04 ENCOUNTER — MYC MEDICAL ADVICE (OUTPATIENT)
Dept: FAMILY MEDICINE | Facility: CLINIC | Age: 44
End: 2024-01-04
Payer: COMMERCIAL

## 2024-01-04 NOTE — TELEPHONE ENCOUNTER
Patient Quality Outreach    Patient is due for the following:   Diabetes -  A1C, Eye Exam, Microalbumin, and Diabetic Follow-Up Visit    Next Steps:   Schedule a office visit for diabetes    Type of outreach:    Sent [facility] message.      Questions for provider review:    None           Helena Hankins, CMA

## 2024-01-17 ENCOUNTER — PATIENT OUTREACH (OUTPATIENT)
Dept: CARE COORDINATION | Facility: CLINIC | Age: 44
End: 2024-01-17
Payer: COMMERCIAL

## 2024-01-28 ENCOUNTER — HEALTH MAINTENANCE LETTER (OUTPATIENT)
Age: 44
End: 2024-01-28

## 2024-01-31 ENCOUNTER — PATIENT OUTREACH (OUTPATIENT)
Dept: CARE COORDINATION | Facility: CLINIC | Age: 44
End: 2024-01-31
Payer: COMMERCIAL

## 2024-02-09 ENCOUNTER — E-VISIT (OUTPATIENT)
Dept: FAMILY MEDICINE | Facility: CLINIC | Age: 44
End: 2024-02-09
Payer: COMMERCIAL

## 2024-02-09 DIAGNOSIS — J21.9 ACUTE BRONCHIOLITIS, UNSPECIFIED: Primary | ICD-10-CM

## 2024-02-09 PROCEDURE — 99207 PR NO BILLABLE SERVICE THIS VISIT: CPT | Performed by: FAMILY MEDICINE

## 2024-02-11 RX ORDER — BENZONATATE 100 MG/1
100 CAPSULE ORAL 3 TIMES DAILY PRN
Qty: 30 CAPSULE | Refills: 0 | Status: SHIPPED | OUTPATIENT
Start: 2024-02-11 | End: 2024-05-29

## 2024-02-11 RX ORDER — GUAIFENESIN 1200 MG/1
1200 TABLET, EXTENDED RELEASE ORAL 2 TIMES DAILY
Qty: 60 TABLET | Refills: 0 | Status: SHIPPED | OUTPATIENT
Start: 2024-02-11 | End: 2024-05-29

## 2024-02-15 ENCOUNTER — MYC MEDICAL ADVICE (OUTPATIENT)
Dept: FAMILY MEDICINE | Facility: CLINIC | Age: 44
End: 2024-02-15
Payer: COMMERCIAL

## 2024-02-15 NOTE — TELEPHONE ENCOUNTER
Patient Quality Outreach    Patient is due for the following:   Diabetes -  Diabetic Follow-Up Visit  Physical Preventive Adult Physical    Next Steps:   Schedule a Adult Preventative    Type of outreach:    Sent [employer] message.    Next Steps:  Reach out within 90 days via [employer].    Max number of attempts reached: No. Will try again in 90 days if patient still on fail list.    Questions for provider review:    None           Helena Hankins, CMA

## 2024-04-04 DIAGNOSIS — E11.9 TYPE 2 DIABETES MELLITUS WITHOUT COMPLICATION, WITHOUT LONG-TERM CURRENT USE OF INSULIN (H): ICD-10-CM

## 2024-04-07 ENCOUNTER — HEALTH MAINTENANCE LETTER (OUTPATIENT)
Age: 44
End: 2024-04-07

## 2024-04-11 ENCOUNTER — TELEPHONE (OUTPATIENT)
Dept: FAMILY MEDICINE | Facility: CLINIC | Age: 44
End: 2024-04-11
Payer: COMMERCIAL

## 2024-04-11 RX ORDER — SEMAGLUTIDE 1.34 MG/ML
1 INJECTION, SOLUTION SUBCUTANEOUS
Qty: 3 ML | Refills: 0 | Status: SHIPPED | OUTPATIENT
Start: 2024-04-11 | End: 2024-04-19

## 2024-04-11 NOTE — TELEPHONE ENCOUNTER
Prior Authorization Retail Medication Request    Medication/Dose: Pa request for ozempic 1mg  Diagnosis and ICD code (if different than what is on RX):  michael  New/renewal/insurance change PA/secondary ins. PA:  Previously Tried and Failed:  michael  Rationale:  na    Insurance   Primary: [facility]  Insurance ID:  [id number]    Secondary (if applicable):michael  Insurance ID:  michael    Pharmacy Information (if different than what is on RX)  Name:  [facility] [place]  Phone:  [phone]  Fax:[phone]

## 2024-04-19 ENCOUNTER — OFFICE VISIT (OUTPATIENT)
Dept: FAMILY MEDICINE | Facility: CLINIC | Age: 44
End: 2024-04-19
Payer: COMMERCIAL

## 2024-04-19 VITALS
RESPIRATION RATE: 16 BRPM | TEMPERATURE: 98 F | SYSTOLIC BLOOD PRESSURE: 118 MMHG | DIASTOLIC BLOOD PRESSURE: 80 MMHG | HEART RATE: 104 BPM | BODY MASS INDEX: 31.99 KG/M2 | WEIGHT: 216 LBS | OXYGEN SATURATION: 97 % | HEIGHT: 69 IN

## 2024-04-19 DIAGNOSIS — E11.9 TYPE 2 DIABETES MELLITUS WITHOUT COMPLICATION, WITHOUT LONG-TERM CURRENT USE OF INSULIN (H): ICD-10-CM

## 2024-04-19 DIAGNOSIS — R07.0 THROAT PAIN: Primary | ICD-10-CM

## 2024-04-19 LAB
DEPRECATED S PYO AG THROAT QL EIA: NEGATIVE
GROUP A STREP BY PCR: NOT DETECTED

## 2024-04-19 PROCEDURE — 99213 OFFICE O/P EST LOW 20 MIN: CPT | Performed by: PHYSICIAN ASSISTANT

## 2024-04-19 PROCEDURE — 87651 STREP A DNA AMP PROBE: CPT | Performed by: PHYSICIAN ASSISTANT

## 2024-04-19 RX ORDER — DEXAMETHASONE 2 MG/1
10 TABLET ORAL ONCE
Qty: 5 TABLET | Refills: 0 | Status: SHIPPED | OUTPATIENT
Start: 2024-04-19 | End: 2024-05-29

## 2024-04-19 RX ORDER — SEMAGLUTIDE 1.34 MG/ML
1 INJECTION, SOLUTION SUBCUTANEOUS
Qty: 3 ML | Refills: 0 | Status: SHIPPED | OUTPATIENT
Start: 2024-04-19 | End: 2024-06-13

## 2024-04-19 ASSESSMENT — PAIN SCALES - GENERAL: PAINLEVEL: SEVERE PAIN (6)

## 2024-04-19 NOTE — PROGRESS NOTES
"  Assessment & Plan   Problem List Items Addressed This Visit          Endocrine    Type 2 diabetes mellitus without complication, without long-term current use of insulin (H)    Relevant Medications    dexAMETHasone (DECADRON) 2 MG tablet    Semaglutide, 1 MG/DOSE, (OZEMPIC, 1 MG/DOSE,) 4 MG/3ML pen     Other Visit Diagnoses       Throat pain    -  Primary    Relevant Medications    dexAMETHasone (DECADRON) 2 MG tablet    Other Relevant Orders    Streptococcus A Rapid Screen w/Reflex to PCR - Clinic Collect (Completed)    Group A Streptococcus PCR Throat Swab           Impression is likely viral URI including RSV, given exposure. Rapid strep neg and pcr pending. Appears well and non-toxic and I have low suspicion for impending airway obstruction or respiratory distress at this point.  He will push p.o. fluids, use over-the-counter meds for symptoms, take a dose of Decadron and follow-up with us in 1-2 weeks if not improving or urgent care/the ER if symptoms worsen/change at any time. Ozempic refilled. Tolerating well.    Complete history and physical exam as below. Afebrile with normal vital signs  aside from tachycardia which resolved by the time I examined him.    DDx and Dx discussed with and explained to the pt to their satisfaction.  All questions were answered at this time. Pt expressed understanding of and agreement with this dx, tx, and plan. No further workup warranted and standard medication warnings given. I have given the patient a list of pertinent indications for re-evaluation. Will go to the Emergency Department if symptoms worsen or new concerning symptoms arise. Patient left in no apparent distress.       Ordering of each unique test  Prescription drug management     BMI  Estimated body mass index is 31.7 kg/m  as calculated from the following:    Height as of this encounter: 1.758 m (5' 9.21\").    Weight as of this encounter: 98 kg (216 lb).     See Patient Instructions      Subjective   Ricky is " Reached voicemail. LMOR, per Dr Jillian Gray, unable to prescribe anything until EMG has been completed. Pt may call to schedule a sooner EMG appt if she chooses. "a 43 year old, presenting for the following health issues:  Sick        4/19/2024     9:23 AM   Additional Questions   Roomed by Alexandra Nguyễn CMA   Accompanied by N/A         4/19/2024     9:23 AM   Patient Reported Additional Medications   Patient reports taking the following new medications No new medications     History of Present Illness       Reason for visit:  It ia extremely painful to to swallow. I belive i may have laryngitis.  Symptom onset:  1-3 days ago  Symptom intensity:  Severe  Symptom progression:  Worsening  Had these symptoms before:  No    He eats 2-3 servings of fruits and vegetables daily.He consumes 0 sweetened beverage(s) daily.He exercises with enough effort to increase his heart rate 30 to 60 minutes per day.  He exercises with enough effort to increase his heart rate 3 or less days per week. He is missing 7 dose(s) of medications per week.  He is not taking prescribed medications regularly due to other.     Patient is coming in today with a sore throat, congestion, and runny nose.  Patient reports that symptoms started last weekend and have steadily gotten worse. Traveling with people that had RSV. He was recently in [facility] where there was a lot of dry air. Wife and kids have similar symptoms. No sob, chest pain or cough.    Patient states, \"It feels like razor blades in my throat.\"    He would also like a refill on ozempic if possible.      Review of Systems  Constitutional, HEENT, cardiovascular, pulmonary, gi and gu systems are negative, except as otherwise noted.      Objective    /80   Pulse 104   Temp 98  F (36.7  C) (Temporal)   Resp 16   Ht 1.758 m (5' 9.21\")   Wt 98 kg (216 lb)   SpO2 97%   BMI 31.70 kg/m    Body mass index is 31.7 kg/m .  Physical Exam  Vitals and nursing note reviewed.   Constitutional:       General: He is not in acute distress.     Appearance: He is not ill-appearing or diaphoretic.   HENT:      Head: Normocephalic and atraumatic.      Nose: " Nose normal.      Mouth/Throat:      Mouth: Mucous membranes are moist.      Comments: No trismus, voice abnormalities or asymmetry to the oropharynx. Mild erythema and edema to the palatine tonsils.   Eyes:      Conjunctiva/sclera: Conjunctivae normal.   Cardiovascular:      Rate and Rhythm: Normal rate and regular rhythm.      Heart sounds: Normal heart sounds. No murmur heard.     No friction rub. No gallop.   Pulmonary:      Effort: Pulmonary effort is normal. No respiratory distress.      Breath sounds: Normal breath sounds. No stridor. No wheezing, rhonchi or rales.   Musculoskeletal:      Cervical back: Neck supple. No rigidity.   Lymphadenopathy:      Cervical: No cervical adenopathy.   Skin:     General: Skin is warm and dry.   Neurological:      General: No focal deficit present.      Mental Status: He is alert. Mental status is at baseline.   Psychiatric:         Mood and Affect: Mood normal.         Behavior: Behavior normal.          Results for orders placed or performed in visit on 04/19/24   Streptococcus A Rapid Screen w/Reflex to PCR - Clinic Collect     Status: Normal    Specimen: Throat; Swab   Result Value Ref Range    Group A Strep antigen Negative Negative           Signed Electronically by: AUTUMN Carias

## 2024-04-19 NOTE — TELEPHONE ENCOUNTER
Retail Pharmacy Prior Authorization Team   Phone: [phone]    EPA APPROVED, RX RELEASED TO PHARMACY ONCE APPROVED -

## 2024-04-22 ENCOUNTER — TELEPHONE (OUTPATIENT)
Dept: FAMILY MEDICINE | Facility: CLINIC | Age: 44
End: 2024-04-22
Payer: COMMERCIAL

## 2024-04-22 NOTE — TELEPHONE ENCOUNTER
Patient Quality Outreach    Patient is due for the following:   Diabetes -  Diabetic Follow-Up Visit    Next Steps:   Schedule a office visit for diabetes    Type of outreach:    Sent letter.      Questions for provider review:    None           Helena Hankins, CMA

## 2024-04-22 NOTE — LETTER
April 22, 2024      Ricky Pruitt  [address]  [place] [address]        Dear Ricky,       Your team at [facility] cares about your health. We have reviewed your chart and based on our findings; we are making the following recommendations to better manage your health.     You are in particular need of attention regarding the following:     Schedule a DIABETIC FOLLOW UP appointment for Office Visit. Patients with diabetes should see their provider regularly.    If you have already completed these items, please contact the clinic via phone or   [employer] so your care team can review and update your records. Thank you for   choosing [facility] for your healthcare needs. For any questions,   concerns, or to schedule an appointment please contact our clinic.        Sincerely,        JANICE ROMAN, DO

## 2024-05-21 ENCOUNTER — HOSPITAL ENCOUNTER (EMERGENCY)
Facility: CLINIC | Age: 44
Discharge: HOME | End: 2024-05-21
Payer: COMMERCIAL

## 2024-05-21 ENCOUNTER — APPOINTMENT (OUTPATIENT)
Dept: GENERAL RADIOLOGY | Facility: CLINIC | Age: 44
End: 2024-05-21
Payer: COMMERCIAL

## 2024-05-21 VITALS
TEMPERATURE: 97.9 F | SYSTOLIC BLOOD PRESSURE: 120 MMHG | OXYGEN SATURATION: 96 % | HEART RATE: 90 BPM | DIASTOLIC BLOOD PRESSURE: 78 MMHG | RESPIRATION RATE: 16 BRPM

## 2024-05-21 DIAGNOSIS — J20.9 ACUTE BRONCHITIS, UNSPECIFIED ORGANISM: ICD-10-CM

## 2024-05-21 PROCEDURE — 71046 X-RAY EXAM CHEST 2 VIEWS: CPT

## 2024-05-21 PROCEDURE — 87637 SARSCOV2&INF A&B&RSV AMP PRB: CPT | Performed by: NURSE PRACTITIONER

## 2024-05-21 PROCEDURE — G0463 HOSPITAL OUTPT CLINIC VISIT: HCPCS | Mod: 25

## 2024-05-21 PROCEDURE — 87651 STREP A DNA AMP PROBE: CPT | Performed by: NURSE PRACTITIONER

## 2024-05-21 PROCEDURE — 99214 OFFICE O/P EST MOD 30 MIN: CPT

## 2024-05-21 ASSESSMENT — COLUMBIA-SUICIDE SEVERITY RATING SCALE - C-SSRS
6. HAVE YOU EVER DONE ANYTHING, STARTED TO DO ANYTHING, OR PREPARED TO DO ANYTHING TO END YOUR LIFE?: NO
2. HAVE YOU ACTUALLY HAD ANY THOUGHTS OF KILLING YOURSELF IN THE PAST MONTH?: NO
1. IN THE PAST MONTH, HAVE YOU WISHED YOU WERE DEAD OR WISHED YOU COULD GO TO SLEEP AND NOT WAKE UP?: NO

## 2024-05-21 ASSESSMENT — ACTIVITIES OF DAILY LIVING (ADL)
ADLS_ACUITY_SCORE: 35
ADLS_ACUITY_SCORE: 35

## 2024-05-21 NOTE — ED PROVIDER NOTES
History     Chief Complaint   Patient presents with    Cough     HPI  Ricky Pruitt is a 43 year old male who presents for evaluation of cough, congestion, sore throat, and fever that initially began about 4 days ago.  Reports that his son was sick with similar symptoms last week.  He has been coughing up greenish sputum.  Reports he noticed last night and today that he is noticing blood when he coughs.  Denies any chest pain or tightness, palpitations, unilateral leg swelling.  He has tried several OTC medications to help with his symptoms-Mucinex, Tylenol and ibuprofen, hot teas, etc. however reports symptoms seem to be worsening over the last couple of days with development of fever today at 101  F.  He took a dose of ibuprofen this morning.  Denies any difficulty with breathing or shortness of breath, wheezing, abdominal pain, nausea, or vomiting.  He does not smoke.  No personal history of COPD or asthma.    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 for:  cat/dog/DM/M/T/G/W      Chronic idiopathic urticaria 03/23/2015     Priority: Medium    Obesity 03/06/2015     Priority: Medium    Viral hepatitis B chronic (H) 03/20/2014     Priority: Medium    Hand dermatitis 03/20/2014     Priority: Medium        Past Medical History:    Past Medical History:   Diagnosis Date    Diagnostic skin and sensitization tests (aka ALLERGENS) 5/13/15 skin tests pos. for:  cat/dog/DM/M/T/G/W    Hepatitis B     Seasonal allergic rhinitis     Type 2 diabetes  mellitus without complication, without long-term current use of insulin (H)        Past Surgical History:    Past Surgical History:   Procedure Laterality Date    APPENDECTOMY  2009    COLONOSCOPY N/A 7/26/2023    Procedure: Colonoscopy;  Surgeon: Dexter Garcia DO;  Location: [facility] APPENDECTOMY      Description: Appendectomy;  Proc Date: 07/01/2009;       Family History:    Family History   Problem Relation Age of Onset    Liver Disease Mother         Hepatitis B    Eye Disorder Father     Liver Disease Sister         Hepatitis B    Eye Disorder Sister        Social History:  Marital Status:   [2]  Social History     Tobacco Use    Smoking status: Never     Passive exposure: Never    Smokeless tobacco: Never   Vaping Use    Vaping status: Never Used   Substance Use Topics    Alcohol use: No    Drug use: No        Medications:    amoxicillin-clavulanate (AUGMENTIN) 875-125 MG tablet  alcohol swab prep pads  benzonatate (TESSALON) 100 MG capsule  blood glucose (NO BRAND SPECIFIED) test strip  Continuous Blood Gluc Sensor (DEXCOM G7 SENSOR) MISC  Continuous Blood Gluc Sensor (FREESTYLE RADHA 3 SENSOR) MISC  dexAMETHasone (DECADRON) 2 MG tablet  guaiFENesin 1200 MG TB12  polyethylene glycol (GOLYTELY) 236 g suspension  semaglutide (OZEMPIC, 0.25 OR 0.5 MG/DOSE,) 2 MG/3ML SOPN pen  Semaglutide, 1 MG/DOSE, (OZEMPIC, 1 MG/DOSE,) 4 MG/3ML pen  tenofovir (VIREAD) 300 MG tablet  thin (NO BRAND SPECIFIED) lancets          Review of Systems  Pertinent review of systems as documented per HPI above.    Physical Exam   BP: 120/78  Pulse: 90  Temp: 97.9  F (36.6  C)  Resp: 16  SpO2: 96 %      Physical Exam  Vitals and nursing note reviewed.   Constitutional:       General: He is not in acute distress.     Appearance: Normal appearance. He is not ill-appearing, toxic-appearing or diaphoretic.   HENT:      Head: Normocephalic and atraumatic.      Right Ear: Tympanic membrane, ear canal and external ear  normal. Tympanic membrane is not erythematous.      Left Ear: Tympanic membrane, ear canal and external ear normal. Tympanic membrane is not erythematous.      Nose: Congestion present.      Mouth/Throat:      Mouth: Mucous membranes are moist.      Pharynx: Oropharynx is clear. Uvula midline. Posterior oropharyngeal erythema present.      Tonsils: No tonsillar exudate or tonsillar abscesses. 1+ on the right. 1+ on the left.   Eyes:      Extraocular Movements: Extraocular movements intact.      Conjunctiva/sclera: Conjunctivae normal.   Cardiovascular:      Rate and Rhythm: Normal rate.   Pulmonary:      Effort: Pulmonary effort is normal. No respiratory distress.      Breath sounds: Rhonchi present. No wheezing or rales.   Abdominal:      General: Abdomen is flat.      Palpations: Abdomen is soft.   Skin:     General: Skin is warm and dry.   Neurological:      General: No focal deficit present.      Mental Status: He is alert and oriented to person, place, and time.   Psychiatric:         Mood and Affect: Mood normal.         Behavior: Behavior normal.         ED Course     Results for orders placed or performed during the hospital encounter of 05/21/24 (from the past 24 hour(s))   Group A Streptococcus PCR Throat Swab    Specimen: Throat; Swab   Result Value Ref Range    Group A strep by PCR Not Detected Not Detected    Narrative    The Xpert Xpress Strep A test, performed on the [employer], is a rapid, qualitative in vitro diagnostic test for the detection of Streptococcus pyogenes (Group A ß-hemolytic Streptococcus, Strep A) in throat swab specimens from patients with signs and symptoms of pharyngitis. The Xpert Xpress Strep A test can be used as an aid in the diagnosis of Group A Streptococcal pharyngitis. The assay is not intended to monitor treatment for Group A Streptococcus infections. The Xpert Xpress Strep A test utilizes an automated real-time polymerase chain reaction (PCR) to detect  Streptococcus pyogenes DNA.   Symptomatic Influenza A/B, RSV, & SARS-CoV2 PCR (COVID-19) Nasopharyngeal    Specimen: Nasopharyngeal; Swab   Result Value Ref Range    Influenza A PCR Negative Negative    Influenza B PCR Negative Negative    RSV PCR Negative Negative    SARS CoV2 PCR Negative Negative    Narrative    Testing was performed using the Xpert Xpress CoV2/Flu/RSV Assay on the [employer] Gene[employer]pert Instrument. This test should be ordered for the detection of SARS-CoV-2, influenza, and RSV viruses in individuals who meet clinical and/or epidemiological criteria. Test performance is unknown in asymptomatic patients. This test is for in vitro diagnostic use under the FDA EUA for laboratories certified under CLIA to perform high or moderate complexity testing. This test has not been FDA cleared or approved. A negative result does not rule out the presence of PCR inhibitors in the specimen or target RNA in concentration below the limit of detection for the assay. If only one viral target is positive but coinfection with multiple targets is suspected, the sample should be re-tested with another FDA cleared, approved, or authorized test, if coinfection would change clinical management. This test was validated by the [facility]. These laboratories are certified under the Clinical Laboratory Improvement Amendments of 1988 (CLIA-88) as qualified to perform high complexity laboratory testing.   XR Chest 2 Views    Narrative    CHEST TWO VIEWS  5/21/2024 12:43 PM     HISTORY: Cough, congestion for 4 days.    COMPARISON: Chest radiograph 12/4/2017       Impression    IMPRESSION: No focal consolidation, pleural effusion or pneumothorax.  Cardiomediastinal silhouette is unremarkable.    Right perihilar 5 mm nodular opacity may reflect overlapping  structures; however, a pulmonary nodule is not excluded, new since  prior. Consider follow-up radiograph or CT chest for further  characterization.    RU NORRIS  MD SERGIO         SYSTEM ID:  [id number]       Medications - No data to display    Assessments & Plan (with Medical Decision Making)     Pleasant 43-year-old male who presents for evaluation of cough, congestion, sore throat, and fever that began about 4 days ago.  See HPI above.    On exam, he is well-appearing, afebrile with vital signs within normal limits.  There is congestion present along with posterior oropharyngeal erythema and 1+ tonsils bilaterally.  No tonsillar exudate or abscess.  On auscultation, scattered rhonchi are heard without wheezing or rails.  Rest of exam as above.  Testing negative for strep, COVID-19, influenza, and RSV.  Independent review of chest x-ray did not show any signs of focal consolidation, pneumothorax, or pleural effusion.  Radiologist noted a right prehilar 5 mm nodular opacity not seen on prior chest imaging.  Suspect symptoms related to acute bronchitis of unspecified organism, suspect that radiology findings noncontributory to current symptoms however discussed with patient that he should follow-up with PCP next week for repeat imaging to further characterize the nodule.  Given clinical presentation and symptoms, will treat with Augmentin, discussed usage and potential adverse effects of medication.  Discussed supportive cares to aid with symptoms.  Advised that if symptoms do not begin to improve over the next couple of days or if he develops any worsening symptoms in the interim that he return here for further evaluation.  All questions answered.  Patient verbalizes understanding and agreement with the above plan.    I have reviewed the nursing notes.    I have reviewed the findings, diagnosis, plan and need for follow up with the patient.      Discharge Medication List as of 5/21/2024  1:24 PM        START taking these medications    Details   amoxicillin-clavulanate (AUGMENTIN) 875-125 MG tablet Take 1 tablet by mouth 2 times daily for 7 days, Disp-14 tablet, R-0,  E-Prescribe             Final diagnoses:   Acute bronchitis, unspecified organism       5/21/2024   [facility] EMERGENCY DEPT       Opal Dozier PA-C  05/21/24 1920

## 2024-05-21 NOTE — DISCHARGE INSTRUCTIONS
You were seen today for ongoing cough, congestion, and fever.  Your testing was negative for strep, COVID-19, RSV, and flu.  Your chest x-ray did not show any signs of an acute pneumonia, fluid in your lungs, however did show a nonspecific nodule which I do not think is contributing to your symptoms today.  However I do recommend that you follow-up with your primary care provider next week so that they can recheck your symptoms and repeat imaging to further characterize the nodule.  I sent Augmentin, an antibiotic for treatment, you will take this twice a day for the next week.  If your symptoms do not begin to improve within the next couple of days or if you develop any worsening symptoms, then please return here for further evaluation.

## 2024-05-29 ENCOUNTER — OFFICE VISIT (OUTPATIENT)
Dept: FAMILY MEDICINE | Facility: CLINIC | Age: 44
End: 2024-05-29
Payer: COMMERCIAL

## 2024-05-29 VITALS
HEART RATE: 87 BPM | TEMPERATURE: 97.4 F | BODY MASS INDEX: 31.79 KG/M2 | DIASTOLIC BLOOD PRESSURE: 78 MMHG | WEIGHT: 216.6 LBS | SYSTOLIC BLOOD PRESSURE: 118 MMHG | RESPIRATION RATE: 16 BRPM | OXYGEN SATURATION: 96 %

## 2024-05-29 DIAGNOSIS — R93.89 ABNORMAL CXR: Primary | ICD-10-CM

## 2024-05-29 PROCEDURE — G2211 COMPLEX E/M VISIT ADD ON: HCPCS | Performed by: NURSE PRACTITIONER

## 2024-05-29 PROCEDURE — 99213 OFFICE O/P EST LOW 20 MIN: CPT | Performed by: NURSE PRACTITIONER

## 2024-05-29 ASSESSMENT — PAIN SCALES - GENERAL: PAINLEVEL: NO PAIN (0)

## 2024-05-29 NOTE — PROGRESS NOTES
"  Assessment & Plan     Abnormal CXR  Seen in ED for acute illness, diagnosis of bronchitis which was treated with Augmentin. Symptoms significantly improved and normal exam today. CXR done in UC with concern of a 5mm right perihilar nodular opacity with recommended repeat imaging. No history of tobacco use. Recommend CT follow up, this is ordered. Further recommendations will be made pending results.  - CT Chest w Contrast; Future        MED REC REQUIRED  Post Medication Reconciliation Status: patient was not discharged from an inpatient facility or TCU  BMI  Estimated body mass index is 31.79 kg/m  as calculated from the following:    Height as of 4/19/24: 1.758 m (5' 9.21\").    Weight as of this encounter: 98.2 kg (216 lb 9.6 oz).         See Patient Instructions    Tana García is a 43 year old, presenting for the following health issues:  ER F/U        5/29/2024     6:59 AM   Additional Questions   Roomed by Thania QUEZADA CMA   Accompanied by Self         5/29/2024     6:59 AM   Patient Reported Additional Medications   Patient reports taking the following new medications None     HPI       ED/UC Followup:    Facility:  [facility] Emergency Department  Date of visit: 05/21/2024  Reason for visit: Cough  Current Status: Patient has finished his antibiotics and wanting to do a follow up on the chest x-ray.          Review of Systems  Constitutional, neuro, ENT, endocrine, pulmonary, cardiac, gastrointestinal, genitourinary, musculoskeletal, integument and psychiatric systems are negative, except as otherwise noted.      Objective    /78 (BP Location: Left arm, Patient Position: Sitting, Cuff Size: Adult Regular)   Pulse 87   Temp 97.4  F (36.3  C) (Tympanic)   Resp 16   Wt 98.2 kg (216 lb 9.6 oz)   SpO2 96%   BMI 31.79 kg/m    Body mass index is 31.79 kg/m .  Physical Exam  Vitals and nursing note reviewed.   Constitutional:       Appearance: Normal appearance.   HENT:      Head: " Normocephalic and atraumatic.      Mouth/Throat:      Mouth: Mucous membranes are moist.   Eyes:      Comments: Non-icteric   Cardiovascular:      Rate and Rhythm: Normal rate and regular rhythm.      Pulses: Normal pulses.      Heart sounds: Normal heart sounds, S1 normal and S2 normal. Heart sounds not distant. No murmur heard.     No friction rub. No gallop.   Pulmonary:      Effort: Pulmonary effort is normal.      Breath sounds: Normal breath sounds.   Abdominal:      General: Abdomen is flat. Bowel sounds are normal.      Palpations: Abdomen is soft.   Musculoskeletal:      Cervical back: Neck supple.      Right lower leg: No edema.      Left lower leg: No edema.   Skin:     General: Skin is warm and dry.      Capillary Refill: Capillary refill takes less than 2 seconds.   Neurological:      General: No focal deficit present.      Mental Status: He is alert and oriented to person, place, and time.   Psychiatric:         Mood and Affect: Mood normal.         Behavior: Behavior normal.         Thought Content: Thought content normal.         Judgment: Judgment normal.                    Signed Electronically by: MARKOS Paniagua CNP

## 2024-06-06 ENCOUNTER — MYC REFILL (OUTPATIENT)
Dept: FAMILY MEDICINE | Facility: CLINIC | Age: 44
End: 2024-06-06
Payer: COMMERCIAL

## 2024-06-06 DIAGNOSIS — E11.9 TYPE 2 DIABETES MELLITUS WITHOUT COMPLICATION, WITHOUT LONG-TERM CURRENT USE OF INSULIN (H): ICD-10-CM

## 2024-06-06 RX ORDER — SEMAGLUTIDE 1.34 MG/ML
1 INJECTION, SOLUTION SUBCUTANEOUS
Qty: 3 ML | Refills: 0 | OUTPATIENT
Start: 2024-06-06

## 2024-06-10 ENCOUNTER — HOSPITAL ENCOUNTER (OUTPATIENT)
Dept: CT IMAGING | Facility: CLINIC | Age: 44
Discharge: HOME | End: 2024-06-10
Attending: NURSE PRACTITIONER | Admitting: NURSE PRACTITIONER
Payer: COMMERCIAL

## 2024-06-10 DIAGNOSIS — R93.89 ABNORMAL CXR: ICD-10-CM

## 2024-06-10 DIAGNOSIS — R91.8 PULMONARY NODULES: Primary | ICD-10-CM

## 2024-06-10 LAB
CREAT BLD-MCNC: 0.9 MG/DL (ref 0.7–1.3)
EGFRCR SERPLBLD CKD-EPI 2021: >60 ML/MIN/1.73M2

## 2024-06-10 PROCEDURE — 82565 ASSAY OF CREATININE: CPT

## 2024-06-10 PROCEDURE — 250N000011 HC RX IP 250 OP 636: Performed by: NURSE PRACTITIONER

## 2024-06-10 PROCEDURE — 250N000009 HC RX 250: Performed by: NURSE PRACTITIONER

## 2024-06-10 PROCEDURE — 71260 CT THORAX DX C+: CPT

## 2024-06-10 RX ORDER — IOPAMIDOL 755 MG/ML
100 INJECTION, SOLUTION INTRAVASCULAR ONCE
Status: COMPLETED | OUTPATIENT
Start: 2024-06-10 | End: 2024-06-10

## 2024-06-10 RX ADMIN — IOPAMIDOL 100 ML: 755 INJECTION, SOLUTION INTRAVENOUS at 07:57

## 2024-06-10 RX ADMIN — SODIUM CHLORIDE 66 ML: 9 INJECTION, SOLUTION INTRAVENOUS at 07:57

## 2024-06-13 ENCOUNTER — OFFICE VISIT (OUTPATIENT)
Dept: FAMILY MEDICINE | Facility: CLINIC | Age: 44
End: 2024-06-13
Payer: COMMERCIAL

## 2024-06-13 ENCOUNTER — PATIENT OUTREACH (OUTPATIENT)
Dept: ONCOLOGY | Facility: CLINIC | Age: 44
End: 2024-06-13

## 2024-06-13 VITALS
TEMPERATURE: 97.7 F | OXYGEN SATURATION: 94 % | RESPIRATION RATE: 16 BRPM | BODY MASS INDEX: 31.55 KG/M2 | HEIGHT: 69 IN | DIASTOLIC BLOOD PRESSURE: 80 MMHG | HEART RATE: 85 BPM | WEIGHT: 213 LBS | SYSTOLIC BLOOD PRESSURE: 120 MMHG

## 2024-06-13 DIAGNOSIS — R91.8 PULMONARY NODULES: ICD-10-CM

## 2024-06-13 DIAGNOSIS — E11.9 TYPE 2 DIABETES MELLITUS WITHOUT COMPLICATION, WITHOUT LONG-TERM CURRENT USE OF INSULIN (H): Primary | ICD-10-CM

## 2024-06-13 DIAGNOSIS — E78.2 MIXED HYPERLIPIDEMIA: ICD-10-CM

## 2024-06-13 DIAGNOSIS — K76.0 FATTY LIVER: ICD-10-CM

## 2024-06-13 DIAGNOSIS — R91.8 PULMONARY NODULES: Primary | ICD-10-CM

## 2024-06-13 LAB
ALBUMIN SERPL BCG-MCNC: 4 G/DL (ref 3.5–5.2)
ALP SERPL-CCNC: 84 U/L (ref 40–150)
ALT SERPL W P-5'-P-CCNC: 67 U/L (ref 0–70)
ANION GAP SERPL CALCULATED.3IONS-SCNC: 11 MMOL/L (ref 7–15)
AST SERPL W P-5'-P-CCNC: 60 U/L (ref 0–45)
BILIRUB DIRECT SERPL-MCNC: <0.2 MG/DL (ref 0–0.3)
BILIRUB SERPL-MCNC: 1.1 MG/DL
BUN SERPL-MCNC: 13.6 MG/DL (ref 6–20)
CALCIUM SERPL-MCNC: 9.4 MG/DL (ref 8.6–10)
CHLORIDE SERPL-SCNC: 104 MMOL/L (ref 98–107)
CHOLEST SERPL-MCNC: 217 MG/DL
CREAT SERPL-MCNC: 0.81 MG/DL (ref 0.67–1.17)
CREAT UR-MCNC: 170.5 MG/DL
DEPRECATED HCO3 PLAS-SCNC: 24 MMOL/L (ref 22–29)
EGFRCR SERPLBLD CKD-EPI 2021: >90 ML/MIN/1.73M2
FASTING STATUS PATIENT QL REPORTED: YES
FASTING STATUS PATIENT QL REPORTED: YES
GLUCOSE SERPL-MCNC: 142 MG/DL (ref 70–99)
HBA1C MFR BLD: 8.2 % (ref 0–5.6)
HDLC SERPL-MCNC: 51 MG/DL
LDLC SERPL CALC-MCNC: 147 MG/DL
MICROALBUMIN UR-MCNC: <12 MG/L
MICROALBUMIN/CREAT UR: NORMAL MG/G{CREAT}
NONHDLC SERPL-MCNC: 166 MG/DL
POTASSIUM SERPL-SCNC: 3.9 MMOL/L (ref 3.4–5.3)
PROT SERPL-MCNC: 7.6 G/DL (ref 6.4–8.3)
SODIUM SERPL-SCNC: 139 MMOL/L (ref 135–145)
TRIGL SERPL-MCNC: 94 MG/DL

## 2024-06-13 PROCEDURE — 82248 BILIRUBIN DIRECT: CPT | Performed by: FAMILY MEDICINE

## 2024-06-13 PROCEDURE — 82043 UR ALBUMIN QUANTITATIVE: CPT | Performed by: FAMILY MEDICINE

## 2024-06-13 PROCEDURE — 99214 OFFICE O/P EST MOD 30 MIN: CPT | Mod: 25 | Performed by: FAMILY MEDICINE

## 2024-06-13 PROCEDURE — 82570 ASSAY OF URINE CREATININE: CPT | Performed by: FAMILY MEDICINE

## 2024-06-13 PROCEDURE — 36415 COLL VENOUS BLD VENIPUNCTURE: CPT | Performed by: FAMILY MEDICINE

## 2024-06-13 PROCEDURE — 90471 IMMUNIZATION ADMIN: CPT | Performed by: FAMILY MEDICINE

## 2024-06-13 PROCEDURE — 83036 HEMOGLOBIN GLYCOSYLATED A1C: CPT | Performed by: FAMILY MEDICINE

## 2024-06-13 PROCEDURE — 80061 LIPID PANEL: CPT | Performed by: FAMILY MEDICINE

## 2024-06-13 PROCEDURE — 80053 COMPREHEN METABOLIC PANEL: CPT | Performed by: FAMILY MEDICINE

## 2024-06-13 PROCEDURE — 90677 PCV20 VACCINE IM: CPT | Performed by: FAMILY MEDICINE

## 2024-06-13 RX ORDER — ATORVASTATIN CALCIUM 40 MG/1
40 TABLET, FILM COATED ORAL DAILY
Qty: 90 TABLET | Refills: 3 | Status: SHIPPED | OUTPATIENT
Start: 2024-06-13

## 2024-06-13 RX ORDER — SEMAGLUTIDE 1.34 MG/ML
1 INJECTION, SOLUTION SUBCUTANEOUS
Qty: 3 ML | Refills: 0 | Status: SHIPPED | OUTPATIENT
Start: 2024-06-13 | End: 2024-09-11

## 2024-06-13 ASSESSMENT — PAIN SCALES - GENERAL: PAINLEVEL: NO PAIN (0)

## 2024-06-13 NOTE — PROGRESS NOTES
Assessment & Plan     Type 2 diabetes mellitus without complication, without long-term current use of insulin (H)  Overdue for recheck.  Discussed A1c is fairly stable however my goal for him would be closer to 7.0.  He has been out of his Ozempic for about 3 weeks we will plan to restart him at his 1 mg dose and increase to 2 mg after 1 month.  Continue to wear CGM and work with diabetic education.    - Adult Eye  Referral  - Albumin Random Urine Quantitative with Creat Ratio  - BASIC METABOLIC PANEL  - Lipid panel reflex to direct LDL Non-fasting  - Hemoglobin A1c  - HEMOGLOBIN A1C  - BASIC METABOLIC PANEL  - Lipid panel reflex to direct LDL Non-fasting  - **Hemoglobin A1c FUTURE 3mo  - Comprehensive metabolic panel (BMP + Alb, Alk Phos, ALT, AST, Total. Bili, TP)  - **Hepatic panel FUTURE 2mo  - Semaglutide, 1 MG/DOSE, (OZEMPIC, 1 MG/DOSE,) 4 MG/3ML pen  Dispense: 3 mL; Refill: 0  - Semaglutide, 2 MG/DOSE, (OZEMPIC) 8 MG/3ML pen  Dispense: 9 mL; Refill: 1  - **Hepatic panel FUTURE 2mo  - Albumin Random Urine Quantitative with Creat Ratio  - Adult Diabetes Education  Referral    Pulmonary nodules  *No pulmonary nodules, currently asymptomatic.  Patient would like to meet with pulmonology  - Adult Pulmonary Medicine  Referral    Fatty liver  With comorbid type 2 diabetes.  He does follow with [facility] for this and his hepatitis B  - Comprehensive metabolic panel (BMP + Alb, Alk Phos, ALT, AST, Total. Bili, TP)  - **Hepatic panel FUTURE 2mo  - Semaglutide, 2 MG/DOSE, (OZEMPIC) 8 MG/3ML pen  Dispense: 9 mL; Refill: 1  - **Hepatic panel FUTURE 2mo    Mixed hyperlipidemia  Elevated ASCVD with type 2 diabetes.  Plan to start Lipitor  - atorvastatin (LIPITOR) 40 MG tablet  Dispense: 90 tablet; Refill: 3  - Lipid panel reflex to direct LDL Fasting    Follow-up in 3 months for recheck A1c and lipid panel    BMI  Estimated body mass index is 31.26 kg/m  as calculated from the following:     "Height as of this encounter: 1.758 m (5' 9.21\").    Weight as of this encounter: 96.6 kg (213 lb).   Weight management plan: Discussed healthy diet and exercise guidelines        Tana García is a 43 year old, presenting for the following health issues:  Diabetes        6/13/2024     7:17 AM   Additional Questions   Roomed by Helena SAENZ MA   Accompanied by Self     History of Present Illness       Diabetes:   He presents for follow up of diabetes.   He is checking home blood glucose with a continuous glucose monitor.   He checks blood glucose before and after meals.  Blood glucose is sometimes over 200 and sometimes under 70.  When his blood glucose is low, the patient is asymptomatic for confusion, blurred vision, lethargy and reports not feeling dizzy, shaky, or weak.  He is concerned about other.    He is not experiencing numbness or burning in feet, excessive thirst, blurry vision, weight changes or redness, sores or blisters on feet. The patient has not had a diabetic eye exam in the last 12 months.          He eats 2-3 servings of fruits and vegetables daily.He consumes 0 sweetened beverage(s) daily.He exercises with enough effort to increase his heart rate 20 to 29 minutes per day.  He exercises with enough effort to increase his heart rate 3 or less days per week. He is missing 2 dose(s) of medications per week.  He is not taking prescribed medications regularly due to other.     Had CT scan done on 6/10/2024, follow up for that.  Had cold-like symptoms and concern for pneumonia and a chest x-ray that showed new pulmonary nodules.  CT scan showed 2 new pulmonary nodules 8 mm and 5 mm.  He is currently asymptomatic.  He has no smoking history.  They are concerned regarding possible cancer understandably so  Has been about a year since she has had A1c checked.  He does have CGM and average blood sugar readings are less than 200        Review of Systems  Constitutional, HEENT, cardiovascular, pulmonary, gi " "and gu systems are negative, except as otherwise noted.      Objective    /80 (BP Location: Right arm, Patient Position: Chair, Cuff Size: Adult Large)   Pulse 85   Temp 97.7  F (36.5  C)   Resp 16   Ht 1.758 m (5' 9.21\")   Wt 96.6 kg (213 lb)   SpO2 94%   BMI 31.26 kg/m    Body mass index is 31.26 kg/m .  Physical Exam  Constitutional:       Appearance: Normal appearance.   HENT:      Head: Normocephalic.   Eyes:      Conjunctiva/sclera: Conjunctivae normal.   Cardiovascular:      Rate and Rhythm: Normal rate and regular rhythm.      Pulses: Normal pulses.   Pulmonary:      Effort: Pulmonary effort is normal.   Abdominal:      General: Bowel sounds are normal.   Skin:     General: Skin is warm and dry.   Neurological:      Mental Status: He is alert.        Signed Electronically by: JANICE ROMAN DO    "

## 2024-06-13 NOTE — PROGRESS NOTES
New IP (Interventional Pulmonology) referral rec'd.  Chart reviewed.       New Patient: Interventional Pulmonary (Lung nodule) Nurse Navigator Note    Referring provider[facility]    Referred to (specialty): Interventional Pulmonary (Lung nodule)    Requested provider (if applicable): n/a    Date Referral Received: 6/13/2024    Evaluation for :  Lung nodule    Clinical History (per Nurse review of records provided):    **BOOK MARKED**    CT CHEST WITH CONTRAST  6/10/2024 8:15 AM     CLINICAL HISTORY:  Abnormality on recent chest x-ray with recommended  CT follow-up. Abnormal chest x-ray.     TECHNIQUE: CT chest with IV contrast. Multiplanar reformats were  obtained. Dose reduction techniques were used.     CONTRAST: 100 Isovue-[id number]     COMPARISON: 5/21/2024, 6/18/2020     FINDINGS:   LUNGS AND PLEURA: 8 mm groundglass density nodule in the superior  segment right lower lobe on image 108 of series 5 may reflect the  abnormality on chest x-ray. A 5 mm right lower lobe nodule is seen on  image 132. No other pulmonary abnormalities in this area. The left  lung is clear. No effusions.     MEDIASTINUM/AXILLAE: No axillary or mediastinal adenopathy. No  pericardial effusion.     CORONARY ARTERY CALCIFICATION: Moderate     UPPER ABDOMEN: Diffuse hepatic steatosis is noted.     MUSCULOSKELETAL: Mild degenerative changes are noted through the  spine.                                                                      IMPRESSION:   1.  8 mm groundglass density nodule in the superior segment of the  right lower lobe may reflect the radiographic abnormality. Follow-up  CT is recommended in 3 months to assess for stability or resolution.  2.  5 mm right lower lobe nodule. This can be reassessed in 3 months  as well, but if it persists and there is a history smoking,  malignancy, or other risk factors, follow-up CT would be recommended  in 1 year.  3.  Diffuse  hepatic steatosis.     JAYDEN HART MD     Records Location: [facility]     Records Needed: none    Additional testing needed prior to consult: PFT's

## 2024-06-26 ENCOUNTER — ALLIED HEALTH/NURSE VISIT (OUTPATIENT)
Dept: EDUCATION SERVICES | Facility: CLINIC | Age: 44
End: 2024-06-26
Payer: COMMERCIAL

## 2024-06-26 DIAGNOSIS — E11.9 TYPE 2 DIABETES MELLITUS WITHOUT COMPLICATION, WITHOUT LONG-TERM CURRENT USE OF INSULIN (H): Primary | ICD-10-CM

## 2024-06-26 PROCEDURE — G0108 DIAB MANAGE TRN  PER INDIV: HCPCS | Performed by: DIETITIAN, REGISTERED

## 2024-06-26 NOTE — LETTER
6/26/2024         RE: Ricky Pruitt  [id number]  [place] [id number]        Dear Colleague,    Thank you for referring your patient, Ricky Pruitt, to the [facility]. Please see a copy of my visit note below.    Diabetes Self-Management Education & Support    Presents for: Individual review    Type of Service: In Person Visit      ASSESSMENT:  -type 2 diabetes for ~4 years  -recent a1c of 8.2 (8.9, 11.6, history 13.0)  -currently being managed with Ozempic (out for 3 weeks, will restart at 1mg rather than the 2mg)  -on statin therapy  -diabetes complications:  fatty liver  -last seen by diabetes education 3/8/23 virtual with Alexandrea QUEZADA  -home BG monitoring (Dexcom G7 6/13/24-6/26/24 ) reveals:  67% in target    Primary concern: mostly dietary questions    Discussed:  diabetes as a disease, carbohydrate counting, meal plan, exercise (bikes 2-3 times/week, 75 miles/week in the summer; winter more inactive due to kids activities), accepting diabetes as a chronic disease, intermittent fasting pros and cons, use of Ozempic, care team appointments.  Pt expressed understanding.    Follow up with pcp on instructed to follow up in 3 months from visit on 6/13/24    Patient's most recent   Lab Results   Component Value Date    A1C 8.2 06/13/2024    A1C 7.6 12/31/2020     is not meeting goal of <7.0    Diabetes knowledge and skills assessment:   Patient is knowledgeable in diabetes management concepts related to: Healthy Eating, Being Active, Monitoring, and Taking Medication    Continue education with the following diabetes management concepts: Healthy Eating, Being Active, Monitoring, Taking Medication, Problem Solving, Reducing Risks, and Healthy Coping    Based on learning assessment above, most appropriate setting for further diabetes education would be: Individual setting.      PLAN    1.  Aim for 45-75 grams of carbohydrate/meal and 0-30 grams of carbohydrate/snack. 10-12 hr  "fast/day is typical   2.  Continue your activity level.  3.  Continue the Ozempic and increase to 2.0 mg when done with the 1.0 mg.  4.  Continue to wear the Dexcom.  Consider adding events (grams of carbohydrate and time/intensity of exercise).  5.  Reach out to Dexcom for sensor replacement if it doesn't stay on for the full time.  6.  Continue to follow up with Connie Sanchez as directed.  7.  Follow up with Diabetes Education 9/26/24 at 8am (in person).  If you have questions you can send them through [employer] or call Diabetes Education Triage [phone].  For Scheduling call [phone].    Had to call patient after the visit to schedule the follow up appt.      See Care Plan for co-developed, patient-state behavior change goals.  AVS provided for patient today.    Education Materials Provided:  No new materials provided today      SUBJECTIVE/OBJECTIVE:  Presents for: Individual review  Accompanied by: Self  Diabetes education in the past 24mo: Yes  Focus of Visit: Healthy Eating, Being Active  Diabetes type: Type 2  Date of diagnosis: 2020  Diabetes management related comments/concerns: alot of questions  Transportation concerns: No  Difficulty affording diabetes medication?: No  Difficulty affording diabetes testing supplies?: No  Other concerns:: None  Cultural Influences/Ethnic Background:  Not  or     Diabetes Symptoms & Complications:  Symptom course: Stable (202# at the end of summer, up to 215#)       Patient Problem List and Family Medical History reviewed for relevant medical history, current medical status, and diabetes risk factors.    Vitals:  There were no vitals taken for this visit.  Estimated body mass index is 31.26 kg/m  as calculated from the following:    Height as of 6/13/24: 1.758 m (5' 9.21\").    Weight as of 6/13/24: 96.6 kg (213 lb).   Last 3 BP:   BP Readings from Last 3 Encounters:   06/13/24 120/80   05/29/24 118/78   05/21/24 120/78       History   Smoking Status " "    Never   Smokeless Tobacco     Never       Labs:  Lab Results   Component Value Date    A1C 8.2 06/13/2024    A1C 7.6 12/31/2020     Lab Results   Component Value Date     06/13/2024     08/24/2021     12/31/2020     Lab Results   Component Value Date     06/13/2024     12/31/2020     HDL Cholesterol   Date Value Ref Range Status   12/31/2020 38 (L) >39 mg/dL Final     Direct Measure HDL   Date Value Ref Range Status   06/13/2024 51 >=40 mg/dL Final   ]  GFR Estimate   Date Value Ref Range Status   06/13/2024 >90 >60 mL/min/1.73m2 Final     Comment:     eGFR calculated using 2021 CKD-EPI equation.   12/31/2020 >90 >60 mL/min/[1.73_m2] Final     Comment:     Non  GFR Calc  Starting 12/18/2018, serum creatinine based estimated GFR (eGFR) will be   calculated using the Chronic Kidney Disease Epidemiology Collaboration   (CKD-EPI) equation.       GFR, ESTIMATED POCT   Date Value Ref Range Status   06/10/2024 >60 >60 mL/min/1.73m2 Final     GFR Estimate If Black   Date Value Ref Range Status   12/31/2020 >90 >60 mL/min/[1.73_m2] Final     Comment:      GFR Calc  Starting 12/18/2018, serum creatinine based estimated GFR (eGFR) will be   calculated using the Chronic Kidney Disease Epidemiology Collaboration   (CKD-EPI) equation.       Lab Results   Component Value Date    CR 0.81 06/13/2024    CR 0.79 12/31/2020     No results found for: \"MICROALBUMIN\"    Healthy Eating:  Healthy Eating Assessed Today: Yes  Meal planning/habits:  (use to eat rice alot, but no longer)  Meals include: Breakfast, Lunch, Dinner  Breakfast: 9:30am:  apple  Lunch: 1pm:  1 cup white rice, chicken or pork or beef or salmon, 1 cup broccoli,water  Dinner: 8pm:  similar to lunch  Snacks: has not been snacking lately, maybe some fruit in PM  Other: * cut out white rice ~4 years ago to hepatitis  Beverages: Water (64 oz water)    Being Active:  Being Active Assessed Today: " Yes  Exercise:: Yes  Days per week of moderate to strenuous exercise (like a brisk walk): 2 (up to 3 times/week)  On average, minutes per day of exercise at this level: 120 (biking, sometimes  miles at a time (goal of 3000 miles/yr))  How intense was your typical exercise? : Moderate (like brisk walking)  Exercise Minutes per Week: 240  Barrier to exercise: Other (commitments to kids and weather)    Monitoring:  Monitoring Assessed Today: Yes  Did patient bring glucose meter to appointment? : Yes  Blood Glucose Meter: CGM      6/23/24: biked 75 miles, after it ate a lot of food (burned ~4660 calories on the ride)            Taking Medications:  Diabetes Medication(s)       Incretin Mimetic Agents       Semaglutide, 1 MG/DOSE, (OZEMPIC, 1 MG/DOSE,) 4 MG/3ML pen Inject 1 mg Subcutaneous every 7 days Need appt and labs prior to additional refills     Semaglutide, 2 MG/DOSE, (OZEMPIC) 8 MG/3ML pen Inject 2 mg Subcutaneous every 7 days            Taking Medication Assessed Today: Yes  Current Treatments: Diet, Non-insulin Injectables  Problems taking diabetes medications regularly?: No  Diabetes medication side effects?: No    Problem Solving:  Problem Solving Assessed Today: No              Reducing Risks:  Reducing Risks Assessed Today: Yes  Diabetes Risks: None  CAD Risks: Diabetes Mellitus, Family history, Male sex  Has dilated eye exam at least once a year?: No  Sees dentist every 6 months?: No  Feet checked by healthcare provider in the last year?: No    Healthy Coping:  Healthy Coping Assessed Today: Yes  Emotional response to diabetes: Ready to learn  Informal Support system:: Family  Stage of change: ACTION (Actively working towards change)  Patient Activation Measure Survey Score:      4/10/2023     6:36 AM   GABO Score (Last Two)   GABO Raw Score 35   Activation Score 72.1   GABO Level 3         Care Plan and Education Provided:  Healthy Eating: Carbohydrate Counting, Being Active: intense in the summer and  not much time for it in the winter, Monitoring: Log and interpret results and Purpose, Taking Medication: Ozempic, and Problem Solving: High glucose - causes, signs/symptoms, treatment and prevention    Nel Pedroza RD, [facility], 8:56 AM, 6/26/2024    Time Spent: 60 minutes  Encounter Type: Individual    Any diabetes medication dose changes were made via the CDE Protocol per the patient's primary care provider. A copy of this encounter was shared with the provider.

## 2024-06-26 NOTE — PROGRESS NOTES
Diabetes Self-Management Education & Support    Presents for: Individual review    Type of Service: In Person Visit      ASSESSMENT:  -type 2 diabetes for ~4 years  -recent a1c of 8.2 (8.9, 11.6, history 13.0)  -currently being managed with Ozempic (out for 3 weeks, will restart at 1mg rather than the 2mg)  -on statin therapy  -diabetes complications:  fatty liver  -last seen by diabetes education 3/8/23 virtual with Alexandrea QUEZADA  -home BG monitoring (Dexcom G7 6/13/24-6/26/24 ) reveals:  67% in target    Primary concern: mostly dietary questions    Discussed:  diabetes as a disease, carbohydrate counting, meal plan, exercise (bikes 2-3 times/week, 75 miles/week in the summer; winter more inactive due to kids activities), accepting diabetes as a chronic disease, intermittent fasting pros and cons, use of Ozempic, care team appointments.  Pt expressed understanding.    Follow up with pcp on instructed to follow up in 3 months from visit on 6/13/24    Patient's most recent   Lab Results   Component Value Date    A1C 8.2 06/13/2024    A1C 7.6 12/31/2020     is not meeting goal of <7.0    Diabetes knowledge and skills assessment:   Patient is knowledgeable in diabetes management concepts related to: Healthy Eating, Being Active, Monitoring, and Taking Medication    Continue education with the following diabetes management concepts: Healthy Eating, Being Active, Monitoring, Taking Medication, Problem Solving, Reducing Risks, and Healthy Coping    Based on learning assessment above, most appropriate setting for further diabetes education would be: Individual setting.      PLAN    1.  Aim for 45-75 grams of carbohydrate/meal and 0-30 grams of carbohydrate/snack. 10-12 hr fast/day is typical   2.  Continue your activity level.  3.  Continue the Ozempic and increase to 2.0 mg when done with the 1.0 mg.  4.  Continue to wear the Dexcom.  Consider adding events (grams of carbohydrate and time/intensity of exercise).  5.  Reach  "out to Dexcom for sensor replacement if it doesn't stay on for the full time.  6.  Continue to follow up with Connie Sanchez as directed.  7.  Follow up with Diabetes Education 9/26/24 at 8am (in person).  If you have questions you can send them through [employer] or call Diabetes Education Triage [phone].  For Scheduling call [phone].    Had to call patient after the visit to schedule the follow up appt.      See Care Plan for co-developed, patient-state behavior change goals.  AVS provided for patient today.    Education Materials Provided:  No new materials provided today      SUBJECTIVE/OBJECTIVE:  Presents for: Individual review  Accompanied by: Self  Diabetes education in the past 24mo: Yes  Focus of Visit: Healthy Eating, Being Active  Diabetes type: Type 2  Date of diagnosis: 2020  Diabetes management related comments/concerns: alot of questions  Transportation concerns: No  Difficulty affording diabetes medication?: No  Difficulty affording diabetes testing supplies?: No  Other concerns:: None  Cultural Influences/Ethnic Background:  Not  or     Diabetes Symptoms & Complications:  Symptom course: Stable (202# at the end of summer, up to 215#)       Patient Problem List and Family Medical History reviewed for relevant medical history, current medical status, and diabetes risk factors.    Vitals:  There were no vitals taken for this visit.  Estimated body mass index is 31.26 kg/m  as calculated from the following:    Height as of 6/13/24: 1.758 m (5' 9.21\").    Weight as of 6/13/24: 96.6 kg (213 lb).   Last 3 BP:   BP Readings from Last 3 Encounters:   06/13/24 120/80   05/29/24 118/78   05/21/24 120/78       History   Smoking Status    Never   Smokeless Tobacco    Never       Labs:  Lab Results   Component Value Date    A1C 8.2 06/13/2024    A1C 7.6 12/31/2020     Lab Results   Component Value Date     06/13/2024     08/24/2021     12/31/2020     Lab Results " "  Component Value Date     06/13/2024     12/31/2020     HDL Cholesterol   Date Value Ref Range Status   12/31/2020 38 (L) >39 mg/dL Final     Direct Measure HDL   Date Value Ref Range Status   06/13/2024 51 >=40 mg/dL Final   ]  GFR Estimate   Date Value Ref Range Status   06/13/2024 >90 >60 mL/min/1.73m2 Final     Comment:     eGFR calculated using 2021 CKD-EPI equation.   12/31/2020 >90 >60 mL/min/[1.73_m2] Final     Comment:     Non  GFR Calc  Starting 12/18/2018, serum creatinine based estimated GFR (eGFR) will be   calculated using the Chronic Kidney Disease Epidemiology Collaboration   (CKD-EPI) equation.       GFR, ESTIMATED POCT   Date Value Ref Range Status   06/10/2024 >60 >60 mL/min/1.73m2 Final     GFR Estimate If Black   Date Value Ref Range Status   12/31/2020 >90 >60 mL/min/[1.73_m2] Final     Comment:      GFR Calc  Starting 12/18/2018, serum creatinine based estimated GFR (eGFR) will be   calculated using the Chronic Kidney Disease Epidemiology Collaboration   (CKD-EPI) equation.       Lab Results   Component Value Date    CR 0.81 06/13/2024    CR 0.79 12/31/2020     No results found for: \"MICROALBUMIN\"    Healthy Eating:  Healthy Eating Assessed Today: Yes  Meal planning/habits:  (use to eat rice alot, but no longer)  Meals include: Breakfast, Lunch, Dinner  Breakfast: 9:30am:  apple  Lunch: 1pm:  1 cup white rice, chicken or pork or beef or salmon, 1 cup broccoli,water  Dinner: 8pm:  similar to lunch  Snacks: has not been snacking lately, maybe some fruit in PM  Other: * cut out white rice ~4 years ago to hepatitis  Beverages: Water (64 oz water)    Being Active:  Being Active Assessed Today: Yes  Exercise:: Yes  Days per week of moderate to strenuous exercise (like a brisk walk): 2 (up to 3 times/week)  On average, minutes per day of exercise at this level: 120 (biking, sometimes  miles at a time (goal of 3000 miles/yr))  How intense was your " typical exercise? : Moderate (like brisk walking)  Exercise Minutes per Week: 240  Barrier to exercise: Other (commitments to kids and weather)    Monitoring:  Monitoring Assessed Today: Yes  Did patient bring glucose meter to appointment? : Yes  Blood Glucose Meter: CGM      6/23/24: biked 75 miles, after it ate a lot of food (burned ~4660 calories on the ride)            Taking Medications:  Diabetes Medication(s)       Incretin Mimetic Agents       Semaglutide, 1 MG/DOSE, (OZEMPIC, 1 MG/DOSE,) 4 MG/3ML pen Inject 1 mg Subcutaneous every 7 days Need appt and labs prior to additional refills     Semaglutide, 2 MG/DOSE, (OZEMPIC) 8 MG/3ML pen Inject 2 mg Subcutaneous every 7 days            Taking Medication Assessed Today: Yes  Current Treatments: Diet, Non-insulin Injectables  Problems taking diabetes medications regularly?: No  Diabetes medication side effects?: No    Problem Solving:  Problem Solving Assessed Today: No              Reducing Risks:  Reducing Risks Assessed Today: Yes  Diabetes Risks: None  CAD Risks: Diabetes Mellitus, Family history, Male sex  Has dilated eye exam at least once a year?: No  Sees dentist every 6 months?: No  Feet checked by healthcare provider in the last year?: No    Healthy Coping:  Healthy Coping Assessed Today: Yes  Emotional response to diabetes: Ready to learn  Informal Support system:: Family  Stage of change: ACTION (Actively working towards change)  Patient Activation Measure Survey Score:      4/10/2023     6:36 AM   GABO Score (Last Two)   GABO Raw Score 35   Activation Score 72.1   GABO Level 3         Care Plan and Education Provided:  Healthy Eating: Carbohydrate Counting, Being Active: intense in the summer and not much time for it in the winter, Monitoring: Log and interpret results and Purpose, Taking Medication: Ozempic, and Problem Solving: High glucose - causes, signs/symptoms, treatment and prevention    Nel Pedroza RD, [facility], 8:56 AM, 6/26/2024    Time  Spent: 60 minutes  Encounter Type: Individual    Any diabetes medication dose changes were made via the CDE Protocol per the patient's primary care provider. A copy of this encounter was shared with the provider.

## 2024-06-26 NOTE — PATIENT INSTRUCTIONS
PLAN    1.  Aim for 45-75 grams of carbohydrate/meal and 0-30 grams of carbohydrate/snack. 10-12 hr fast/day is typical   2.  Continue your activity level.  3.  Continue the Ozempic and increase to 2.0 mg when done with the 1.0 mg.  4.  Continue to wear the Dexcom.  Consider adding events (grams of carbohydrate and time/intensity of exercise).  5.  Reach out to Dexcom for sensor replacement if it doesn't stay on for the full time.  6.  Continue to follow up with Connie Sanchez as directed.  7.  Follow up with Diabetes Education 9/26/24 at 8am (in person).  If you have questions you can send them through [employer] or call Diabetes Education Triage [phone].  For Scheduling call [phone].    Nel Pedroza RD, [facility], 2:28 PM, 6/26/2024

## 2024-07-17 PROBLEM — K76.0 FATTY LIVER: Status: ACTIVE | Noted: 2024-07-17

## 2024-07-17 PROBLEM — B19.10 HEPATITIS B VIRUS INFECTION: Status: ACTIVE | Noted: 2024-07-17

## 2024-07-17 PROBLEM — E78.5 HYPERLIPIDEMIA: Status: ACTIVE | Noted: 2024-07-17

## 2024-07-17 NOTE — PROGRESS NOTES
Pre-visit planning and chart review completed.     NEW patient appointment:    7/24 with Dr. Alvarez  6/10 CT chest w contrast  7/18 PFTs    - No anticoagulation.  - Non smoker.    CE updated. Medications, allergies, problem list, and immunizations reconciled.

## 2024-07-18 ENCOUNTER — HOSPITAL ENCOUNTER (OUTPATIENT)
Dept: RESPIRATORY THERAPY | Facility: CLINIC | Age: 44
Discharge: HOME | End: 2024-07-18
Attending: INTERNAL MEDICINE | Admitting: INTERNAL MEDICINE
Payer: COMMERCIAL

## 2024-07-18 DIAGNOSIS — R91.8 PULMONARY NODULES: ICD-10-CM

## 2024-07-18 PROCEDURE — 94729 DIFFUSING CAPACITY: CPT

## 2024-07-18 PROCEDURE — 94375 RESPIRATORY FLOW VOLUME LOOP: CPT

## 2024-07-18 PROCEDURE — 94726 PLETHYSMOGRAPHY LUNG VOLUMES: CPT

## 2024-07-19 LAB
DLCOUNC-%PRED-PRE: 91 %
DLCOUNC-PRE: 27.3 ML/MIN/MMHG
DLCOUNC-PRED: 29.76 ML/MIN/MMHG
ERV-%PRED-PRE: 23 %
ERV-PRE: 0.39 L
ERV-PRED: 1.67 L
EXPTIME-PRE: 4.94 SEC
FEF2575-%PRED-PRE: 103 %
FEF2575-PRE: 3.82 L/SEC
FEF2575-PRED: 3.67 L/SEC
FEFMAX-%PRED-PRE: 67 %
FEFMAX-PRE: 6.69 L/SEC
FEFMAX-PRED: 9.88 L/SEC
FEV1-%PRED-PRE: 88 %
FEV1-PRE: 3.31 L
FEV1FEV6-PRE: 84 %
FEV1FEV6-PRED: 81 %
FEV1FVC-PRE: 84 %
FEV1FVC-PRED: 81 %
FEV1SVC-PRE: 87 %
FEV1SVC-PRED: 77 %
FIFMAX-PRE: 2.01 L/SEC
FRCPLETH-%PRED-PRE: 66 %
FRCPLETH-PRE: 2.26 L
FRCPLETH-PRED: 3.4 L
FVC-%PRED-PRE: 85 %
FVC-PRE: 3.95 L
FVC-PRED: 4.61 L
IC-%PRED-PRE: 101 %
IC-PRE: 3.4 L
IC-PRED: 3.34 L
RVPLETH-%PRED-PRE: 93 %
RVPLETH-PRE: 1.88 L
RVPLETH-PRED: 2.01 L
TLCPLETH-%PRED-PRE: 81 %
TLCPLETH-PRE: 5.66 L
TLCPLETH-PRED: 6.92 L
VA-%PRED-PRE: 82 %
VA-PRE: 5.32 L
VC-%PRED-PRE: 77 %
VC-PRE: 3.79 L
VC-PRED: 4.86 L

## 2024-07-23 NOTE — TELEPHONE ENCOUNTER
RECORDS STATUS - ALL OTHER DIAGNOSIS      RECORDS RECEIVED FROM: [facility]   NOTES STATUS DETAILS   OFFICE NOTE from referring provider Epic 6/13/24: Dr. Connie Sanchez   DISCHARGE REPORT from the ER [facility] 5/21/24: [facility]   OPERATIVE REPORT Epic PFT: 7/18/24   MEDICATION LIST [facility]    LABS     ANYTHING RELATED TO DIAGNOSIS Epic Most recent 6/13/24   IMAGING (NEED IMAGES & REPORT)     CT SCANS PACS 6/10/24: CT Chest   XRAYS PACS 5/21/24: XR Chest

## 2024-07-24 ENCOUNTER — ONCOLOGY VISIT (OUTPATIENT)
Dept: PULMONOLOGY | Facility: CLINIC | Age: 44
End: 2024-07-24
Attending: FAMILY MEDICINE
Payer: COMMERCIAL

## 2024-07-24 ENCOUNTER — PRE VISIT (OUTPATIENT)
Dept: PULMONOLOGY | Facility: CLINIC | Age: 44
End: 2024-07-24
Payer: COMMERCIAL

## 2024-07-24 VITALS
WEIGHT: 209.3 LBS | OXYGEN SATURATION: 98 % | HEIGHT: 69 IN | DIASTOLIC BLOOD PRESSURE: 82 MMHG | SYSTOLIC BLOOD PRESSURE: 119 MMHG | TEMPERATURE: 98.1 F | BODY MASS INDEX: 31 KG/M2 | HEART RATE: 65 BPM | RESPIRATION RATE: 12 BRPM

## 2024-07-24 DIAGNOSIS — R91.8 PULMONARY NODULES: ICD-10-CM

## 2024-07-24 PROCEDURE — 99213 OFFICE O/P EST LOW 20 MIN: CPT | Performed by: INTERNAL MEDICINE

## 2024-07-24 PROCEDURE — 99204 OFFICE O/P NEW MOD 45 MIN: CPT | Performed by: INTERNAL MEDICINE

## 2024-07-24 ASSESSMENT — PAIN SCALES - GENERAL: PAINLEVEL: MILD PAIN (2)

## 2024-07-24 NOTE — NURSING NOTE
"Oncology Rooming Note    July 24, 2024 8:13 AM   Ricky Pruitt is a 43 year old male who presents for:    Chief Complaint   Patient presents with    Oncology Clinic Visit     Pulmonary nodules     Initial Vitals: /82 (BP Location: Right arm, Patient Position: Sitting, Cuff Size: Adult Large)   Pulse 65   Temp 98.1  F (36.7  C) (Oral)   Resp 12   Ht 1.753 m (5' 9.02\")   Wt 94.9 kg (209 lb 4.8 oz)   SpO2 98%   BMI 30.89 kg/m   Estimated body mass index is 30.89 kg/m  as calculated from the following:    Height as of this encounter: 1.753 m (5' 9.02\").    Weight as of this encounter: 94.9 kg (209 lb 4.8 oz). Body surface area is 2.15 meters squared.  Mild Pain (2) Comment: standing more like 9   No LMP for male patient.  Allergies reviewed: Yes  Medications reviewed: Yes    Medications: Medication refills not needed today.  Pharmacy name entered into [employer]:    [facility] [place], [place] - [address] [facility] [place] [address]  [facility] [place] - [address] [id number] [place], [place] - [address]    Frailty Screening:   Is the patient here for a new oncology consult visit in cancer care? 1. Yes. Over the past month, have you experienced difficulty or required a caregiver to assist with:   1. Balance, walking or general mobility (including any falls)? NO  2. Completion of self-care tasks such as bathing, dressing, toileting, grooming/hygiene?  NO  3. Concentration or memory that affects your daily life?  NO       Clinical concerns: CT scan showed two pulmonary nodules    Cayla Luciano              "

## 2024-07-24 NOTE — PROGRESS NOTES
LUNG NODULE & INTERVENTIONAL PULMONARY CLINIC  CLINICS & SURGERY CENTER, [facility], [facility] CANCER [address] [address]  Phone: [phone]  Fax: [phone]    Patient:  Ricky Pruitt, Age 43 year old, Date of birth 1980, MRN# [id number]  Date of Visit:  07/24/2024  Referring Provider Connie Sanchez    Reason for Consultation: Lung Nodule    Assessment and Plan:    1. New multiple pulmonary lung nodule(s) and ground glass nodule. Given the characteristics on current/previous imaging and risk factors; I would classify this to be Intermediate (6-65%) risk for cancer. Had a nodule reported on CXR which led to the CT, however that cxr finding does not correlate with what's on the the CT; therefore, these nodules are incidental findings. The 8mm ggo is likely a conglomerate of blood vessels seen on the contrast study. He does have bilateral sub5mm nodules in addition. He had COVID twice most recently in Feb and these changes could be c/w that. His Sx have resolved which is reassuring. Plan repeat CT in September using high res protocol.      Billing: I spent a total of 45min spent on date of encounter which includes prep time, visit with the patient and post visit work including documentation and nursing communication     David Alvarez MD, MHA  Associate Professor of Medicine  Section of Interventional Pulmonology   Division of Pulmonary, Allergy, Critical Care and Sleep Medicine   [facility]  Pager: [phone]   Office: [phone]  Email: [email or web address]    Arely Rose RN   Interventional Pulmonary Care Coordinator   Office: [phone]  Email: [email or web address]     Luis Dueñas  Interventional Pulmonary Surgery Scheduler   Office: [phone]  Email: [email or web address]         History:     Ricky Pruitt is a 43 year old male with sig h/o for  "HBV, hyperlipidemia who is here for evaluation/followup of Lung Nodule.    Vital signs:  /82 (BP Location: Right arm, Patient Position: Sitting, Cuff Size: Adult Large)   Pulse 65   Temp 98.1  F (36.7  C) (Oral)   Resp 12   Ht 1.753 m (5' 9.02\")   Wt 94.9 kg (209 lb 4.8 oz)   SpO2 98%   BMI 30.89 kg/m      - No new resp sx or complaints. Denies dyspnea or cough.   -Child was sick and he had ongoing chronic. Had CT chest for evaluation and found nodules.   - Personal hx of cancer: no  - Family hx of cancer: no lung cancer   - Exposure hx: Denies asbestos or radon exposure   - Tobacco hx: Never Smoker.   - My interpretation of the images relevant for this visit includes: nodules   - My interpretation of the PFT's relevant for this visit includes: Normal     Culprit Nodule(s):   1: Right lower lobe nodule and is 8 mm in size/severity and ground glass in morphology/quality. First seen by chest CT on 6/12/24. There is no interval change.  2: Right lower lobe nodule and is 5 mm in size/severity and non-calcified in morphology/quality. First seen by chest CT on 6/12/24. There is no interval change.  3. Multiple bilateral lung nodule(s) that are sub 5 mm. First seen by chest CT on 6/12/24. There is no interval change    Other active medical problems include:   - NA   - has HBV on antivirals           Past Medical History:      Past Medical History:   Diagnosis Date    Diagnostic skin and sensitization tests (aka ALLERGENS) 5/13/15 skin tests pos. for:  cat/dog/DM/M/T/G/W    Hepatitis B     Seasonal allergic rhinitis     5/13/15 skin tests pos. for:  cat/dog/DM/M/T/G/W    Type 2 diabetes mellitus without complication, without long-term current use of insulin (H)            Past Surgical History:      Past Surgical History:   Procedure Laterality Date    APPENDECTOMY  2009    COLONOSCOPY N/A 7/26/2023    Procedure: Colonoscopy;  Surgeon: Dexter Garcia DO;  Location: [facility] APPENDECTOMY      " Description: Appendectomy;  Proc Date: 07/01/2009;          Social History:     Social History     Tobacco Use    Smoking status: Never     Passive exposure: Never    Smokeless tobacco: Never   Substance Use Topics    Alcohol use: No          Family History:     Family History   Problem Relation Age of Onset    Liver Disease Mother         Hepatitis B    Eye Disorder Father     Liver Disease Sister         Hepatitis B    Eye Disorder Sister            Allergies:    No Known Allergies       Medications:     Current Outpatient Medications   Medication Sig Dispense Refill    atorvastatin (LIPITOR) 40 MG tablet Take 1 tablet (40 mg) by mouth daily 90 tablet 3    Continuous Blood Gluc Sensor (DEXCOM G7 SENSOR) MISC Change every 10 days. 3 each 5    Semaglutide, 1 MG/DOSE, (OZEMPIC, 1 MG/DOSE,) 4 MG/3ML pen Inject 1 mg Subcutaneous every 7 days Need appt and labs prior to additional refills 3 mL 0    Semaglutide, 2 MG/DOSE, (OZEMPIC) 8 MG/3ML pen Inject 2 mg Subcutaneous every 7 days 9 mL 1    tenofovir (VIREAD) 300 MG tablet Take 1 tablet (300 mg) by mouth daily 30 tablet 11     No current facility-administered medications for this visit.            Review of Systems:     12-point ROS reviewed and abnormalities stated in the history.         Physical Exam:     Constitutional - looks well, in no apparent distress  Respiratory -breathing appears comfortable.   Skin - No appreciable discoloration or lesions (very limited exam)  Neurological - No apparent tremors. Speech fluent and articlate          Current Laboratory Data:   All laboratory and imaging data reviewed.    No results found for this or any previous visit (from the past 24 hour(s)).      Latest Ref Rng & Units 7/18/2024    10:32 AM   PFT   FVC L 3.95    FEV1 L 3.31    FVC% % 85    FEV1% % 88            [facility] CT scan Radiation Dose  Low dose Chest CT (DLP 50) = 0.6 mSV  Full Chest CT (DLP=50) = 1.2 mSV   Minimum effective dose to have risk for  radiation induced cancer =  mSV

## 2024-08-04 ENCOUNTER — TRANSFERRED RECORDS (OUTPATIENT)
Dept: HEALTH INFORMATION MANAGEMENT | Facility: CLINIC | Age: 44
End: 2024-08-04
Payer: COMMERCIAL

## 2024-08-04 LAB — RETINOPATHY: NEGATIVE

## 2024-08-12 ENCOUNTER — MYC REFILL (OUTPATIENT)
Dept: FAMILY MEDICINE | Facility: CLINIC | Age: 44
End: 2024-08-12
Payer: COMMERCIAL

## 2024-08-12 DIAGNOSIS — E11.9 TYPE 2 DIABETES MELLITUS WITHOUT COMPLICATION, WITHOUT LONG-TERM CURRENT USE OF INSULIN (H): ICD-10-CM

## 2024-08-12 DIAGNOSIS — K76.0 FATTY LIVER: ICD-10-CM

## 2024-08-14 ENCOUNTER — PATIENT OUTREACH (OUTPATIENT)
Dept: CARE COORDINATION | Facility: CLINIC | Age: 44
End: 2024-08-14
Payer: COMMERCIAL

## 2024-09-11 ENCOUNTER — ANCILLARY PROCEDURE (OUTPATIENT)
Dept: CT IMAGING | Facility: CLINIC | Age: 44
End: 2024-09-11
Attending: INTERNAL MEDICINE
Payer: COMMERCIAL

## 2024-09-11 ENCOUNTER — ONCOLOGY VISIT (OUTPATIENT)
Dept: PULMONOLOGY | Facility: CLINIC | Age: 44
End: 2024-09-11
Attending: INTERNAL MEDICINE
Payer: COMMERCIAL

## 2024-09-11 VITALS
OXYGEN SATURATION: 96 % | TEMPERATURE: 97.9 F | WEIGHT: 211.8 LBS | DIASTOLIC BLOOD PRESSURE: 80 MMHG | BODY MASS INDEX: 31.37 KG/M2 | HEIGHT: 69 IN | HEART RATE: 72 BPM | SYSTOLIC BLOOD PRESSURE: 113 MMHG

## 2024-09-11 DIAGNOSIS — R91.8 PULMONARY NODULES: Primary | ICD-10-CM

## 2024-09-11 DIAGNOSIS — R91.8 PULMONARY NODULES: ICD-10-CM

## 2024-09-11 PROCEDURE — 99214 OFFICE O/P EST MOD 30 MIN: CPT | Performed by: INTERNAL MEDICINE

## 2024-09-11 PROCEDURE — 99213 OFFICE O/P EST LOW 20 MIN: CPT | Performed by: INTERNAL MEDICINE

## 2024-09-11 PROCEDURE — 71250 CT THORAX DX C-: CPT | Performed by: RADIOLOGY

## 2024-09-11 ASSESSMENT — PAIN SCALES - GENERAL: PAINLEVEL: MODERATE PAIN (4)

## 2024-09-11 NOTE — PROGRESS NOTES
"    LUNG NODULE & INTERVENTIONAL PULMONARY CLINIC  CLINICS & SURGERY CENTER, [facility], [facility] CANCER [facility]  [address] [facility] [address]  Phone: [phone]  Fax: [phone]    Patient:  Ricky Pruitt, Age 43 year old, Date of birth 1980, MRN# [id number]  Date of Visit:  09/11/2024  Referring Provider Connie Sanchez    Reason for Consultation: Lung Nodule    Assessment and Plan:    1. Establishied multiple pulmonary lung nodule(s) and ground glass nodule. Resolved on todays CT. Likely inflammatory in nature. No further surveillance indicated     Billing: I spent a total of 45min spent on date of encounter which includes prep time, visit with the patient and post visit work including documentation and nursing communication     David Alvarez MD, MHA  Associate Professor of Medicine  Section of Interventional Pulmonology   Division of Pulmonary, Allergy, Critical Care and Sleep Medicine   [facility]  Pager: [phone]   Office: [phone]  Email: [email or web address]    Arely Rose RN   Interventional Pulmonary Care Coordinator   Office: [phone]  Email: [email or web address]     Luis Dueñas  Interventional Pulmonary Surgery Scheduler   Office: [phone]  Email: [email or web address]         History:     Ricky Pruitt is a 43 year old male with sig h/o for HBV, hyperlipidemia who is here for evaluation/followup of Lung Nodule.    Vital signs:  /80 (BP Location: Right arm, Patient Position: Right side, Cuff Size: Adult Large)   Pulse 72   Temp 97.9  F (36.6  C) (Oral)   Ht 1.755 m (5' 9.09\")   Wt 96.1 kg (211 lb 12.8 oz)   SpO2 96%   BMI 31.19 kg/m      - No new resp sx or complaints. Denies dyspnea or cough.   -Child was sick and he had ongoing chronic. Had CT chest for evaluation and found nodules.   - Personal hx of cancer: no  - Family hx of cancer: " no lung cancer   - Exposure hx: Denies asbestos or radon exposure   - Tobacco hx: Never Smoker.   - My interpretation of the images relevant for this visit includes: nodules   - My interpretation of the PFT's relevant for this visit includes: Normal     Culprit Nodule(s):   1: Right lower lobe nodule and is 8 mm in size/severity and ground glass in morphology/quality. First seen by chest CT on 6/12/24. There is no interval change.  2: Right lower lobe nodule and is 5 mm in size/severity and non-calcified in morphology/quality. First seen by chest CT on 6/12/24. There is no interval change.  3. Multiple bilateral lung nodule(s) that are sub 5 mm. First seen by chest CT on 6/12/24. There is no interval change    Other active medical problems include:   - NA   - has HBV on antivirals           Past Medical History:      Past Medical History:   Diagnosis Date    Diagnostic skin and sensitization tests (aka ALLERGENS) 5/13/15 skin tests pos. for:  cat/dog/DM/M/T/G/W    Hepatitis B     Seasonal allergic rhinitis     5/13/15 skin tests pos. for:  cat/dog/DM/M/T/G/W    Type 2 diabetes mellitus without complication, without long-term current use of insulin (H)            Past Surgical History:      Past Surgical History:   Procedure Laterality Date    APPENDECTOMY  2009    COLONOSCOPY N/A 7/26/2023    Procedure: Colonoscopy;  Surgeon: Dexter Garcia DO;  Location: [facility] APPENDECTOMY      Description: Appendectomy;  Proc Date: 07/01/2009;          Social History:     Social History     Tobacco Use    Smoking status: Never     Passive exposure: Never    Smokeless tobacco: Never   Substance Use Topics    Alcohol use: No          Family History:     Family History   Problem Relation Age of Onset    Liver Disease Mother         Hepatitis B    Eye Disorder Father     Liver Disease Sister         Hepatitis B    Eye Disorder Sister            Allergies:    No Known Allergies       Medications:     Current Outpatient  Medications   Medication Sig Dispense Refill    atorvastatin (LIPITOR) 40 MG tablet Take 1 tablet (40 mg) by mouth daily 90 tablet 3    Continuous Blood Gluc Sensor (DEXCOM G7 SENSOR) MISC Change every 10 days. 3 each 5    Semaglutide, 2 MG/DOSE, (OZEMPIC) 8 MG/3ML pen Inject 2 mg subcutaneously every 7 days 9 mL 0    tenofovir (VIREAD) 300 MG tablet Take 1 tablet (300 mg) by mouth daily 30 tablet 11    Semaglutide, 1 MG/DOSE, (OZEMPIC, 1 MG/DOSE,) 4 MG/3ML pen Inject 1 mg Subcutaneous every 7 days Need appt and labs prior to additional refills (Patient not taking: Reported on 9/11/2024) 3 mL 0     No current facility-administered medications for this visit.            Review of Systems:     12-point ROS reviewed and abnormalities stated in the history.         Physical Exam:     Constitutional - looks well, in no apparent distress  Respiratory -breathing appears comfortable.   Skin - No appreciable discoloration or lesions (very limited exam)  Neurological - No apparent tremors. Speech fluent and articlate          Current Laboratory Data:   All laboratory and imaging data reviewed.    Results for orders placed or performed in visit on 09/11/24 (from the past 24 hour(s))   CT Chest w/o Contrast    Narrative    CT chest without contrast     INDICATION: Nodules, cough    COMPARISON: 6/10/2024    FINDINGS: No contrast. Included thyroid appears unremarkable. No  enlarged lymph nodes. Heart size normal. Thoracic aortic caliber  normal. Pulmonary artery caliber normal. Atherosclerotic calcification  in the coronary arteries. No pleural or pericardial effusion. The  included upper abdomen appears unremarkable. There is an accessory  splenule.  Bone detail shows degenerative changes in the mid to lower thoracic  spine.    Detail of the lungs shows minimal subsegmental atelectasis/scarring in  the medial left lower lobe. No suspicious nodule or advanced  interstitial lung disease. Previous nodules in the right lower  lobe  have resolved.  Expiratory imaging does show mosaicism in the lower lobes consistent  with air trapping.  Prone imaging obtained with no suspicious atelectasis or fibrosis to  differentiate. No cystic lung disease or bronchiectasis especially  noted on the minimum intensity projection images.      Impression    IMPRESSION: No discrete pulmonary nodule. The previous right lower  lobe superior segment groundglass opacity as resolved. Previous 5 mm  right lower lobe nodule has also resolved. Mild air trapping in the  lower lobes. Atherosclerosis including coronary artery disease.    MAYRA MCKINLEY MD         SYSTEM ID:  [id number]         Latest Ref Rng & Units 7/18/2024    10:32 AM   PFT   FVC L 3.95    FEV1 L 3.31    FVC% % 85    FEV1% % 88            [facility] CT scan Radiation Dose  Low dose Chest CT (DLP 50) = 0.6 mSV  Full Chest CT (DLP=50) = 1.2 mSV   Minimum effective dose to have risk for radiation induced cancer =  mSV

## 2024-09-11 NOTE — PROGRESS NOTES
Pre-visit planning and chart review completed.     RETURN patient appointment:    9/11 with Dr. Alvarez  9/11 HRCT  7/18 PFTs    - 2 month follow-up.  - No anticoagulation.  - Non smoker.    CE updated. Medications, allergies, problem list, and immunizations reconciled.

## 2024-09-11 NOTE — NURSING NOTE
"Oncology Rooming Note    September 11, 2024 8:43 AM   Ricky Pruitt is a 43 year old male who presents for:    Chief Complaint   Patient presents with    Oncology Clinic Visit     RTN CCSL Lung Nodule     Initial Vitals: /80 (BP Location: Right arm, Patient Position: Right side, Cuff Size: Adult Large)   Pulse 72   Temp 97.9  F (36.6  C) (Oral)   Ht 1.755 m (5' 9.09\")   Wt 96.1 kg (211 lb 12.8 oz)   SpO2 96%   BMI 31.19 kg/m   Estimated body mass index is 31.19 kg/m  as calculated from the following:    Height as of this encounter: 1.755 m (5' 9.09\").    Weight as of this encounter: 96.1 kg (211 lb 12.8 oz). Body surface area is 2.16 meters squared.  Moderate Pain (4) Comment: Data Unavailable   No LMP for male patient.  Allergies reviewed: Yes  Medications reviewed: Yes    Medications: Medication refills not needed today.  Pharmacy name entered into [employer]:    [facility] [place], [place] - [address] [facility]  [facility] [place] [address]  [facility] [place] [facility] - [address] [id number] - [place], [place] - [address]    Frailty Screening:   Is the patient here for a new oncology consult visit in cancer care? 2. No      Clinical concerns: none      Te Yanez             "

## 2024-09-16 ENCOUNTER — MYC MEDICAL ADVICE (OUTPATIENT)
Dept: FAMILY MEDICINE | Facility: CLINIC | Age: 44
End: 2024-09-16
Payer: COMMERCIAL

## 2024-09-16 NOTE — TELEPHONE ENCOUNTER
Patient Quality Outreach    Patient is due for the following:   Diabetes -  Diabetic Follow-Up Visit    Next Steps:   Schedule a office visit for diabetes    Type of outreach:    Sent [employer] message.      Questions for provider review:    None           Helena Hankins, CMA

## 2024-11-03 ENCOUNTER — HEALTH MAINTENANCE LETTER (OUTPATIENT)
Age: 44
End: 2024-11-03

## 2025-01-27 ENCOUNTER — MYC MEDICAL ADVICE (OUTPATIENT)
Dept: FAMILY MEDICINE | Facility: CLINIC | Age: 45
End: 2025-01-27
Payer: COMMERCIAL

## 2025-01-27 NOTE — TELEPHONE ENCOUNTER
Patient Quality Outreach    Patient is due for the following:   Diabetes -  Diabetic Follow-Up Visit    Action(s) Taken:   Schedule a office visit for diabetes    Type of outreach:    Sent [employer] message.    Questions for provider review:    None           Helena Hankins, CMA

## 2025-03-01 ENCOUNTER — HEALTH MAINTENANCE LETTER (OUTPATIENT)
Age: 45
End: 2025-03-01

## 2025-03-03 NOTE — PATIENT INSTRUCTIONS
"Dear Ricky Pruitt    After reviewing your responses, I've been able to diagnose you with \"Bronchitis\" which is a common infection of your lungs that is nearly always caused by a virus. The virus causes swelling and irritation of the air passages of your lungs which leads to cough. The illness spreads from your nose and throat to your windpipe and airways. It is often called a \"chest cold\" and can last up to 2 weeks, but is not a serious illness. Exposure to cigarette smoke usually makes this significantly worse.      To treat bronchitis, the main thing to do is drink lots of fluids and rest. Cough medications over-the-counter such as mucinex, robitussin or \"cold and sinus\" medications can be helpful. Ibuprofen and Tylenol also help with fevers or aching feelings that you often have with this kind of illness. Do not take ibuprofen if you have kidney disease, stomach ulcers or allergy to aspirin.     Bronchitis is most often highly contagious as viruses are spread through the air or touch. Avoid contact with others who may become infected, particularly children, the elderly and those whose immune systems might be weak.     If your symptoms worsen, you develop chest pain or shortness of breath, fevers over 101, or are not improving in 5 days, please contact your primary care provider for an appointment or visit any of our convenient Walk-in Care or Urgent Care Centers to be seen which can be found on our website here.    Thanks again for choosing us as your health care partner,    JANICE ROMAN, DO  "
VTE Present on Admission, Assessment Completed on: 03-Mar-2025 13:50

## 2025-04-19 ENCOUNTER — HEALTH MAINTENANCE LETTER (OUTPATIENT)
Age: 45
End: 2025-04-19

## 2025-06-21 ENCOUNTER — HEALTH MAINTENANCE LETTER (OUTPATIENT)
Age: 45
End: 2025-06-21